# Patient Record
Sex: FEMALE | Race: WHITE | NOT HISPANIC OR LATINO
[De-identification: names, ages, dates, MRNs, and addresses within clinical notes are randomized per-mention and may not be internally consistent; named-entity substitution may affect disease eponyms.]

---

## 2017-01-09 ENCOUNTER — APPOINTMENT (OUTPATIENT)
Dept: PULMONOLOGY | Facility: CLINIC | Age: 82
End: 2017-01-09

## 2017-01-19 ENCOUNTER — APPOINTMENT (OUTPATIENT)
Dept: PULMONOLOGY | Facility: CLINIC | Age: 82
End: 2017-01-19

## 2017-01-19 VITALS
HEIGHT: 63 IN | SYSTOLIC BLOOD PRESSURE: 141 MMHG | OXYGEN SATURATION: 98 % | DIASTOLIC BLOOD PRESSURE: 77 MMHG | BODY MASS INDEX: 26.4 KG/M2 | TEMPERATURE: 98.2 F | WEIGHT: 149 LBS | RESPIRATION RATE: 12 BRPM | HEART RATE: 61 BPM

## 2017-01-23 LAB
ALBUMIN SERPL ELPH-MCNC: 4.4 G/DL
ALP BLD-CCNC: 98 U/L
ALT SERPL-CCNC: 15 U/L
ANION GAP SERPL CALC-SCNC: 17 MMOL/L
AST SERPL-CCNC: 26 U/L
BASOPHILS # BLD AUTO: 0.06 K/UL
BASOPHILS NFR BLD AUTO: 1 %
BILIRUB SERPL-MCNC: 0.5 MG/DL
BUN SERPL-MCNC: 28 MG/DL
CALCIUM SERPL-MCNC: 9.5 MG/DL
CHLORIDE SERPL-SCNC: 98 MMOL/L
CHOLEST SERPL-MCNC: 190 MG/DL
CHOLEST/HDLC SERPL: 2.3 RATIO
CO2 SERPL-SCNC: 24 MMOL/L
CREAT SERPL-MCNC: 1.23 MG/DL
EOSINOPHIL # BLD AUTO: 0.21 K/UL
EOSINOPHIL NFR BLD AUTO: 3.5 %
GLUCOSE SERPL-MCNC: 94 MG/DL
HBA1C MFR BLD HPLC: 5.8 %
HCT VFR BLD CALC: 41.2 %
HDLC SERPL-MCNC: 83 MG/DL
HGB BLD-MCNC: 13.5 G/DL
IMM GRANULOCYTES NFR BLD AUTO: 0.2 %
LDLC SERPL CALC-MCNC: 79 MG/DL
LYMPHOCYTES # BLD AUTO: 1.76 K/UL
LYMPHOCYTES NFR BLD AUTO: 29.1 %
MAN DIFF?: NORMAL
MCHC RBC-ENTMCNC: 29.5 PG
MCHC RBC-ENTMCNC: 32.8 GM/DL
MCV RBC AUTO: 90.2 FL
MONOCYTES # BLD AUTO: 0.56 K/UL
MONOCYTES NFR BLD AUTO: 9.3 %
NEUTROPHILS # BLD AUTO: 3.44 K/UL
NEUTROPHILS NFR BLD AUTO: 56.9 %
PLATELET # BLD AUTO: 261 K/UL
POTASSIUM SERPL-SCNC: 3.9 MMOL/L
PROT SERPL-MCNC: 6.7 G/DL
RBC # BLD: 4.57 M/UL
RBC # FLD: 13.6 %
SODIUM SERPL-SCNC: 139 MMOL/L
T4 SERPL-MCNC: 7.1 UG/DL
TRIGL SERPL-MCNC: 138 MG/DL
TSH SERPL-ACNC: 2.49 UIU/ML
WBC # FLD AUTO: 6.04 K/UL

## 2017-01-26 LAB
APPEARANCE: ABNORMAL
BACTERIA: NEGATIVE
BILIRUBIN URINE: ABNORMAL
BLOOD URINE: NEGATIVE
COLOR: ABNORMAL
GLUCOSE QUALITATIVE U: NORMAL MG/DL
HYALINE CASTS: 2 /LPF
KETONES URINE: ABNORMAL
LEUKOCYTE ESTERASE URINE: ABNORMAL
MICROSCOPIC-UA: NORMAL
NITRITE URINE: NEGATIVE
PH URINE: 5.5
PROTEIN URINE: NEGATIVE MG/DL
RED BLOOD CELLS URINE: 4 /HPF
SPECIFIC GRAVITY URINE: 1.02
SQUAMOUS EPITHELIAL CELLS: 4 /HPF
URINE COMMENTS: NORMAL
UROBILINOGEN URINE: NORMAL MG/DL
WHITE BLOOD CELLS URINE: 15 /HPF

## 2017-04-27 ENCOUNTER — APPOINTMENT (OUTPATIENT)
Dept: PULMONOLOGY | Facility: CLINIC | Age: 82
End: 2017-04-27

## 2017-04-27 VITALS
HEART RATE: 67 BPM | DIASTOLIC BLOOD PRESSURE: 76 MMHG | SYSTOLIC BLOOD PRESSURE: 138 MMHG | BODY MASS INDEX: 26.4 KG/M2 | HEIGHT: 63 IN | RESPIRATION RATE: 12 BRPM | WEIGHT: 149 LBS | OXYGEN SATURATION: 96 %

## 2017-04-27 DIAGNOSIS — Z00.00 ENCOUNTER FOR GENERAL ADULT MEDICAL EXAMINATION W/OUT ABNORMAL FINDINGS: ICD-10-CM

## 2017-05-01 LAB
ALBUMIN SERPL ELPH-MCNC: 4.1 G/DL
ALP BLD-CCNC: 104 U/L
ALT SERPL-CCNC: 17 U/L
ANION GAP SERPL CALC-SCNC: 15 MMOL/L
AST SERPL-CCNC: 29 U/L
BACTERIA UR CULT: NORMAL
BASOPHILS # BLD AUTO: 0.03 K/UL
BASOPHILS NFR BLD AUTO: 0.5 %
BILIRUB SERPL-MCNC: 0.7 MG/DL
BUN SERPL-MCNC: 25 MG/DL
CALCIUM SERPL-MCNC: 9.3 MG/DL
CHLORIDE SERPL-SCNC: 99 MMOL/L
CHOLEST SERPL-MCNC: 173 MG/DL
CHOLEST/HDLC SERPL: 2.7 RATIO
CO2 SERPL-SCNC: 26 MMOL/L
CREAT SERPL-MCNC: 1.11 MG/DL
EOSINOPHIL # BLD AUTO: 0.18 K/UL
EOSINOPHIL NFR BLD AUTO: 3.1 %
GLUCOSE SERPL-MCNC: 98 MG/DL
HBA1C MFR BLD HPLC: 5.7 %
HCT VFR BLD CALC: 40.3 %
HDLC SERPL-MCNC: 65 MG/DL
HGB BLD-MCNC: 13.5 G/DL
IMM GRANULOCYTES NFR BLD AUTO: 0.2 %
LDLC SERPL CALC-MCNC: 78 MG/DL
LYMPHOCYTES # BLD AUTO: 1.57 K/UL
LYMPHOCYTES NFR BLD AUTO: 27.2 %
MAN DIFF?: NORMAL
MCHC RBC-ENTMCNC: 30.7 PG
MCHC RBC-ENTMCNC: 33.5 GM/DL
MCV RBC AUTO: 91.6 FL
MONOCYTES # BLD AUTO: 0.46 K/UL
MONOCYTES NFR BLD AUTO: 8 %
NEUTROPHILS # BLD AUTO: 3.53 K/UL
NEUTROPHILS NFR BLD AUTO: 61 %
PLATELET # BLD AUTO: 250 K/UL
POTASSIUM SERPL-SCNC: 4.2 MMOL/L
PROT SERPL-MCNC: 6.6 G/DL
RBC # BLD: 4.4 M/UL
RBC # FLD: 13.4 %
SODIUM SERPL-SCNC: 140 MMOL/L
T4 SERPL-MCNC: 7.3 UG/DL
TRIGL SERPL-MCNC: 150 MG/DL
TSH SERPL-ACNC: 2.41 UIU/ML
WBC # FLD AUTO: 5.78 K/UL

## 2017-05-02 LAB
APPEARANCE: ABNORMAL
BACTERIA: ABNORMAL
BILIRUBIN URINE: NEGATIVE
BLOOD URINE: NEGATIVE
COLOR: ABNORMAL
GLUCOSE QUALITATIVE U: NORMAL MG/DL
HYALINE CASTS: 0 /LPF
KETONES URINE: NEGATIVE
LEUKOCYTE ESTERASE URINE: ABNORMAL
MICROSCOPIC-UA: NORMAL
NITRITE URINE: NEGATIVE
PH URINE: 6
PROTEIN URINE: NEGATIVE MG/DL
RED BLOOD CELLS URINE: 6 /HPF
SPECIFIC GRAVITY URINE: 1.02
SQUAMOUS EPITHELIAL CELLS: 4 /HPF
URINE COMMENTS: NORMAL
UROBILINOGEN URINE: 1 MG/DL
WHITE BLOOD CELLS URINE: 12 /HPF

## 2017-05-30 ENCOUNTER — MEDICATION RENEWAL (OUTPATIENT)
Age: 82
End: 2017-05-30

## 2017-06-01 ENCOUNTER — RX RENEWAL (OUTPATIENT)
Age: 82
End: 2017-06-01

## 2017-06-28 ENCOUNTER — MEDICATION RENEWAL (OUTPATIENT)
Age: 82
End: 2017-06-28

## 2017-08-09 ENCOUNTER — MEDICATION RENEWAL (OUTPATIENT)
Age: 82
End: 2017-08-09

## 2017-08-09 ENCOUNTER — RX RENEWAL (OUTPATIENT)
Age: 82
End: 2017-08-09

## 2017-08-25 ENCOUNTER — RX RENEWAL (OUTPATIENT)
Age: 82
End: 2017-08-25

## 2017-08-31 ENCOUNTER — APPOINTMENT (OUTPATIENT)
Dept: PULMONOLOGY | Facility: CLINIC | Age: 82
End: 2017-08-31
Payer: MEDICARE

## 2017-08-31 VITALS
BODY MASS INDEX: 25.69 KG/M2 | HEART RATE: 74 BPM | HEIGHT: 63 IN | RESPIRATION RATE: 12 BRPM | WEIGHT: 145 LBS | DIASTOLIC BLOOD PRESSURE: 73 MMHG | SYSTOLIC BLOOD PRESSURE: 120 MMHG | OXYGEN SATURATION: 98 %

## 2017-08-31 PROCEDURE — 99214 OFFICE O/P EST MOD 30 MIN: CPT

## 2017-09-01 LAB
ALBUMIN SERPL ELPH-MCNC: 4.3 G/DL
ALP BLD-CCNC: 103 U/L
ALT SERPL-CCNC: 18 U/L
ANION GAP SERPL CALC-SCNC: 17 MMOL/L
APPEARANCE: CLEAR
AST SERPL-CCNC: 27 U/L
BACTERIA: NEGATIVE
BILIRUB SERPL-MCNC: 0.6 MG/DL
BILIRUBIN URINE: ABNORMAL
BLOOD URINE: NEGATIVE
BUN SERPL-MCNC: 25 MG/DL
CALCIUM SERPL-MCNC: 9.4 MG/DL
CHLORIDE SERPL-SCNC: 98 MMOL/L
CHOLEST SERPL-MCNC: 186 MG/DL
CHOLEST/HDLC SERPL: 2.4 RATIO
CO2 SERPL-SCNC: 26 MMOL/L
COLOR: ABNORMAL
CREAT SERPL-MCNC: 1.18 MG/DL
GLUCOSE QUALITATIVE U: NORMAL MG/DL
GLUCOSE SERPL-MCNC: 101 MG/DL
HBA1C MFR BLD HPLC: 5.7 %
HDLC SERPL-MCNC: 78 MG/DL
HYALINE CASTS: 3 /LPF
KETONES URINE: NEGATIVE
LDLC SERPL CALC-MCNC: 85 MG/DL
LEUKOCYTE ESTERASE URINE: ABNORMAL
MICROSCOPIC-UA: NORMAL
NITRITE URINE: NEGATIVE
PH URINE: 5
POTASSIUM SERPL-SCNC: 3.8 MMOL/L
PROT SERPL-MCNC: 6.9 G/DL
PROTEIN URINE: NEGATIVE MG/DL
RED BLOOD CELLS URINE: 0 /HPF
SODIUM SERPL-SCNC: 141 MMOL/L
SPECIFIC GRAVITY URINE: 1.02
SQUAMOUS EPITHELIAL CELLS: 3 /HPF
TRIGL SERPL-MCNC: 116 MG/DL
UROBILINOGEN URINE: NORMAL MG/DL
WHITE BLOOD CELLS URINE: 8 /HPF

## 2017-09-07 LAB
BASOPHILS # BLD AUTO: 0.06 K/UL
BASOPHILS NFR BLD AUTO: 1 %
EOSINOPHIL # BLD AUTO: 0.17 K/UL
EOSINOPHIL NFR BLD AUTO: 2.8 %
HCT VFR BLD CALC: 41.2 %
HGB BLD-MCNC: 13.2 G/DL
IMM GRANULOCYTES NFR BLD AUTO: 0.2 %
LYMPHOCYTES # BLD AUTO: 1.52 K/UL
LYMPHOCYTES NFR BLD AUTO: 25 %
MAN DIFF?: NORMAL
MCHC RBC-ENTMCNC: 28.3 PG
MCHC RBC-ENTMCNC: 32 GM/DL
MCV RBC AUTO: 88.4 FL
MONOCYTES # BLD AUTO: 0.5 K/UL
MONOCYTES NFR BLD AUTO: 8.2 %
NEUTROPHILS # BLD AUTO: 3.82 K/UL
NEUTROPHILS NFR BLD AUTO: 62.8 %
PLATELET # BLD AUTO: 283 K/UL
RBC # BLD: 4.66 M/UL
RBC # FLD: 13.7 %
WBC # FLD AUTO: 6.08 K/UL

## 2017-10-02 ENCOUNTER — RX RENEWAL (OUTPATIENT)
Age: 82
End: 2017-10-02

## 2017-11-09 ENCOUNTER — RX RENEWAL (OUTPATIENT)
Age: 82
End: 2017-11-09

## 2017-12-07 ENCOUNTER — APPOINTMENT (OUTPATIENT)
Dept: PULMONOLOGY | Facility: CLINIC | Age: 82
End: 2017-12-07
Payer: MEDICARE

## 2017-12-07 VITALS
BODY MASS INDEX: 25.34 KG/M2 | WEIGHT: 143 LBS | RESPIRATION RATE: 13 BRPM | HEART RATE: 65 BPM | TEMPERATURE: 98.3 F | OXYGEN SATURATION: 96 % | HEIGHT: 63 IN

## 2017-12-07 DIAGNOSIS — F41.9 ANXIETY DISORDER, UNSPECIFIED: ICD-10-CM

## 2017-12-07 DIAGNOSIS — R09.82 POSTNASAL DRIP: ICD-10-CM

## 2017-12-07 PROCEDURE — 99214 OFFICE O/P EST MOD 30 MIN: CPT

## 2017-12-08 LAB
ALBUMIN SERPL ELPH-MCNC: 4.1 G/DL
ALP BLD-CCNC: 100 U/L
ALT SERPL-CCNC: 16 U/L
ANION GAP SERPL CALC-SCNC: 19 MMOL/L
AST SERPL-CCNC: 29 U/L
BILIRUB SERPL-MCNC: 0.7 MG/DL
BUN SERPL-MCNC: 30 MG/DL
CALCIUM SERPL-MCNC: 9.7 MG/DL
CHLORIDE SERPL-SCNC: 96 MMOL/L
CHOLEST SERPL-MCNC: 187 MG/DL
CHOLEST/HDLC SERPL: 2.4 RATIO
CO2 SERPL-SCNC: 23 MMOL/L
CREAT SERPL-MCNC: 1.31 MG/DL
GLUCOSE SERPL-MCNC: 90 MG/DL
HBA1C MFR BLD HPLC: 5.6 %
HDLC SERPL-MCNC: 79 MG/DL
LDLC SERPL CALC-MCNC: 87 MG/DL
POTASSIUM SERPL-SCNC: 3.9 MMOL/L
PROT SERPL-MCNC: 7.2 G/DL
SODIUM SERPL-SCNC: 138 MMOL/L
T4 SERPL-MCNC: 7.8 UG/DL
TRIGL SERPL-MCNC: 104 MG/DL
TSH SERPL-ACNC: 1.81 UIU/ML

## 2017-12-11 LAB
BASOPHILS # BLD AUTO: 0.06 K/UL
BASOPHILS NFR BLD AUTO: 0.7 %
EOSINOPHIL # BLD AUTO: 0.2 K/UL
EOSINOPHIL NFR BLD AUTO: 2.5 %
HCT VFR BLD CALC: 41.3 %
HGB BLD-MCNC: 13.9 G/DL
IMM GRANULOCYTES NFR BLD AUTO: 0.2 %
LYMPHOCYTES # BLD AUTO: 1.53 K/UL
LYMPHOCYTES NFR BLD AUTO: 19.1 %
MAN DIFF?: NORMAL
MCHC RBC-ENTMCNC: 30.4 PG
MCHC RBC-ENTMCNC: 33.7 GM/DL
MCV RBC AUTO: 90.4 FL
MONOCYTES # BLD AUTO: 0.67 K/UL
MONOCYTES NFR BLD AUTO: 8.4 %
NEUTROPHILS # BLD AUTO: 5.54 K/UL
NEUTROPHILS NFR BLD AUTO: 69.1 %
PLATELET # BLD AUTO: 258 K/UL
RBC # BLD: 4.57 M/UL
RBC # FLD: 13.8 %
WBC # FLD AUTO: 8.02 K/UL

## 2018-01-02 ENCOUNTER — RX RENEWAL (OUTPATIENT)
Age: 83
End: 2018-01-02

## 2018-01-24 ENCOUNTER — LABORATORY RESULT (OUTPATIENT)
Age: 83
End: 2018-01-24

## 2018-01-24 ENCOUNTER — APPOINTMENT (OUTPATIENT)
Dept: CARDIOLOGY | Facility: CLINIC | Age: 83
End: 2018-01-24
Payer: MEDICARE

## 2018-01-24 ENCOUNTER — NON-APPOINTMENT (OUTPATIENT)
Age: 83
End: 2018-01-24

## 2018-01-24 ENCOUNTER — APPOINTMENT (OUTPATIENT)
Dept: INTERNAL MEDICINE | Facility: CLINIC | Age: 83
End: 2018-01-24
Payer: MEDICARE

## 2018-01-24 VITALS
WEIGHT: 144 LBS | BODY MASS INDEX: 25.52 KG/M2 | RESPIRATION RATE: 12 BRPM | SYSTOLIC BLOOD PRESSURE: 127 MMHG | DIASTOLIC BLOOD PRESSURE: 83 MMHG | OXYGEN SATURATION: 97 % | HEIGHT: 63 IN | HEART RATE: 68 BPM | TEMPERATURE: 97.7 F

## 2018-01-24 PROCEDURE — 99214 OFFICE O/P EST MOD 30 MIN: CPT

## 2018-01-24 PROCEDURE — 99215 OFFICE O/P EST HI 40 MIN: CPT

## 2018-01-28 LAB — BACTERIA UR CULT: NORMAL

## 2018-01-29 ENCOUNTER — CHART COPY (OUTPATIENT)
Age: 83
End: 2018-01-29

## 2018-01-29 LAB
APPEARANCE: CLEAR
BILIRUBIN URINE: NEGATIVE
BLOOD URINE: NEGATIVE
COLOR: YELLOW
GLUCOSE QUALITATIVE U: NEGATIVE MG/DL
KETONES URINE: NEGATIVE
LEUKOCYTE ESTERASE URINE: ABNORMAL
NITRITE URINE: NEGATIVE
PH URINE: 5.5
PROTEIN URINE: NEGATIVE MG/DL
SPECIFIC GRAVITY URINE: 1.02
UROBILINOGEN URINE: NEGATIVE MG/DL

## 2018-02-15 ENCOUNTER — MEDICATION RENEWAL (OUTPATIENT)
Age: 83
End: 2018-02-15

## 2018-02-21 ENCOUNTER — EMERGENCY (EMERGENCY)
Facility: HOSPITAL | Age: 83
LOS: 1 days | Discharge: HOME | End: 2018-02-21
Attending: EMERGENCY MEDICINE | Admitting: EMERGENCY MEDICINE
Payer: MEDICARE

## 2018-02-21 VITALS
DIASTOLIC BLOOD PRESSURE: 68 MMHG | RESPIRATION RATE: 18 BRPM | HEART RATE: 74 BPM | WEIGHT: 139.99 LBS | SYSTOLIC BLOOD PRESSURE: 110 MMHG | OXYGEN SATURATION: 97 %

## 2018-02-21 LAB
ALBUMIN SERPL ELPH-MCNC: 4.2 G/DL — SIGNIFICANT CHANGE UP (ref 3.3–5)
ALP SERPL-CCNC: 91 U/L — SIGNIFICANT CHANGE UP (ref 40–120)
ALT FLD-CCNC: 14 U/L RC — SIGNIFICANT CHANGE UP (ref 10–45)
ANION GAP SERPL CALC-SCNC: 15 MMOL/L — SIGNIFICANT CHANGE UP (ref 5–17)
APPEARANCE UR: CLEAR — SIGNIFICANT CHANGE UP
APTT BLD: 30 SEC — SIGNIFICANT CHANGE UP (ref 27.5–37.4)
AST SERPL-CCNC: 25 U/L — SIGNIFICANT CHANGE UP (ref 10–40)
BASOPHILS # BLD AUTO: 0.1 K/UL — SIGNIFICANT CHANGE UP (ref 0–0.2)
BASOPHILS NFR BLD AUTO: 1 % — SIGNIFICANT CHANGE UP (ref 0–2)
BILIRUB SERPL-MCNC: 1.2 MG/DL — SIGNIFICANT CHANGE UP (ref 0.2–1.2)
BILIRUB UR-MCNC: NEGATIVE — SIGNIFICANT CHANGE UP
BUN SERPL-MCNC: 21 MG/DL — SIGNIFICANT CHANGE UP (ref 7–23)
CALCIUM SERPL-MCNC: 9.7 MG/DL — SIGNIFICANT CHANGE UP (ref 8.4–10.5)
CHLORIDE SERPL-SCNC: 99 MMOL/L — SIGNIFICANT CHANGE UP (ref 96–108)
CK MB CFR SERPL CALC: 1.7 NG/ML — SIGNIFICANT CHANGE UP (ref 0–3.8)
CK SERPL-CCNC: 69 U/L — SIGNIFICANT CHANGE UP (ref 25–170)
CO2 SERPL-SCNC: 27 MMOL/L — SIGNIFICANT CHANGE UP (ref 22–31)
COLOR SPEC: YELLOW — SIGNIFICANT CHANGE UP
CREAT SERPL-MCNC: 1.07 MG/DL — SIGNIFICANT CHANGE UP (ref 0.5–1.3)
DIFF PNL FLD: NEGATIVE — SIGNIFICANT CHANGE UP
EOSINOPHIL # BLD AUTO: 0.1 K/UL — SIGNIFICANT CHANGE UP (ref 0–0.5)
EOSINOPHIL NFR BLD AUTO: 2.3 % — SIGNIFICANT CHANGE UP (ref 0–6)
GLUCOSE SERPL-MCNC: 94 MG/DL — SIGNIFICANT CHANGE UP (ref 70–99)
GLUCOSE UR QL: NEGATIVE — SIGNIFICANT CHANGE UP
HCT VFR BLD CALC: 41.6 % — SIGNIFICANT CHANGE UP (ref 34.5–45)
HGB BLD-MCNC: 13.8 G/DL — SIGNIFICANT CHANGE UP (ref 11.5–15.5)
INR BLD: 1.03 RATIO — SIGNIFICANT CHANGE UP (ref 0.88–1.16)
KETONES UR-MCNC: NEGATIVE — SIGNIFICANT CHANGE UP
LEUKOCYTE ESTERASE UR-ACNC: ABNORMAL
LIDOCAIN IGE QN: 31 U/L — SIGNIFICANT CHANGE UP (ref 7–60)
LYMPHOCYTES # BLD AUTO: 1.2 K/UL — SIGNIFICANT CHANGE UP (ref 1–3.3)
LYMPHOCYTES # BLD AUTO: 19.4 % — SIGNIFICANT CHANGE UP (ref 13–44)
MCHC RBC-ENTMCNC: 30.6 PG — SIGNIFICANT CHANGE UP (ref 27–34)
MCHC RBC-ENTMCNC: 33.1 GM/DL — SIGNIFICANT CHANGE UP (ref 32–36)
MCV RBC AUTO: 92.6 FL — SIGNIFICANT CHANGE UP (ref 80–100)
MONOCYTES # BLD AUTO: 0.6 K/UL — SIGNIFICANT CHANGE UP (ref 0–0.9)
MONOCYTES NFR BLD AUTO: 9.1 % — SIGNIFICANT CHANGE UP (ref 2–14)
NEUTROPHILS # BLD AUTO: 4.2 K/UL — SIGNIFICANT CHANGE UP (ref 1.8–7.4)
NEUTROPHILS NFR BLD AUTO: 68.2 % — SIGNIFICANT CHANGE UP (ref 43–77)
NITRITE UR-MCNC: NEGATIVE — SIGNIFICANT CHANGE UP
PH UR: 8 — SIGNIFICANT CHANGE UP (ref 5–8)
PLATELET # BLD AUTO: 230 K/UL — SIGNIFICANT CHANGE UP (ref 150–400)
POTASSIUM SERPL-MCNC: 3.8 MMOL/L — SIGNIFICANT CHANGE UP (ref 3.5–5.3)
POTASSIUM SERPL-SCNC: 3.8 MMOL/L — SIGNIFICANT CHANGE UP (ref 3.5–5.3)
PROT SERPL-MCNC: 7 G/DL — SIGNIFICANT CHANGE UP (ref 6–8.3)
PROT UR-MCNC: NEGATIVE — SIGNIFICANT CHANGE UP
PROTHROM AB SERPL-ACNC: 11.1 SEC — SIGNIFICANT CHANGE UP (ref 9.8–12.7)
RBC # BLD: 4.49 M/UL — SIGNIFICANT CHANGE UP (ref 3.8–5.2)
RBC # FLD: 12.3 % — SIGNIFICANT CHANGE UP (ref 10.3–14.5)
SODIUM SERPL-SCNC: 141 MMOL/L — SIGNIFICANT CHANGE UP (ref 135–145)
SP GR SPEC: 1.01 — LOW (ref 1.01–1.02)
TROPONIN T SERPL-MCNC: <0.01 NG/ML — SIGNIFICANT CHANGE UP (ref 0–0.06)
TROPONIN T SERPL-MCNC: <0.01 NG/ML — SIGNIFICANT CHANGE UP (ref 0–0.06)
UROBILINOGEN FLD QL: NEGATIVE — SIGNIFICANT CHANGE UP
WBC # BLD: 6.2 K/UL — SIGNIFICANT CHANGE UP (ref 3.8–10.5)
WBC # FLD AUTO: 6.2 K/UL — SIGNIFICANT CHANGE UP (ref 3.8–10.5)

## 2018-02-21 PROCEDURE — 93971 EXTREMITY STUDY: CPT | Mod: 26

## 2018-02-21 PROCEDURE — 99220: CPT | Mod: 25,GC

## 2018-02-21 PROCEDURE — 99285 EMERGENCY DEPT VISIT HI MDM: CPT

## 2018-02-21 PROCEDURE — 93010 ELECTROCARDIOGRAM REPORT: CPT

## 2018-02-21 PROCEDURE — 71046 X-RAY EXAM CHEST 2 VIEWS: CPT | Mod: 26

## 2018-02-21 RX ORDER — ASPIRIN/CALCIUM CARB/MAGNESIUM 324 MG
162 TABLET ORAL ONCE
Qty: 0 | Refills: 0 | Status: COMPLETED | OUTPATIENT
Start: 2018-02-21 | End: 2018-02-21

## 2018-02-21 RX ORDER — ASPIRIN/CALCIUM CARB/MAGNESIUM 324 MG
81 TABLET ORAL DAILY
Qty: 0 | Refills: 0 | Status: DISCONTINUED | OUTPATIENT
Start: 2018-02-21 | End: 2018-02-25

## 2018-02-21 RX ORDER — ATORVASTATIN CALCIUM 80 MG/1
40 TABLET, FILM COATED ORAL AT BEDTIME
Qty: 0 | Refills: 0 | Status: DISCONTINUED | OUTPATIENT
Start: 2018-02-21 | End: 2018-02-25

## 2018-02-21 RX ORDER — CLOPIDOGREL BISULFATE 75 MG/1
75 TABLET, FILM COATED ORAL DAILY
Qty: 0 | Refills: 0 | Status: DISCONTINUED | OUTPATIENT
Start: 2018-02-21 | End: 2018-02-25

## 2018-02-21 RX ADMIN — Medication 162 MILLIGRAM(S): at 16:42

## 2018-02-21 NOTE — ED PROVIDER NOTE - MEDICAL DECISION MAKING DETAILS
The patient is a 88 Y.O. female, very poor historian, history CAD s/p stents. HLD, HTN who presents with non-specific symptoms and 2 week so chest discomfort.   Nonspecific symptoms, EKG no The patient is a 88 Y.O. female, very poor historian, history CAD s/p stents. HLD, HTN who presents with non-specific symptoms and 2 week so chest discomfort.   Nonspecific symptoms, EKG non diagnostic, LUE swelling  Will check labs, ua, cxr, for weakness, CE, LUE us.

## 2018-02-21 NOTE — ED PROVIDER NOTE - CARDIAC, MLM
Normal rate, regular rhythm.  Heart sounds S1, S2.  No murmurs, rubs or gallops. No chest wall tenderness.

## 2018-02-21 NOTE — ED CDU PROVIDER INITIAL DAY NOTE - MEDICAL DECISION MAKING DETAILS
ATTENDING MD Ted: Please see original ED provider note MDM and progress notes for full medical decision making leading to CDU stay.

## 2018-02-21 NOTE — ED CDU PROVIDER INITIAL DAY NOTE - OBJECTIVE STATEMENT
88yof with family at bedside, lives at home alone, uses cane, daughter lives next door, pmhx CAD s/p stents 16years ago. HLD, HTN who presents with non-specific symptoms and 2 week so chest discomfort; pt states she has not been herself last two weeks. Had a URI symptom in Dec. States she has been having chest pressure that lasts for 30 min for most of days last two weeks. She is not sure if it is related with exertion, n/v or diaphoresis, does not state she is SOB. No fevers, chills, dysuria, no additional complaints. Lives at home by her self. She has been having increased burping at home.    Cards: Samanta Esqueda  PMCAYDEN toscano

## 2018-02-21 NOTE — ED PROVIDER NOTE - ENMT NEGATIVE STATEMENT, MLM
Ears: right ear fullness chronic, .Nose: R sinus congestion. Mouth/Throat: no dysphagia, no hoarseness and no throat pain.Neck: no lumps, no pain, no stiffness and no swollen glands.

## 2018-02-21 NOTE — ED PROVIDER NOTE - MUSCULOSKELETAL NEGATIVE STATEMENT, MLM
no back pain, no gout, no musculoskeletal pain, no neck pain, and no weakness. Calf pain, states chronic for years.

## 2018-02-21 NOTE — ED CDU PROVIDER INITIAL DAY NOTE - PROGRESS NOTE DETAILS
CDU NOTE PA Raudel: pt resting comfortably, feels well without complaint. no cp/sob/dyspnea. NAD most recent VSS. no events on tele.

## 2018-02-21 NOTE — ED PROVIDER NOTE - ATTENDING CONTRIBUTION TO CARE
ATTENDING MD:  Eric WALDROP, personally have seen and examined this patient.  I have discussed all aspects of care with the resident physician. Resident note reviewed and agree on plan of care and except where noted.  See HPI, PE, and MDM for details.     VITALS: reviewed  GEN: NAD, A & O x 4  HEAD/EYES: NCAT, PERRL, EOMI, anicteric sclerae, no conjunctival pallor  ENT: mucus membranes moist, oropharynx WNL, trachea midline, unilateral left JVD  RESP: lungs CTA with equal breath sounds bilaterally, chest wall nontender and atraumatic  CV: heart with reg rhythm S1, S2, no murmur; distal pulses intact and symmetric bilaterally  ABDOMEN: normoactive bowel sounds, soft, nondistended, nontender, no palpable masses  : no CVAT  MSK: extremities atraumatic and nontender, mild assymetric LUE swelling. the back is without midline or lateral tenderness, there is no spinal deformity or stepoff and the back is ranged painlessly. the neck has no midline tenderness, deformity, or stepoff, and is ranged painlessly.  SKIN: warm, dry, no rash, no bruising, no cyanosis. color appropriate for ethnicity  NEURO: alert, mentating appropriately, no facial asymmetry. gross sensation, motor, coordination are intact  PSYCH: Affect appropriate     MDM: pt presents with atypical chest discomfort. has assymetric LUE swelling and JVD, but otherwise no signs or sypmtoms concerning for PE, will r/o DVT. will evaluate for ACS, infection. . no concern for dissection

## 2018-02-21 NOTE — ED ADULT NURSE REASSESSMENT NOTE - NS ED NURSE REASSESS COMMENT FT1
Received pt from JUNE Alvarado, received pt alert and responsive, oriented x4, denies any respiratory distress, SOB, or difficulty breathing. Pt transferred to CDU for chest pressure, pt denies Chest pain/ pressure, SOB, diaphoresis, dizziness, lightheadedness, N/V, F/C denies heart palpitations. On tele SR hr: 64. Pending stress test in AM pt aware not to consume caffeine verbalized understanding. IV in place, patent and free of signs of infiltration, V/S stable, pt afebrile, pt denies pain at this time. Pt educated on unit and unit rules, instructed patient to notify RN of any needed assistance, Pt verbalizes understanding, Call bell placed within reach. Safety maintained. Will continue to monitor.

## 2018-02-21 NOTE — ED PROVIDER NOTE - OBJECTIVE STATEMENT
The patient is a 88 Y.O. female, very poor historian, history CAD s/p stents. HLD, HTN who presents with non-specific symptoms and 2 week so chest discomfort.     The patient has The patient is a 88 Y.O. female, very poor historian, history CAD s/p stents. HLD, HTN who presents with non-specific symptoms and 2 week so chest discomfort.     The patient states that she has not been herself last two weeks. Had a URI symptom in Dec. States she has been having chest pressure that lasts for 30 min for most of days last two weeks. She is not sure if it is related with exertion, n/v or diaphoresis, does not state she is SOB. No fevers, chills, dysuria, no additional complaints. Lives at home by her self. She has been having increased burping at home.    Cards: Samanta Esqueda  PMCAYDEN toscano

## 2018-02-21 NOTE — ED ADULT NURSE NOTE - OBJECTIVE STATEMENT
88 year old female a/ox3 brought in by ambulance from private home for left arm pain x 2 weeks and chest pressure and burping since yesterday that has resolved. patient states she has not been feeling herself x 2 weeks. denies any sob/dyspnea no change in bowel/bladder except the excessive  burping. no n/v respirations even unlabored abd soft nondistended +bs nontender skin dry warm intact gooden equally

## 2018-02-21 NOTE — ED ADULT NURSE REASSESSMENT NOTE - COMFORT CARE
warm blanket provided/darkened lights/repositioned/assisted to bathroom/po fluids offered/plan of care explained

## 2018-02-21 NOTE — ED PROVIDER NOTE - MUSCULOSKELETAL, MLM
Spine appears normal, range of motion is not limited, no muscle or joint tenderness. Chronic calf tenderness.

## 2018-02-22 VITALS
HEART RATE: 61 BPM | TEMPERATURE: 98 F | RESPIRATION RATE: 16 BRPM | SYSTOLIC BLOOD PRESSURE: 131 MMHG | DIASTOLIC BLOOD PRESSURE: 83 MMHG | OXYGEN SATURATION: 95 %

## 2018-02-22 LAB
CHOLEST SERPL-MCNC: 177 MG/DL — SIGNIFICANT CHANGE UP (ref 10–199)
HBA1C BLD-MCNC: 5.5 % — SIGNIFICANT CHANGE UP (ref 4–5.6)
HDLC SERPL-MCNC: 65 MG/DL — SIGNIFICANT CHANGE UP (ref 40–125)
LIPID PNL WITH DIRECT LDL SERPL: 97 MG/DL — SIGNIFICANT CHANGE UP
TOTAL CHOLESTEROL/HDL RATIO MEASUREMENT: 2.7 RATIO — LOW (ref 3.3–7.1)
TRIGL SERPL-MCNC: 77 MG/DL — SIGNIFICANT CHANGE UP (ref 10–149)

## 2018-02-22 PROCEDURE — 81001 URINALYSIS AUTO W/SCOPE: CPT

## 2018-02-22 PROCEDURE — 85027 COMPLETE CBC AUTOMATED: CPT

## 2018-02-22 PROCEDURE — 84484 ASSAY OF TROPONIN QUANT: CPT

## 2018-02-22 PROCEDURE — 93971 EXTREMITY STUDY: CPT

## 2018-02-22 PROCEDURE — 83690 ASSAY OF LIPASE: CPT

## 2018-02-22 PROCEDURE — 82553 CREATINE MB FRACTION: CPT

## 2018-02-22 PROCEDURE — 85730 THROMBOPLASTIN TIME PARTIAL: CPT

## 2018-02-22 PROCEDURE — 99284 EMERGENCY DEPT VISIT MOD MDM: CPT | Mod: 25

## 2018-02-22 PROCEDURE — 83036 HEMOGLOBIN GLYCOSYLATED A1C: CPT

## 2018-02-22 PROCEDURE — 80053 COMPREHEN METABOLIC PANEL: CPT

## 2018-02-22 PROCEDURE — 99217: CPT

## 2018-02-22 PROCEDURE — 80061 LIPID PANEL: CPT

## 2018-02-22 PROCEDURE — 82550 ASSAY OF CK (CPK): CPT

## 2018-02-22 PROCEDURE — 85610 PROTHROMBIN TIME: CPT

## 2018-02-22 PROCEDURE — 71046 X-RAY EXAM CHEST 2 VIEWS: CPT

## 2018-02-22 PROCEDURE — G0378: CPT

## 2018-02-22 PROCEDURE — 93005 ELECTROCARDIOGRAM TRACING: CPT

## 2018-02-22 RX ORDER — SODIUM CHLORIDE 9 MG/ML
1000 INJECTION INTRAMUSCULAR; INTRAVENOUS; SUBCUTANEOUS ONCE
Qty: 0 | Refills: 0 | Status: DISCONTINUED | OUTPATIENT
Start: 2018-02-22 | End: 2018-02-22

## 2018-02-22 NOTE — ED CDU PROVIDER DISPOSITION NOTE - CLINICAL COURSE
88yof with family at bedside, lives at home alone, uses cane, daughter lives next door, pmhx CAD s/p stents 16years ago. HLD, HTN who presents with non-specific symptoms and 2 week so chest discomfort; pt states she has not been herself last two weeks. Had a URI symptom in Dec. States she has been having chest pressure that lasts for 30 min for most of days last two weeks. She is not sure if it is related with exertion, n/v or diaphoresis, does not state she is SOB. No fevers, chills, dysuria, no additional complaints. Lives at home by her self. She has been having increased burping at home. pt sent to CDU for observation including serial cardiac enzymes, tele monitoring and Pharm Nuclear stress test. pt did well overnight, no acute events on tele, serial cardiac enzymes negative. 88yof with family at bedside, lives at home alone, uses cane, daughter lives next door, pmhx CAD s/p stents 16years ago. HLD, HTN who presents with non-specific symptoms and 2 week so chest discomfort; pt states she has not been herself last two weeks. Had a URI symptom in Dec. States she has been having chest pressure that lasts for 30 min for most of days last two weeks. She is not sure if it is related with exertion, n/v or diaphoresis, does not state she is SOB. No fevers, chills, dysuria, no additional complaints. Lives at home by her self. She has been having increased burping at home. pt sent to CDU for observation including serial cardiac enzymes, tele monitoring and Pharm Nuclear stress test. pt did well overnight, no acute events on tele, serial cardiac enzymes negative. Pt remained asymptomatic but was not able to undergo the stress testing. 88yof with family at bedside, lives at home alone, uses cane, daughter lives next door, pmhx CAD s/p stents 16years ago. HLD, HTN who presents with non-specific symptoms and 2 week so chest discomfort; pt states she has not been herself last two weeks. Had a URI symptom in Dec. States she has been having chest pressure that lasts for 30 min for most of days last two weeks. She is not sure if it is related with exertion, n/v or diaphoresis, does not state she is SOB. No fevers, chills, dysuria, no additional complaints. Lives at home by her self. She has been having increased burping at home. pt sent to CDU for observation including serial cardiac enzymes, tele monitoring and Pharm Nuclear stress test. pt did well overnight, no acute events on tele, serial cardiac enzymes negative. Pt remained asymptomatic but was not able to undergo the stress testing due to having tea in AM. Pt was asymptomatic throughout the morning and had no events. Cardiology was consulted to discuss alternatives and pt was evaluated by Dr. Arnold Esqueda who cleared pt 88yof with family at bedside, lives at home alone, uses cane, daughter lives next door, pmhx CAD s/p stents 16years ago. HLD, HTN who presents with non-specific symptoms and 2 week so chest discomfort; pt states she has not been herself last two weeks. Had a URI symptom in Dec. States she has been having chest pressure that lasts for 30 min for most of days last two weeks. She is not sure if it is related with exertion, n/v or diaphoresis, does not state she is SOB. No fevers, chills, dysuria, no additional complaints. Lives at home by her self. She has been having increased burping at home. pt sent to CDU for observation including serial cardiac enzymes, tele monitoring and Pharm Nuclear stress test. pt did well overnight, no acute events on tele, serial cardiac enzymes negative. Pt remained asymptomatic but was not able to undergo the stress testing due to having tea in AM. Pt was asymptomatic throughout the morning and had no events. Cardiology was consulted to discuss alternatives and pt was evaluated by Dr. Arnold Esqueda who cleared pt for discharge with PMD for her abnormal cholesterol test (ratio) and cardiology f/u with Dr. Lyon in the next 1-2 days. She was advised on strict return precautions and proper f/u information. Case discussed with ED attending and pt discharged ins table condition.

## 2018-02-22 NOTE — ED ADULT NURSE REASSESSMENT NOTE - NS ED NURSE REASSESS COMMENT FT1
07.00 AM Received Report from JUNE ARVIZU  07.30 Pt Reassessed  PT  A&OX4 Pt resting on & off  Pt Madelaine Pain N/V/D fever chills  CP SOB  Afebrile here Has stable vital Signs Pt  Obeys commands Moves all 4 extremities Walks with steady gait Pt denies any pain now  .  IV site  no signs of  redness infiltrations noted Pt denies any pain at IV site . Pt is continued with comfort measures & safety measures  Continue to monitor    0850 Pt Awaiting for Nuc stress test

## 2018-02-22 NOTE — ED CDU PROVIDER SUBSEQUENT DAY NOTE - MEDICAL DECISION MAKING DETAILS
88yof with family at bedside, lives at home alone, uses cane, daughter lives next door, pmhx CAD s/p stents 16years ago. HLD, HTN who presents with non-specific symptoms and 2 week so chest discomfort; pt states she has not been herself last two weeks. Had a URI symptom in Dec. States she has been having chest pressure that lasts for 30 min for most of days last two weeks. She is not sure if it is related with exertion, n/v or diaphoresis, does not state she is SOB. No fevers, chills, dysuria, no additional complaints. Lives at home by her self. She has been having increased burping at home. pt sent to CDU for observation including serial cardiac enzymes, tele monitoring and Pharm Nuclear stress test. pt did well overnight, no acute events on tele, serial cardiac enzymes negative.

## 2018-02-22 NOTE — ED CDU PROVIDER SUBSEQUENT DAY NOTE - PROGRESS NOTE DETAILS
CDU NOTE KIRAN Starks: pt asleep. NAD most recent VSS. no events on tele. Patient seen at bedside in NAD.  VSS.  Patient resting comfortably without complaints. No events on tele. Pt states she feels well currently. No cp, sob, n/v, weakness, light-headedness, or complaints at this time. Plan for pharm nuc this AM. - Jordy Rosen PA-C KEEGAN Trevizo MD: Pt is an 87 y/o female with pmhx CAD s/p stents 16 years ago, HLD, HTN who presents with non-specific symptoms and 2 week so chest discomfort. Pt sent to CDU for observation including serial cardiac enzymes, tele monitoring and Pharm Nuclear stress test. Pt did well overnight, no acute events on tele, serial cardiac enzymes negative. Pt. currently at stress test, will re-evaluate upon return. Stress lab called to inform that pt had tea this morning. CDU was unaware of this and instructed pt not have any coffee, tea, chocolate but somehow got tea on morning food tray and had some. paged Dr. Pedro as pt sees Dr. Lyon of same group to determine next course of action. Awaiting return page. - Jordy Rosen PA-C Dr. Arnold Esqueda currently with pt. Jordy Rosen PA-C Cardiologist Dr. Arnold Esqueda evaluated pt at length at bedside who cleared pt for discharge. with f/u with Dr. Lyon in next 1-2 days. Pt stable at time fo discharge. Case discussed with attending who evaluated pt. - Jordy Rosen PA-C Dr. Arnold Esqueda currently with pt. No events on tele. Pt was still not endorsing cp, sob, n/v or any symptoms/complaints prior to Dr. Esqueda's evaluation. She's had uncomplicated stay in CDU so far. Will await recommendations. - Jordy Rosen PA-C

## 2018-02-22 NOTE — ED CDU PROVIDER DISPOSITION NOTE - PLAN OF CARE
1. Stay hydrated.  2. Continue Current Home Medications  3. Follow up with your Cardiologist Dr. Lyon in 1-2 days (Bring printed results to your doctor visit).  4. Return if symptoms, worsen, fever, weakness, chest pain, difficulty breathing, dizziness and all other concerns. 1. Stay hydrated.  2. Continue Current Home Medications  3. Follow up with your Cardiologist Dr. Lyon in 1-2 days to determine if outpatient testing is needed. (Bring printed results to your doctor visit).  4. Return to the ED immediately if you develop any worsening symptoms, chest pain, shortness of breath, fever, weakness, or difficulty breathing, dizziness and all other concerns. 1. Stay hydrated.  2. Continue Current Home Medications  3. Follow up with your Cardiologist Dr. Lyon in 1-2 days to determine if outpatient testing is needed. (Bring printed results to your doctor visit).  3. Additionally please follow up with your PMD in the next 1-2 days regarding your abnormal cholesterol results.  4. Return to the ED immediately if you develop any worsening symptoms, chest pain, shortness of breath, fever, weakness, or difficulty breathing, dizziness and all other concerns.

## 2018-02-22 NOTE — ED ADULT NURSE REASSESSMENT NOTE - NS ED NURSE REASSESS COMMENT FT1
1045 Am Pt was Sent back from Nuclear stress test without being the test done as pt drank tea prior to the test . KIRAN Laura  Reevaluated the patient  and discussed with the patient alternative test option after discussing with cardiologist.  12.00 noon cardiologist Raffi Davies evaluated the patient  and pt  planning to get discharged and following up in his office 1045 Am Pt was Sent back from Nuclear stress test without being the test done as pt drank tea prior to the test . KIRAN Laura  Reevaluated the patient  and told with the patient alternative test option after discussing with cardiologist.  12.00 noon cardiologist Raffi Davies evaluated the patient  and pt  planning to get discharged ,and following up in his office  1245 Pm Pt is Discharged Ml out Pt had stable vital signs Pt A&OX4 denies any cp sob N/V/D fever chills Pt and family (SON)  was given discharge summary by KIRAN Laura and they both verbalized the understanding on follow up care .  Pt was stable at the time of discharge

## 2018-02-22 NOTE — CONSULT NOTE ADULT - SUBJECTIVE AND OBJECTIVE BOX
**********              CARDIOLOGY CONSULT NOTE              ************  ============================================================  CHIEF COMPLAINT/REASON FOR CONSULT:  Patient is a 88y old  Female who presents with a chief complaint of     HISTORY OF PRESENT ILLNESS:  88yFemale with a history of  presenting with - consulted for     ============================================================    ============================================================    To Delete  PAST MEDICAL & SURGICAL HISTORY:  Coronary stent occlusion, initial encounter  Hyperlipidemia  Hypertension  No significant past surgical history      Allergies    penicillin (Unknown)    Intolerances    	    MEDICATIONS:  aspirin enteric coated 81 milliGRAM(s) Oral daily  clopidogrel Tablet 75 milliGRAM(s) Oral daily            atorvastatin 40 milliGRAM(s) Oral at bedtime        PAST MEDICAL & SURGICAL HISTORY:  Coronary stent occlusion, initial encounter  Hyperlipidemia  Hypertension  No significant past surgical history      FAMILY HISTORY:    NC - Mother/Father    SOCIAL HISTORY:    [ x] Non-smoker  [x] No Illicit Drug Use  [ x] No Excess EtOH Use      REVIEW OF SYSTEMS: (Unless + Before Symptom, it is negative)  Constitutional: No Fever, Fatigue, Weight Changes  Eyes: No Recent Vision Changes, Eye Pain  ENT: No Congestion, Sore Throat  Endocrine: No Excess Sweating, Temperature Intolerance  Cardiovascular: No Chest Pain, Palpitations, Shortness of Breath, Pre-syncope, Syncope, LE Edema  Respiratory: No Cough, Congestion, Wheezing  Gastrointestinal: No Abdominal Pain, Nausea, Vomiting  Genitourinary: No dysuria, hematuria  Musculoskeletal: No Joint Pain, Swelling  Neurologic: No headaches, Imbalance, Weakness  Skin: No rashes, hematoma, purprura    ================================    PHYSICAL EXAM:  T(C): 36.6 (02-22-18 @ 11:52), Max: 36.8 (02-21-18 @ 21:34)  HR: 61 (02-22-18 @ 11:52) (60 - 74)  BP: 131/83 (02-22-18 @ 11:52) (102/60 - 131/83)  RR: 16 (02-22-18 @ 11:52) (16 - 18)  SpO2: 95% (02-22-18 @ 11:52) (95% - 97%)  Wt(kg): --  I&O's Summary    Appearance: Normal; NAD	  HEENT:   Normal oral mucosa, EOMI	  Lymphatic: No lymphadenopathy  Cardiovascular: Normal S1 S2, No JVD, No murmurs, No edema  Respiratory: Lungs clear to auscultation, no use of accessory muscles	  Psychiatry: A & O x 3, Mood & affect appropriate  Gastrointestinal:  Soft, Non-tender	  Skin: No rashes, No ecchymoses, No cyanosis	  Neurologic: Non-focal, No Focal Deficits  Extremities: Normal range of motion, No clubbing, cyanosis or edema  Vascular: Peripheral pulses palpable 2+ bilaterally, no prominent varicosities    ============================    LABS:	   Labs [id number] @ 12:15	    CBC Full  -  ( 21 Feb 2018 14:40 )  WBC Count : 6.2 K/uL  Hemoglobin : 13.8 g/dL  Hematocrit : 41.6 %  Platelet Count - Automated : 230 K/uL  Mean Cell Volume : 92.6 fl  Mean Cell Hemoglobin : 30.6 pg  Mean Cell Hemoglobin Concentration : 33.1 gm/dL  Auto Neutrophil # : 4.2 K/uL  Auto Lymphocyte # : 1.2 K/uL  Auto Monocyte # : 0.6 K/uL  Auto Eosinophil # : 0.1 K/uL  Auto Basophil # : 0.1 K/uL  Auto Neutrophil % : 68.2 %  Auto Lymphocyte % : 19.4 %  Auto Monocyte % : 9.1 %  Auto Eosinophil % : 2.3 %  Auto Basophil % : 1.0 %    02-21    141  |  99  |  21  ----------------------------<  94  3.8   |  27  |  1.07    Ca    9.7      21 Feb 2018 14:40    TPro  7.0  /  Alb  4.2  /  TBili  1.2  /  DBili  x   /  AST  25  /  ALT  14  /  AlkPhos  91  02-21        =========================================================================  CXR:    ECG:  	    PREVIOUS DIAGNOSTIC TESTING:    [X] Echocardiogram:    [X]  Catheterization:    [X] Stress Test:  	    =========================================================================  ASSESSMENT:    MEDICATIONS  (STANDING):  aspirin enteric coated 81 milliGRAM(s) Oral daily  atorvastatin 40 milliGRAM(s) Oral at bedtime  clopidogrel Tablet 75 milliGRAM(s) Oral daily    MEDICATIONS  (PRN):      Recommendations  - Consult note to be finished, but OK to go  -   -   -   -   - Thank you for this consult.  - Will Follow      Please call with questions.     Raffi Davies MD, [facility]  [phone] **********              CARDIOLOGY CONSULT NOTE              ************  ============================================================  CHIEF COMPLAINT/REASON FOR CONSULT:  Patient is a 88y old  Female who presents with a chief complaint of     HISTORY OF PRESENT ILLNESS:  88yFemale with a history of HTN, HL, CAD s/p PCI 16 years ago (Patient of Dr. Lyon)  presenting with chest pressure, weakness, depression - cardiology consulted for chest pressure.  Patient states that she had a viral illness a few weeks ago; since then she has felt limited in mobility. She describes being lonely and depressed.  She also describes occasional CP with belching. Lasts days. Does not take any medications for relief.  Across her chest, non-exertional ~ 3-6/10, no radiation, no sob, no palpitations, no diaphoresis, no N/V accompanying it.       Allergies    penicillin (Unknown)    Intolerances    	    MEDICATIONS:  aspirin enteric coated 81 milliGRAM(s) Oral daily  clopidogrel Tablet 75 milliGRAM(s) Oral daily            atorvastatin 40 milliGRAM(s) Oral at bedtime        PAST MEDICAL & SURGICAL HISTORY:  Coronary stent occlusion, initial encounter  Hyperlipidemia  Hypertension  No significant past surgical history      FAMILY HISTORY:    [place] - Mother/Father    SOCIAL HISTORY:    [ x] Non-smoker  [x] No Illicit Drug Use  [ x] No Excess EtOH Use      REVIEW OF SYSTEMS: (Unless + Before Symptom, it is negative)  Constitutional: No Fever, +Fatigue, Weight Changes +Depression  Eyes: No Recent Vision Changes, Eye Pain  ENT: No Congestion, Sore Throat  Endocrine: No Excess Sweating, Temperature Intolerance  Cardiovascular: +Chest Pain, Palpitations, Shortness of Breath, Pre-syncope, Syncope, LE Edema  Respiratory: No Cough, Congestion, Wheezing  Gastrointestinal: No Abdominal Pain, Nausea, Vomiting  Genitourinary: No dysuria, hematuria  Musculoskeletal: No Joint Pain, Swelling  Neurologic: No headaches, Imbalance, Weakness  Skin: No rashes, hematoma, purprura    ================================    PHYSICAL EXAM:  T(C): 36.6 (02-22-18 @ 11:52), Max: 36.8 (02-21-18 @ 21:34)  HR: 61 (02-22-18 @ 11:52) (60 - 74)  BP: 131/83 (02-22-18 @ 11:52) (102/60 - 131/83)  RR: 16 (02-22-18 @ 11:52) (16 - 18)  SpO2: 95% (02-22-18 @ 11:52) (95% - 97%)  Wt(kg): --  I&O's Summary    Appearance: Normal; NAD	+Tearful at times  HEENT:   Normal oral mucosa, EOMI	  Lymphatic: No lymphadenopathy  Cardiovascular: Normal S1 S2, No JVD, No murmurs, No edema  Respiratory: Lungs clear to auscultation, no use of accessory muscles	  Psychiatry: A & O x 3, Mood & affect appropriate  Gastrointestinal:  Soft, Non-tender	  Skin: No rashes, No ecchymoses, No cyanosis	  Neurologic: Non-focal, No Focal Deficits  Extremities: Normal range of motion, No clubbing, cyanosis or edema  Vascular: Peripheral pulses palpable 2+ bilaterally, no prominent varicosities    ============================    LABS:	   Labs [id number] @ 12:15	    CBC Full  -  ( 21 Feb 2018 14:40 )  WBC Count : 6.2 K/uL  Hemoglobin : 13.8 g/dL  Hematocrit : 41.6 %  Platelet Count - Automated : 230 K/uL  Mean Cell Volume : 92.6 fl  Mean Cell Hemoglobin : 30.6 pg  Mean Cell Hemoglobin Concentration : 33.1 gm/dL  Auto Neutrophil # : 4.2 K/uL  Auto Lymphocyte # : 1.2 K/uL  Auto Monocyte # : 0.6 K/uL  Auto Eosinophil # : 0.1 K/uL  Auto Basophil # : 0.1 K/uL  Auto Neutrophil % : 68.2 %  Auto Lymphocyte % : 19.4 %  Auto Monocyte % : 9.1 %  Auto Eosinophil % : 2.3 %  Auto Basophil % : 1.0 %    02-21    141  |  99  |  21  ----------------------------<  94  3.8   |  27  |  1.07    Ca    9.7      21 Feb 2018 14:40    TPro  7.0  /  Alb  4.2  /  TBili  1.2  /  DBili  x   /  AST  25  /  ALT  14  /  AlkPhos  91  02-21        ======================================================  ECG:  	  No ischemic changes      =========================================================================  ASSESSMENT:  Atypical CP in s/o deconditioning and depressive symtoms        Recommendations  - Re-assurance; counseled on depression   - Continue current meds  - IF symptoms recur, advised to return to ED   - F/U Dr. Camron VALLEJO to D/C     Please call with questions.     Raffi Davies MD, FACC  [phone]

## 2018-02-22 NOTE — ED CDU PROVIDER SUBSEQUENT DAY NOTE - HISTORY
CDU NOTE KIRAN Starks: pt resting comfortably, feels well without complaint.  Pt had come in to ED for 2 wks of chest discomfort with belching- unusual for her. However, while in CDU has not had recurrence of symptoms and has felt fine, asymptomatic. pt also denies sob/dyspnea, dizziness, N, weakness. NAD most recent VSS. no events on tele.

## 2018-04-11 ENCOUNTER — RX RENEWAL (OUTPATIENT)
Age: 83
End: 2018-04-11

## 2018-05-09 ENCOUNTER — LABORATORY RESULT (OUTPATIENT)
Age: 83
End: 2018-05-09

## 2018-05-09 ENCOUNTER — APPOINTMENT (OUTPATIENT)
Dept: CARDIOLOGY | Facility: CLINIC | Age: 83
End: 2018-05-09
Payer: MEDICARE

## 2018-05-09 ENCOUNTER — NON-APPOINTMENT (OUTPATIENT)
Age: 83
End: 2018-05-09

## 2018-05-09 VITALS — SYSTOLIC BLOOD PRESSURE: 128 MMHG | DIASTOLIC BLOOD PRESSURE: 80 MMHG

## 2018-05-09 VITALS
DIASTOLIC BLOOD PRESSURE: 80 MMHG | WEIGHT: 145 LBS | HEART RATE: 62 BPM | RESPIRATION RATE: 12 BRPM | BODY MASS INDEX: 25.69 KG/M2 | TEMPERATURE: 97.7 F | HEIGHT: 63 IN | SYSTOLIC BLOOD PRESSURE: 144 MMHG | OXYGEN SATURATION: 98 %

## 2018-05-09 PROCEDURE — 99214 OFFICE O/P EST MOD 30 MIN: CPT

## 2018-05-10 LAB
25(OH)D3 SERPL-MCNC: 48.6 NG/ML
ALBUMIN SERPL ELPH-MCNC: 4.1 G/DL
ALP BLD-CCNC: 95 U/L
ALT SERPL-CCNC: 11 U/L
ANION GAP SERPL CALC-SCNC: 16 MMOL/L
APPEARANCE: CLEAR
AST SERPL-CCNC: 23 U/L
BASOPHILS # BLD AUTO: 0.04 K/UL
BASOPHILS NFR BLD AUTO: 0.8 %
BILIRUB SERPL-MCNC: 0.4 MG/DL
BILIRUBIN URINE: NEGATIVE
BLOOD URINE: NEGATIVE
BUN SERPL-MCNC: 27 MG/DL
CALCIUM SERPL-MCNC: 9 MG/DL
CHLORIDE SERPL-SCNC: 100 MMOL/L
CHOLEST SERPL-MCNC: 189 MG/DL
CHOLEST/HDLC SERPL: 2.4 RATIO
CO2 SERPL-SCNC: 25 MMOL/L
COLOR: ABNORMAL
CREAT SERPL-MCNC: 0.95 MG/DL
EOSINOPHIL # BLD AUTO: 0.21 K/UL
EOSINOPHIL NFR BLD AUTO: 4.1 %
GLUCOSE QUALITATIVE U: NEGATIVE MG/DL
GLUCOSE SERPL-MCNC: 91 MG/DL
HBA1C MFR BLD HPLC: 5.6 %
HCT VFR BLD CALC: 40.1 %
HDLC SERPL-MCNC: 78 MG/DL
HGB BLD-MCNC: 12.8 G/DL
IMM GRANULOCYTES NFR BLD AUTO: 0 %
KETONES URINE: NEGATIVE
LDLC SERPL CALC-MCNC: 90 MG/DL
LEUKOCYTE ESTERASE URINE: ABNORMAL
LYMPHOCYTES # BLD AUTO: 1.38 K/UL
LYMPHOCYTES NFR BLD AUTO: 27.1 %
MAN DIFF?: NORMAL
MCHC RBC-ENTMCNC: 29.7 PG
MCHC RBC-ENTMCNC: 31.9 GM/DL
MCV RBC AUTO: 93 FL
MONOCYTES # BLD AUTO: 0.42 K/UL
MONOCYTES NFR BLD AUTO: 8.3 %
NEUTROPHILS # BLD AUTO: 3.04 K/UL
NEUTROPHILS NFR BLD AUTO: 59.7 %
NITRITE URINE: NEGATIVE
PH URINE: 5.5
PLATELET # BLD AUTO: 236 K/UL
POTASSIUM SERPL-SCNC: 3.8 MMOL/L
PROT SERPL-MCNC: 6.9 G/DL
PROTEIN URINE: NEGATIVE MG/DL
RBC # BLD: 4.31 M/UL
RBC # FLD: 13.9 %
SODIUM SERPL-SCNC: 141 MMOL/L
SPECIFIC GRAVITY URINE: 1.02
TRIGL SERPL-MCNC: 103 MG/DL
UROBILINOGEN URINE: NEGATIVE MG/DL
VIT B12 SERPL-MCNC: 944 PG/ML
WBC # FLD AUTO: 5.09 K/UL

## 2018-05-16 LAB — BACTERIA UR CULT: NORMAL

## 2018-05-25 ENCOUNTER — MEDICATION RENEWAL (OUTPATIENT)
Age: 83
End: 2018-05-25

## 2018-06-25 ENCOUNTER — APPOINTMENT (OUTPATIENT)
Dept: PULMONOLOGY | Facility: CLINIC | Age: 83
End: 2018-06-25

## 2018-06-29 ENCOUNTER — LABORATORY RESULT (OUTPATIENT)
Age: 83
End: 2018-06-29

## 2018-06-29 ENCOUNTER — APPOINTMENT (OUTPATIENT)
Dept: INTERNAL MEDICINE | Facility: CLINIC | Age: 83
End: 2018-06-29
Payer: MEDICARE

## 2018-06-29 ENCOUNTER — NON-APPOINTMENT (OUTPATIENT)
Age: 83
End: 2018-06-29

## 2018-06-29 VITALS
TEMPERATURE: 98.1 F | HEART RATE: 60 BPM | DIASTOLIC BLOOD PRESSURE: 79 MMHG | SYSTOLIC BLOOD PRESSURE: 109 MMHG | WEIGHT: 146 LBS | RESPIRATION RATE: 12 BRPM | BODY MASS INDEX: 25.87 KG/M2 | OXYGEN SATURATION: 98 % | HEIGHT: 63 IN

## 2018-06-29 DIAGNOSIS — H81.01 MENIERE'S DISEASE, RIGHT EAR: ICD-10-CM

## 2018-06-29 PROCEDURE — 99215 OFFICE O/P EST HI 40 MIN: CPT

## 2018-06-29 PROCEDURE — 93000 ELECTROCARDIOGRAM COMPLETE: CPT

## 2018-06-29 RX ORDER — DOXYCYCLINE HYCLATE 100 MG/1
100 CAPSULE ORAL
Qty: 20 | Refills: 0 | Status: COMPLETED | COMMUNITY
Start: 2018-04-11

## 2018-06-30 LAB
25(OH)D3 SERPL-MCNC: 51.8 NG/ML
ALBUMIN SERPL ELPH-MCNC: 4.2 G/DL
ALP BLD-CCNC: 102 U/L
ALT SERPL-CCNC: 16 U/L
ANION GAP SERPL CALC-SCNC: 16 MMOL/L
AST SERPL-CCNC: 28 U/L
BASOPHILS # BLD AUTO: 0.05 K/UL
BASOPHILS NFR BLD AUTO: 1 %
BILIRUB SERPL-MCNC: 0.5 MG/DL
BUN SERPL-MCNC: 25 MG/DL
CALCIUM SERPL-MCNC: 9.6 MG/DL
CHLORIDE SERPL-SCNC: 98 MMOL/L
CHOLEST SERPL-MCNC: 180 MG/DL
CHOLEST/HDLC SERPL: 2.1 RATIO
CO2 SERPL-SCNC: 25 MMOL/L
CREAT SERPL-MCNC: 1.16 MG/DL
CREAT SPEC-SCNC: 53 MG/DL
EOSINOPHIL # BLD AUTO: 0.17 K/UL
EOSINOPHIL NFR BLD AUTO: 3.5 %
ESTIMATED AVERAGE GLUCOSE: 111 MG/DL
FOLATE SERPL-MCNC: >20 NG/ML
GLUCOSE SERPL-MCNC: 96 MG/DL
HBA1C MFR BLD HPLC: 5.5 %
HCT VFR BLD CALC: 39.7 %
HDLC SERPL-MCNC: 85 MG/DL
HGB BLD-MCNC: 12.9 G/DL
IMM GRANULOCYTES NFR BLD AUTO: 0.2 %
IRON SERPL-MCNC: 45 UG/DL
LDLC SERPL CALC-MCNC: 81 MG/DL
LYMPHOCYTES # BLD AUTO: 1.4 K/UL
LYMPHOCYTES NFR BLD AUTO: 28.9 %
MAN DIFF?: NORMAL
MCHC RBC-ENTMCNC: 29.7 PG
MCHC RBC-ENTMCNC: 32.5 GM/DL
MCV RBC AUTO: 91.3 FL
MICROALBUMIN 24H UR DL<=1MG/L-MCNC: 0.5 MG/DL
MICROALBUMIN/CREAT 24H UR-RTO: 9 MG/G
MONOCYTES # BLD AUTO: 0.47 K/UL
MONOCYTES NFR BLD AUTO: 9.7 %
NEUTROPHILS # BLD AUTO: 2.74 K/UL
NEUTROPHILS NFR BLD AUTO: 56.7 %
PLATELET # BLD AUTO: 257 K/UL
POTASSIUM SERPL-SCNC: 4 MMOL/L
PROT SERPL-MCNC: 7 G/DL
RBC # BLD: 4.35 M/UL
RBC # FLD: 14.1 %
SODIUM SERPL-SCNC: 139 MMOL/L
TRIGL SERPL-MCNC: 71 MG/DL
TSH SERPL-ACNC: 2.19 UIU/ML
VIT B12 SERPL-MCNC: 966 PG/ML
WBC # FLD AUTO: 4.84 K/UL

## 2018-06-30 NOTE — DATA REVIEWED
[FreeTextEntry1] : EKG done in office = sinus gabriel = 50.  No Q-waves, no ST changes, no inverted T-waves, no ectopy. \par Same as previous EKGs but HR noted to be lowering over time.

## 2018-06-30 NOTE — HISTORY OF PRESENT ILLNESS
[FreeTextEntry8] : 87-year-old female with HTN, JENNI, CAD (2 stents, 1 balloon angioplasty), OA (B/L knees, left shoulder pain, B/L fingers), post-herpetic neuralgia, here for acute:\par \par 1 month or so, per pt, had a fall at home, due to dizziness.  Hx of meniere's.  No LOC, palpitations, CP, SOB, edema.  Now feels she is not clear-headed b/c of dizziness.  Makes her worry to go out.  Does not want to lose her independence.  Admits she is depressed due to her ongoing pains.  \par

## 2018-06-30 NOTE — REVIEW OF SYSTEMS
[Negative] : Heme/Lymph [Chest Pain] : no chest pain [Palpitations] : no palpitations [Leg Claudication] : no leg claudication [Lower Ext Edema] : no lower extremity edema [Orthopnea] : no orthopnea [Paroysmal Nocturnal Dyspnea] : no paroysmal nocturnal dyspnea [FreeTextEntry5] : palpitations when anxious

## 2018-06-30 NOTE — PHYSICAL EXAM
[No Acute Distress] : no acute distress [Well Nourished] : well nourished [Well Developed] : well developed [Well-Appearing] : well-appearing [Normal Voice/Communication] : normal voice/communication [Normal Sclera/Conjunctiva] : normal sclera/conjunctiva [PERRL] : pupils equal round and reactive to light [EOMI] : extraocular movements intact [Normal Outer Ear/Nose] : the outer ears and nose were normal in appearance [Normal Oropharynx] : the oropharynx was normal [Normal TMs] : both tympanic membranes were normal [Normal Nasal Mucosa] : the nasal mucosa was normal [No JVD] : no jugular venous distention [Supple] : supple [No Lymphadenopathy] : no lymphadenopathy [Thyroid Normal, No Nodules] : the thyroid was normal and there were no nodules present [No Respiratory Distress] : no respiratory distress  [Clear to Auscultation] : lungs were clear to auscultation bilaterally [No Accessory Muscle Use] : no accessory muscle use [Normal Rate] : normal rate  [Regular Rhythm] : with a regular rhythm [Normal S1, S2] : normal S1 and S2 [No Murmur] : no murmur heard [No Carotid Bruits] : no carotid bruits [No Edema] : there was no peripheral edema [Soft] : abdomen soft [Non Tender] : non-tender [Non-distended] : non-distended [No Masses] : no abdominal mass palpated [No HSM] : no HSM [Normal Bowel Sounds] : normal bowel sounds [Normal Supraclavicular Nodes] : no supraclavicular lymphadenopathy [Normal Posterior Cervical Nodes] : no posterior cervical lymphadenopathy [Normal Anterior Cervical Nodes] : no anterior cervical lymphadenopathy [No CVA Tenderness] : no CVA  tenderness [No Spinal Tenderness] : no spinal tenderness [Grossly Normal Strength/Tone] : grossly normal strength/tone [Normal Gait] : normal gait [Coordination Grossly Intact] : coordination grossly intact [No Focal Deficits] : no focal deficits [Deep Tendon Reflexes (DTR)] : deep tendon reflexes were 2+ and symmetric [Speech Grossly Normal] : speech grossly normal [Memory Grossly Normal] : memory grossly normal [Normal Affect] : the affect was normal [Alert and Oriented x3] : oriented to person, place, and time [Normal Mood] : the mood was normal [Normal Insight/Judgement] : insight and judgment were intact [de-identified] : elderly female [de-identified] : Heberden's nodes B/L fingers.  Knees mildly swollen

## 2018-07-01 LAB
APPEARANCE: CLEAR
BILIRUBIN URINE: NEGATIVE
BLOOD URINE: NEGATIVE
COLOR: YELLOW
GLUCOSE QUALITATIVE U: NEGATIVE MG/DL
KETONES URINE: NEGATIVE
LEUKOCYTE ESTERASE URINE: ABNORMAL
NITRITE URINE: NEGATIVE
PH URINE: 6
PROTEIN URINE: NEGATIVE MG/DL
SPECIFIC GRAVITY URINE: 1.01
UROBILINOGEN URINE: NEGATIVE MG/DL

## 2018-07-02 ENCOUNTER — MEDICATION RENEWAL (OUTPATIENT)
Age: 83
End: 2018-07-02

## 2018-07-17 ENCOUNTER — RX RENEWAL (OUTPATIENT)
Age: 83
End: 2018-07-17

## 2018-07-19 ENCOUNTER — APPOINTMENT (OUTPATIENT)
Dept: INTERNAL MEDICINE | Facility: CLINIC | Age: 83
End: 2018-07-19
Payer: MEDICARE

## 2018-07-19 VITALS
SYSTOLIC BLOOD PRESSURE: 145 MMHG | DIASTOLIC BLOOD PRESSURE: 77 MMHG | RESPIRATION RATE: 12 BRPM | BODY MASS INDEX: 25.69 KG/M2 | HEIGHT: 63 IN | WEIGHT: 145 LBS | TEMPERATURE: 97.9 F | OXYGEN SATURATION: 97 % | HEART RATE: 62 BPM

## 2018-07-19 PROCEDURE — 99214 OFFICE O/P EST MOD 30 MIN: CPT

## 2018-07-19 RX ORDER — DICLOFENAC SODIUM 10 MG/G
1 GEL TOPICAL DAILY
Qty: 1 | Refills: 3 | Status: COMPLETED | COMMUNITY
Start: 2018-01-24 | End: 2018-07-19

## 2018-07-19 RX ORDER — DICLOFENAC SODIUM 3 G/100G
3 GEL TOPICAL TWICE DAILY
Qty: 60 | Refills: 0 | Status: COMPLETED | COMMUNITY
Start: 2017-12-07 | End: 2018-07-19

## 2018-07-21 LAB — BACTERIA UR CULT: NORMAL

## 2018-07-24 LAB
APPEARANCE: CLEAR
BACTERIA: NEGATIVE
BILIRUBIN URINE: NEGATIVE
BLOOD URINE: NEGATIVE
COLOR: YELLOW
GLUCOSE QUALITATIVE U: NEGATIVE MG/DL
KETONES URINE: NEGATIVE
LEUKOCYTE ESTERASE URINE: NEGATIVE
MICROSCOPIC-UA: NORMAL
NITRITE URINE: NEGATIVE
PH URINE: 5
PROTEIN URINE: NEGATIVE MG/DL
RED BLOOD CELLS URINE: 1 /HPF
SPECIFIC GRAVITY URINE: 1.01
SQUAMOUS EPITHELIAL CELLS: 0 /HPF
UROBILINOGEN URINE: NEGATIVE MG/DL
WHITE BLOOD CELLS URINE: 1 /HPF

## 2018-07-25 ENCOUNTER — RX RENEWAL (OUTPATIENT)
Age: 83
End: 2018-07-25

## 2018-07-28 NOTE — ASSESSMENT
[FreeTextEntry1] : 88 yr old female w/hx of HTN, CAD, OA, meniere's HLD, presenting today with the following: \par \par \par Dizziness =  multifactorial (HTN, Meniere's, atherosclerosis, etc).  Meclizine not as helpful despite steady dosing.  Not much improvement w/decreased metoprolol dose.  Advising pt to see ENT\par \par UTI = recently completed abx.  R/c urine studies\par \par HTN, gabriel = pt on toprol 25mg now.  BP elevated and heart rate slightly elevated.  R/c BP in 2-4 weeks to assess toprol dosing.\par \par HLD = Stable, no change in tx.  r/c in 3 mos.\par \par Renal insufficiency = advised pt to increase PO hydration.\par \par Post-herpetic neuralgia = starting cymbalta 20mg once QHS.  Advised on use and sfx.  \par \par Arthritis & spinal stenosis = ordering diclofenac topical for application.  RTO to reassess progress.\par \par Counseled patient for greater than 50% of this visit, including diagnoses information, medication usage and side effects, therapeutic goals and options, and followup. Patient's questions were answered. Patient expressed understanding of, and agreement with, assessment and plan.

## 2018-07-28 NOTE — HISTORY OF PRESENT ILLNESS
[FreeTextEntry1] : F/U [de-identified] : 87-year-old female with HTN, JENNI, CAD (2 stents, 1 balloon angioplasty), OA (B/L knees, left shoulder pain, B/L fingers), post-herpetic neuralgia, here for F/U.  Last visit was acute visit s/p fall due to dizziness.  Still w/dizziness despite meclizine, but it is mildly improved; not as severe as it initially presented.  No additional falls.  Is now c/o recurring pain from her post-herpetic neuralgia.  Also, having increasing shoulder pain and low back pain.  Denies any saddle anesthesia, incontinence of bowel or bladder, leg weakness or numbness.  Pt also here to review labs drawn at last visit.

## 2018-07-28 NOTE — PHYSICAL EXAM
[No Acute Distress] : no acute distress [Well Nourished] : well nourished [Well Developed] : well developed [Well-Appearing] : well-appearing [Normal Voice/Communication] : normal voice/communication [Normal Sclera/Conjunctiva] : normal sclera/conjunctiva [EOMI] : extraocular movements intact [No Respiratory Distress] : no respiratory distress  [Clear to Auscultation] : lungs were clear to auscultation bilaterally [No Accessory Muscle Use] : no accessory muscle use [Normal Rate] : normal rate  [Regular Rhythm] : with a regular rhythm [Normal S1, S2] : normal S1 and S2 [No Murmur] : no murmur heard [No Carotid Bruits] : no carotid bruits [Soft] : abdomen soft [Non-distended] : non-distended [Normal Bowel Sounds] : normal bowel sounds [Grossly Normal Strength/Tone] : grossly normal strength/tone [Normal Gait] : normal gait [Coordination Grossly Intact] : coordination grossly intact [No Focal Deficits] : no focal deficits [Deep Tendon Reflexes (DTR)] : deep tendon reflexes were 2+ and symmetric [Speech Grossly Normal] : speech grossly normal [Memory Grossly Normal] : memory grossly normal [Normal Affect] : the affect was normal [Alert and Oriented x3] : oriented to person, place, and time [Normal Mood] : the mood was normal [Normal Insight/Judgement] : insight and judgment were intact [Normal Outer Ear/Nose] : the outer ears and nose were normal in appearance [No Edema] : there was no peripheral edema [No Joint Swelling] : no joint swelling [de-identified] : elderly female [de-identified] : Lumbar paraspinal tension and tenderness to palp.  [de-identified] : shoulder w/LROM due to pain upon elevation

## 2018-08-20 ENCOUNTER — MEDICATION RENEWAL (OUTPATIENT)
Age: 83
End: 2018-08-20

## 2018-08-22 ENCOUNTER — RX RENEWAL (OUTPATIENT)
Age: 83
End: 2018-08-22

## 2018-08-24 ENCOUNTER — RX RENEWAL (OUTPATIENT)
Age: 83
End: 2018-08-24

## 2018-09-22 ENCOUNTER — RX RENEWAL (OUTPATIENT)
Age: 83
End: 2018-09-22

## 2018-09-23 ENCOUNTER — RX RENEWAL (OUTPATIENT)
Age: 83
End: 2018-09-23

## 2018-10-16 ENCOUNTER — RX RENEWAL (OUTPATIENT)
Age: 83
End: 2018-10-16

## 2018-11-08 ENCOUNTER — LABORATORY RESULT (OUTPATIENT)
Age: 83
End: 2018-11-08

## 2018-11-08 ENCOUNTER — APPOINTMENT (OUTPATIENT)
Dept: INTERNAL MEDICINE | Facility: CLINIC | Age: 83
End: 2018-11-08
Payer: MEDICARE

## 2018-11-08 VITALS
WEIGHT: 146 LBS | DIASTOLIC BLOOD PRESSURE: 78 MMHG | HEART RATE: 63 BPM | BODY MASS INDEX: 25.87 KG/M2 | SYSTOLIC BLOOD PRESSURE: 118 MMHG | RESPIRATION RATE: 14 BRPM | HEIGHT: 63 IN | OXYGEN SATURATION: 96 % | TEMPERATURE: 98 F

## 2018-11-08 DIAGNOSIS — Z71.89 OTHER SPECIFIED COUNSELING: ICD-10-CM

## 2018-11-08 PROCEDURE — 99205 OFFICE O/P NEW HI 60 MIN: CPT

## 2018-11-08 NOTE — PHYSICAL EXAM
[No Acute Distress] : no acute distress [Well Nourished] : well nourished [Well Developed] : well developed [Well-Appearing] : well-appearing [Normal Sclera/Conjunctiva] : normal sclera/conjunctiva [PERRL] : pupils equal round and reactive to light [EOMI] : extraocular movements intact [Normal Outer Ear/Nose] : the outer ears and nose were normal in appearance [Normal Oropharynx] : the oropharynx was normal [No JVD] : no jugular venous distention [Supple] : supple [No Lymphadenopathy] : no lymphadenopathy [Thyroid Normal, No Nodules] : the thyroid was normal and there were no nodules present [No Respiratory Distress] : no respiratory distress  [Clear to Auscultation] : lungs were clear to auscultation bilaterally [No Accessory Muscle Use] : no accessory muscle use [Normal Rate] : normal rate  [Regular Rhythm] : with a regular rhythm [Normal S1, S2] : normal S1 and S2 [No Murmur] : no murmur heard [No Carotid Bruits] : no carotid bruits [No Abdominal Bruit] : a ~M bruit was not heard ~T in the abdomen [No Varicosities] : no varicosities [Pedal Pulses Present] : the pedal pulses are present [No Edema] : there was no peripheral edema [No Extremity Clubbing/Cyanosis] : no extremity clubbing/cyanosis [No Palpable Aorta] : no palpable aorta [Normal Appearance] : normal in appearance [No Nipple Discharge] : no nipple discharge [No Axillary Lymphadenopathy] : no axillary lymphadenopathy [Soft] : abdomen soft [Non Tender] : non-tender [Non-distended] : non-distended [No Masses] : no abdominal mass palpated [No HSM] : no HSM [Normal Bowel Sounds] : normal bowel sounds [Normal Posterior Cervical Nodes] : no posterior cervical lymphadenopathy [Normal Anterior Cervical Nodes] : no anterior cervical lymphadenopathy [No CVA Tenderness] : no CVA  tenderness [No Spinal Tenderness] : no spinal tenderness [No Joint Swelling] : no joint swelling [No Rash] : no rash [Normal Gait] : normal gait [Coordination Grossly Intact] : coordination grossly intact [No Focal Deficits] : no focal deficits [Deep Tendon Reflexes (DTR)] : deep tendon reflexes were 2+ and symmetric [Speech Grossly Normal] : speech grossly normal [Memory Grossly Normal] : memory grossly normal [Normal Affect] : the affect was normal [Alert and Oriented x3] : oriented to person, place, and time [Normal Mood] : the mood was normal [Normal Insight/Judgement] : insight and judgment were intact [de-identified] : SEVERE LIMITATION ROM OF RIGHT SHOULDER, B/L HIPS

## 2018-11-08 NOTE — REVIEW OF SYSTEMS
[Joint Pain] : joint pain [Joint Stiffness] : joint stiffness [Muscle Weakness] : muscle weakness [Back Pain] : back pain [Anxiety] : anxiety [Negative] : Heme/Lymph [Joint Swelling] : no joint swelling [Muscle Pain] : no muscle pain [Suicidal] : not suicidal [Insomnia] : no insomnia [Depression] : no depression

## 2018-11-09 LAB
25(OH)D3 SERPL-MCNC: 61.2 NG/ML
ALBUMIN SERPL ELPH-MCNC: 4.3 G/DL
ALP BLD-CCNC: 96 U/L
ALT SERPL-CCNC: 11 U/L
ANION GAP SERPL CALC-SCNC: 16 MMOL/L
APPEARANCE: CLEAR
AST SERPL-CCNC: 26 U/L
BASOPHILS # BLD AUTO: 0.04 K/UL
BASOPHILS NFR BLD AUTO: 0.7 %
BILIRUB SERPL-MCNC: 0.5 MG/DL
BILIRUBIN URINE: NEGATIVE
BLOOD URINE: NEGATIVE
BUN SERPL-MCNC: 25 MG/DL
CALCIUM SERPL-MCNC: 9.6 MG/DL
CHLORIDE SERPL-SCNC: 97 MMOL/L
CHOLEST SERPL-MCNC: 169 MG/DL
CHOLEST/HDLC SERPL: 2.3 RATIO
CK SERPL-CCNC: 99 U/L
CO2 SERPL-SCNC: 26 MMOL/L
COLOR: YELLOW
CREAT SERPL-MCNC: 1.23 MG/DL
CREAT SPEC-SCNC: 66 MG/DL
CRP SERPL-MCNC: 0.12 MG/DL
EOSINOPHIL # BLD AUTO: 0.11 K/UL
EOSINOPHIL NFR BLD AUTO: 1.9 %
ERYTHROCYTE [SEDIMENTATION RATE] IN BLOOD BY WESTERGREN METHOD: 23 MM/HR
GLUCOSE QUALITATIVE U: NEGATIVE MG/DL
GLUCOSE SERPL-MCNC: 88 MG/DL
GLUCOSE SERPL-MCNC: 92 MG/DL
HBA1C MFR BLD HPLC: 5.5 %
HBV SURFACE AG SER QL: NONREACTIVE
HCT VFR BLD CALC: 41.2 %
HCV AB SER QL: NONREACTIVE
HCV S/CO RATIO: 0.11 S/CO
HDLC SERPL-MCNC: 75 MG/DL
HGB BLD-MCNC: 13.1 G/DL
IMM GRANULOCYTES NFR BLD AUTO: 0.2 %
KETONES URINE: NEGATIVE
LDLC SERPL CALC-MCNC: 73 MG/DL
LEUKOCYTE ESTERASE URINE: ABNORMAL
LYMPHOCYTES # BLD AUTO: 1.63 K/UL
LYMPHOCYTES NFR BLD AUTO: 28.2 %
MAGNESIUM SERPL-MCNC: 2.2 MG/DL
MAN DIFF?: NORMAL
MCHC RBC-ENTMCNC: 29.1 PG
MCHC RBC-ENTMCNC: 31.8 GM/DL
MCV RBC AUTO: 91.6 FL
MICROALBUMIN 24H UR DL<=1MG/L-MCNC: <1.2 MG/DL
MICROALBUMIN/CREAT 24H UR-RTO: NORMAL
MONOCYTES # BLD AUTO: 0.58 K/UL
MONOCYTES NFR BLD AUTO: 10 %
NEUTROPHILS # BLD AUTO: 3.42 K/UL
NEUTROPHILS NFR BLD AUTO: 59 %
NITRITE URINE: NEGATIVE
PH URINE: 5.5
PLATELET # BLD AUTO: 263 K/UL
POTASSIUM SERPL-SCNC: 4.2 MMOL/L
PROT SERPL-MCNC: 7 G/DL
PROTEIN URINE: NEGATIVE MG/DL
RBC # BLD: 4.5 M/UL
RBC # FLD: 13.6 %
SODIUM SERPL-SCNC: 139 MMOL/L
SPECIFIC GRAVITY URINE: 1.02
T PALLIDUM AB SER QL IA: NEGATIVE
TRIGL SERPL-MCNC: 104 MG/DL
TSH SERPL-ACNC: 2.67 UIU/ML
URATE SERPL-MCNC: 7.3 MG/DL
UROBILINOGEN URINE: NEGATIVE MG/DL
VIT B12 SERPL-MCNC: 1098 PG/ML
WBC # FLD AUTO: 5.79 K/UL

## 2018-11-27 ENCOUNTER — RX RENEWAL (OUTPATIENT)
Age: 83
End: 2018-11-27

## 2018-11-27 NOTE — ED ADULT NURSE NOTE - FALL HARM RISK CONCLUSION
Patient : Robinson Jones Jr. Age: 57 year old Sex: male   MRN: [id number] Encounter Date: 11/27/2018      History     Chief Complaint   Patient presents with   • Blurred Vision     HPI   This is a 57-year-old gentleman who presents to the emergency department with several days of blurred vision. His blood sugars have been reading high for the last several days since Thanksgiving. He said he can tell what his sugars up because he has trouble reading the numbers on the clock. He takes 60 units of Lantus twice a day and 30 units of Humalog 3 times a day with meals. He does not adjust his mealtime insulin based on his blood sugars. He says he also has some short-term memory troubles and thinks he might have early dementia. He cannot remember if he took his insulin.    Allergies   Allergen Reactions   • Bactrim SWELLING   • Penicillins RASH          Medication List      Prior to Admission Medications      Sig   * albuterol 108 (90 Base) MCG/ACT inhaler  Commonly known as:  PROAIR HFA   2 puffs, Inhalation, EVERY 4 HOURS PRN     * albuterol (2.5 MG/3ML) 0.083% nebulizer solution  Commonly known as:  VENTOLIN   2.5 mg, Nebulization, EVERY 6 HOURS PRN     ALLOPURINOL PO   Oral, 2 TIMES DAILY     atorvastatin 40 MG tablet  Commonly known as:  LIPITOR   40 mg, Oral, NIGHTLY     B-D U/F PEN NEEDLE 5/16\" 31G X 8 MM Misc  Generic drug:  Insulin Pen Needle   Use 4 times daily     blood glucose test strip  Commonly known as:  ONE TOUCH ULTRA TEST   Test blood sugar 4 times daily as directed. Diagnosis: DM2 E11.65. Meter: One Touch Ultra     carvedilol 25 MG tablet  Commonly known as:  COREG   25 mg, Oral, 2 TIMES DAILY     CENTURY MATURE ADULT FORMULA PO   1 tablet, Oral, DAILY     COLCHICINE PO   Oral, PRN     DISPENSE   (1) one aerosol mask for patients nebulizer  R06.2 Dx. Wheezing     dulaglutide 1.5 MG/0.5ML pen-injector  Commonly known as:  TRULICITY   1.5 mg, Subcutaneous, 1 DAY A WEEK     fluticasone 50 MCG/ACT nasal  spray  Commonly known as:  FLONASE   1 spray, Nasal, DAILY     furosemide 40 MG tablet  Commonly known as:  LASIX   40 mg, Oral, DAILY     insulin glargine 100 UNIT/ML pen-injector  Commonly known as:  LANTUS SOLOSTAR   60 Units, Subcutaneous, 2 TIMES DAILY     * insulin lispro 100 UNIT/ML pen-injector  Commonly known as:  HUMALOG KWIKPEN   30 Units, Subcutaneous, 3 TIMES DAILY BEFORE MEALS     * HUMALOG KWIKPEN 100 UNIT/ML pen-injector  Generic drug:  insulin lispro   INJECT SUBCUTANEOUSLY 15  UNITS 3 TIMES DAILY BEFORE  MEALS     lisinopril 10 MG tablet  Commonly known as:  ZESTRIL   TAKE 1 TABLET BY MOUTH  DAILY     loratadine 10 MG tablet  Commonly known as:  CLARITIN   10 mg, Oral, DAILY BEFORE BREAKFAST     montelukast 10 MG tablet  Commonly known as:  SINGULAIR   10 mg, Oral, EVERY EVENING     naproxen 500 MG tablet  Commonly known as:  NAPROSYN   500 mg, Oral, 2 TIMES DAILY WITH MEALS     OPSUMIT 10 MG tablet  Generic drug:  macitentan   10 mg, Oral, DAILY     Oxygen 20-80 % Kit   2 L/min, Nasal     pantoprazole 40 MG tablet  Commonly known as:  PROTONIX   Take 1 tablet by mouth  nightly     predniSONE 20 MG tablet  Commonly known as:  DELTASONE   Take 2 tablets by mouth every morning for 5 days. Then take 1 tablet daily for 2 days and 1/2 tablet daily for 2 days.     triamcinolone 0.1 % ointment  Commonly known as:  ARISTOCORT   Apply to area bid         * There are duplicate medications prescribed to the patient                Past Medical History:   Diagnosis Date   • Allergy    • Anxiety    • Cardiomyopathy, hypertensive (CMS/[facility])    • CHF (congestive heart failure) (CMS/[facility])    • Chronic kidney disease, stage III (moderate) (CMS/[facility]) 10/2/2014   • COPD (chronic obstructive pulmonary disease) (CMS/[facility])    • Depression    • Diabetes mellitus (CMS/[facility])    • Hypertension    • Obese    • Sleep apnea        Past Surgical History:   Procedure Laterality Date   • COLONOSCOPY     • FOOT SURGERY      right foot    • LEG SURGERY      laceration left leg   • SERVICE TO GASTROENTEROLOGY         Family History   Problem Relation Age of Onset   • Diabetes Mother    • Heart Mother    • Psychiatry Mother    • Heart Sister    • Heart Brother    • Heart Brother    • Diabetes Sister    • Diabetes Brother    • Diabetes Brother    • Psychiatry Sister    • * Father    • Psychiatry Brother    • Psychiatry Brother        Social History     Tobacco Use   • Smoking status: Former Smoker     Types: Cigarettes     Last attempt to quit: 2014     Years since quittin.3   • Smokeless tobacco: Never Used   Substance Use Topics   • Alcohol use: Yes     Alcohol/week: 1.8 oz     Types: 3 Standard drinks or equivalent per week     Comment: social events   • Drug use: No       Review of Systems   Constitutional: Negative for fever.   HENT: Negative for congestion.    Eyes: Positive for visual disturbance.   Respiratory: Negative for cough and shortness of breath.    Cardiovascular: Negative for chest pain.   Gastrointestinal: Negative for abdominal pain, diarrhea, nausea and vomiting.   Genitourinary: Negative for dysuria.   Musculoskeletal: Negative for arthralgias.   Skin: Negative for rash.   Neurological: Positive for dizziness and weakness. Negative for headaches.   All other systems reviewed and are negative.      Physical Exam     ED Triage Vitals [18 0025]   ED Triage Vitals Group      Temp       Pulse 93      Resp 18      BP 97/59      SpO2 95 %      EtCO2 mmHg       Height       Weight 299 lb 13.2 oz (136 kg)      Weight Scale Used ED Actual       Physical Exam   Constitutional: He appears well-developed and well-nourished. No distress.   HENT:   Head: Normocephalic and atraumatic.   Eyes: Conjunctivae and EOM are normal. Pupils are equal, round, and reactive to light.   Neck: Normal range of motion. Neck supple.   Cardiovascular: Normal rate, regular rhythm and normal heart sounds. Exam reveals no gallop and no friction rub.    No murmur heard.  Pulmonary/Chest: Effort normal and breath sounds normal. No respiratory distress.   Abdominal: Soft. Bowel sounds are normal. He exhibits no distension and no mass. There is no tenderness. There is no rebound and no guarding.   Morbidly obese   Neurological: He is alert.   Skin: Skin is warm and dry. No rash noted.   Nursing note and vitals reviewed.      ED Course     Procedures    Lab Results     Results for orders placed or performed during the hospital encounter of 11/27/18   CBC & Auto Differential   Result Value Ref Range    WBC 7.3 4.2 - 11.0 K/mcL    RBC 4.91 4.50 - 5.90 mil/mcL    HGB 14.9 13.0 - 17.0 g/dL    HCT 44.5 39.0 - 51.0 %    MCV 90.6 78.0 - 100.0 fl    MCH 30.3 26.0 - 34.0 pg    MCHC 33.5 32.0 - 36.5 g/dL    RDW-CV 13.4 11.0 - 15.0 %     140 - 450 K/mcL    DIFF TYPE AUTOMATED DIFFERENTIAL     Neutrophil 42 %    LYMPH 44 %    MONO 8 %    EOSIN 6 %    BASO 0 %    Absolute Neutrophil 3.1 1.8 - 7.7 K/mcL    Absolute Lymph 3.2 1.0 - 4.0 K/mcL    Absolute Mono 0.6 0.3 - 0.9 K/mcL    Absolute Eos 0.5 0.1 - 0.5 K/mcL    Absolute Baso 0.0 0.0 - 0.3 K/mcL   Comprehensive Metabolic Panel   Result Value Ref Range    Sodium 126 (L) 135 - 145 mmol/L    Potassium 5.4 (H) 3.4 - 5.1 mmol/L    Chloride 94 (L) 98 - 107 mmol/L    Carbon Dioxide 21 21 - 32 mmol/L    Anion Gap 16 10 - 20 mmol/L    Glucose 513 (HH) 65 - 99 mg/dL    BUN 64 (H) 6 - 20 mg/dL    Creatinine 3.47 (H) 0.67 - 1.17 mg/dL    GFR Estimate,  21     GFR Estimate, Non African American 18     BUN/Creatinine Ratio 18 7 - 25    CALCIUM 8.9 8.4 - 10.2 mg/dL    TOTAL BILIRUBIN 0.5 0.2 - 1.0 mg/dL    AST/SGOT 42 (H) <38 Units/L    ALT/SGPT 47 <79 Units/L    ALK PHOSPHATASE 117 45 - 117 Units/L    TOTAL PROTEIN 8.3 (H) 6.4 - 8.2 g/dL    Albumin 3.6 3.6 - 5.1 g/dL    GLOBULIN 4.7 (H) 2.0 - 4.0 g/dL    A/G Ratio, Serum 0.8 (L) 1.0 - 2.4   Troponin I Ultra Sensitive   Result Value Ref Range    TROPONIN I <0.02 <0.05  ng/mL   Chem 8 Panel - Point of Care   Result Value Ref Range    Sodium  (L) 135 - 145 mmol/L    Potassium POC 4.9 3.4 - 5.1 mmol/L    Chloride POC 99 98 - 107 mmol/L    CALCIUM IONIZED-POC 1.12 (L) 1.15 - 1.29 mmol/L    CO2 Total 23 19 - 24 mmol/L    GLUCOSE  (HH) 65 - 99 mg/dL    BUN POC 53 (H) 6 - 20 mg/dL    HEMATOCRIT POC 43.0 39.0 - 51.0 %    Hemoglobin POC 14.6 13.0 - 17.0 g/dL    ANION GAP POC 15 mmol/L    Creatinine POC 3.30 (H) 0.67 - 1.17 mg/dL    Estimated GFR  (POC) 23     Estimated GFR Non- (POC) 20    Metered blood glucose   Result Value Ref Range    Glucose Bedside  (HH) 65 - 99 mg/dL       EKG Results     Below is my review of the EKG.  Final interpretation pending formal review by Cardiology.  Normal sinus rhythm with a rate of 89. No acute ischemic changes noted.    Radiology Results     Imaging Results    None         ED Medication Orders (From admission, onward)    Start Ordered     Status Ordering Provider    11/27/18 0145 11/27/18 0145  insulin lispro (HumaLOG) injection 12 Units  ONCE      Acknowledged LEISA MCNEIL    11/27/18 0032 11/27/18 0031  sodium chloride (PF) 0.9 % injection 2 mL  (Capped IV)  ONCE      Acknowledged LEISA MCNEIL    11/27/18 0032 11/27/18 0031  sodium chloride (NORMAL SALINE) 0.9 % bolus 1,000 mL  ONCE      Last MAR action:  New Bag LEISA MCNEIL    11/27/18 0032 11/27/18 0031  insulin lispro (HumaLOG) injection 12 Units  ONCE      Last MAR action:  Given LEISA MCNEIL    11/27/18 0030 11/27/18 0031  ondansetron (ZOFRAN) injection 4 mg  ONCE PRN      Acknowledged LEISA MCNEIL    11/27/18 0030 11/27/18 0031  sodium chloride (PF) 0.9 % injection 2 mL  (Capped IV)  PRN      Acknowledged LEISA MCNEIL               MDM   An IV was started and he was given a liter of fluid and 12 units of Humalog. He was feeling better. His vision is improving and he feels less dizzy. His blood sugar has  come down to 460 and his creatinine is slightly improved to 3.3 but overall, still has acute kidney injury. I made arrangements for him to be admitted to the hospitalist at 1:50 AM.    Clinical Impression     ED Diagnosis   1. Hyperglycemia     2. Acute kidney injury (CMS/HCC)         Disposition        Admit 11/27/2018  1:51 AM  Patient accepted and admission order received from:: BASIM STOKES [[id number]]  Patient Class: Observation [4]  Patient on Telemetry: No  Has physician to physician communication occurred?: Yes  Transferring Patient to? Only adjust for transfers between [facility] ([facility], [facility], [facility]: [facility] [[id number]]       Criselda Mckinley MD  11/27/18 0155     Fall Risk

## 2018-12-04 ENCOUNTER — RX RENEWAL (OUTPATIENT)
Age: 83
End: 2018-12-04

## 2018-12-07 ENCOUNTER — RX RENEWAL (OUTPATIENT)
Age: 83
End: 2018-12-07

## 2019-01-15 ENCOUNTER — RX RENEWAL (OUTPATIENT)
Age: 84
End: 2019-01-15

## 2019-01-25 ENCOUNTER — RX RENEWAL (OUTPATIENT)
Age: 84
End: 2019-01-25

## 2019-02-15 ENCOUNTER — RX RENEWAL (OUTPATIENT)
Age: 84
End: 2019-02-15

## 2019-03-02 ENCOUNTER — APPOINTMENT (OUTPATIENT)
Dept: CARDIOLOGY | Facility: CLINIC | Age: 84
End: 2019-03-02

## 2019-03-04 ENCOUNTER — APPOINTMENT (OUTPATIENT)
Dept: INTERNAL MEDICINE | Facility: CLINIC | Age: 84
End: 2019-03-04
Payer: MEDICARE

## 2019-03-04 VITALS
RESPIRATION RATE: 12 BRPM | TEMPERATURE: 97.7 F | HEART RATE: 56 BPM | BODY MASS INDEX: 25.69 KG/M2 | WEIGHT: 145 LBS | HEIGHT: 63 IN | OXYGEN SATURATION: 96 %

## 2019-03-04 VITALS — DIASTOLIC BLOOD PRESSURE: 85 MMHG | SYSTOLIC BLOOD PRESSURE: 138 MMHG

## 2019-03-04 PROCEDURE — 99215 OFFICE O/P EST HI 40 MIN: CPT

## 2019-03-04 RX ORDER — MECLIZINE HYDROCHLORIDE 12.5 MG/1
12.5 TABLET ORAL
Qty: 90 | Refills: 1 | Status: DISCONTINUED | COMMUNITY
Start: 2017-01-19 | End: 2019-03-04

## 2019-03-04 RX ORDER — DULOXETINE HYDROCHLORIDE 20 MG/1
20 CAPSULE, DELAYED RELEASE PELLETS ORAL AT BEDTIME
Qty: 15 | Refills: 0 | Status: DISCONTINUED | COMMUNITY
Start: 2018-07-19 | End: 2019-03-04

## 2019-03-04 RX ORDER — DICLOFENAC SODIUM 10 MG/G
1 GEL TOPICAL
Qty: 1 | Refills: 3 | Status: DISCONTINUED | COMMUNITY
Start: 2018-07-19 | End: 2019-03-04

## 2019-03-04 RX ORDER — DULOXETINE HYDROCHLORIDE 20 MG/1
20 CAPSULE, DELAYED RELEASE PELLETS ORAL TWICE DAILY
Qty: 15 | Refills: 3 | Status: DISCONTINUED | COMMUNITY
Start: 2018-11-27 | End: 2019-03-04

## 2019-03-04 NOTE — REVIEW OF SYSTEMS
[Earache] : earache [Joint Pain] : joint pain [Joint Stiffness] : joint stiffness [Memory Loss] : memory loss [Unsteady Walking] : ataxia [Anxiety] : anxiety [Negative] : Heme/Lymph [Nasal Discharge] : no nasal discharge [Joint Swelling] : no joint swelling [Muscle Pain] : no muscle pain [Back Pain] : no back pain [Headache] : no headache [Dizziness] : no dizziness [Fainting] : no fainting [Confusion] : no confusion [Suicidal] : not suicidal [Insomnia] : no insomnia [Depression] : no depression [FreeTextEntry4] : LACK SENSE OF SMELL AND TASTE

## 2019-03-04 NOTE — PHYSICAL EXAM

## 2019-03-05 LAB
ALBUMIN SERPL ELPH-MCNC: 4.2 G/DL
ALP BLD-CCNC: 98 U/L
ALT SERPL-CCNC: 16 U/L
ANION GAP SERPL CALC-SCNC: 15 MMOL/L
APPEARANCE: CLEAR
AST SERPL-CCNC: 24 U/L
BILIRUB SERPL-MCNC: 0.6 MG/DL
BILIRUBIN URINE: NEGATIVE
BLOOD URINE: NEGATIVE
BUN SERPL-MCNC: 26 MG/DL
CALCIUM SERPL-MCNC: 9.6 MG/DL
CHLORIDE SERPL-SCNC: 100 MMOL/L
CHOLEST SERPL-MCNC: 192 MG/DL
CHOLEST/HDLC SERPL: 2.5 RATIO
CO2 SERPL-SCNC: 27 MMOL/L
COLOR: NORMAL
CREAT SERPL-MCNC: 1.13 MG/DL
CREAT SPEC-SCNC: 32 MG/DL
ERYTHROCYTE [SEDIMENTATION RATE] IN BLOOD BY WESTERGREN METHOD: 11 MM/HR
FERRITIN SERPL-MCNC: 65 NG/ML
FOLATE SERPL-MCNC: >20 NG/ML
GLUCOSE QUALITATIVE U: NEGATIVE
GLUCOSE SERPL-MCNC: 93 MG/DL
GLUCOSE SERPL-MCNC: 96 MG/DL
HBA1C MFR BLD HPLC: 5.6 %
HDLC SERPL-MCNC: 76 MG/DL
KETONES URINE: NEGATIVE
LDLC SERPL CALC-MCNC: 93 MG/DL
LEUKOCYTE ESTERASE URINE: NEGATIVE
MICROALBUMIN 24H UR DL<=1MG/L-MCNC: <1.2 MG/DL
MICROALBUMIN/CREAT 24H UR-RTO: NORMAL MG/G
NITRITE URINE: NEGATIVE
PH URINE: 6.5
POTASSIUM SERPL-SCNC: 3.8 MMOL/L
PROT SERPL-MCNC: 6.6 G/DL
PROTEIN URINE: NEGATIVE
RHEUMATOID FACT SER QL: <10 IU/ML
SODIUM SERPL-SCNC: 142 MMOL/L
SPECIFIC GRAVITY URINE: 1.01
TRIGL SERPL-MCNC: 114 MG/DL
TSH SERPL-ACNC: 2.02 UIU/ML
UROBILINOGEN URINE: NORMAL
VIT B12 SERPL-MCNC: 863 PG/ML

## 2019-03-06 LAB
BASOPHILS # BLD AUTO: 0.06 K/UL
BASOPHILS NFR BLD AUTO: 0.7 %
EOSINOPHIL # BLD AUTO: 0.15 K/UL
EOSINOPHIL NFR BLD AUTO: 1.7 %
HCT VFR BLD CALC: 40.4 %
HGB BLD-MCNC: 12.7 G/DL
IMM GRANULOCYTES NFR BLD AUTO: 0.2 %
LYMPHOCYTES # BLD AUTO: 1.54 K/UL
LYMPHOCYTES NFR BLD AUTO: 17.1 %
MAN DIFF?: NORMAL
MCHC RBC-ENTMCNC: 29.3 PG
MCHC RBC-ENTMCNC: 31.4 GM/DL
MCV RBC AUTO: 93.3 FL
MONOCYTES # BLD AUTO: 0.65 K/UL
MONOCYTES NFR BLD AUTO: 7.2 %
NEUTROPHILS # BLD AUTO: 6.59 K/UL
NEUTROPHILS NFR BLD AUTO: 73.1 %
PLATELET # BLD AUTO: 266 K/UL
RBC # BLD: 4.33 M/UL
RBC # FLD: 13.4 %
WBC # FLD AUTO: 9.01 K/UL

## 2019-04-02 ENCOUNTER — RX RENEWAL (OUTPATIENT)
Age: 84
End: 2019-04-02

## 2019-04-29 ENCOUNTER — RX RENEWAL (OUTPATIENT)
Age: 84
End: 2019-04-29

## 2019-05-20 ENCOUNTER — APPOINTMENT (OUTPATIENT)
Dept: INTERNAL MEDICINE | Facility: CLINIC | Age: 84
End: 2019-05-20
Payer: MEDICARE

## 2019-05-20 VITALS
OXYGEN SATURATION: 99 % | TEMPERATURE: 97.7 F | WEIGHT: 146 LBS | SYSTOLIC BLOOD PRESSURE: 118 MMHG | DIASTOLIC BLOOD PRESSURE: 71 MMHG | RESPIRATION RATE: 14 BRPM | BODY MASS INDEX: 25.87 KG/M2 | HEART RATE: 58 BPM | HEIGHT: 63 IN

## 2019-05-20 PROCEDURE — 99214 OFFICE O/P EST MOD 30 MIN: CPT

## 2019-05-20 RX ORDER — FLUTICASONE PROPIONATE 50 UG/1
50 SPRAY, METERED NASAL DAILY
Qty: 1 | Refills: 5 | Status: DISCONTINUED | COMMUNITY
Start: 2017-01-19 | End: 2019-05-20

## 2019-05-20 NOTE — REVIEW OF SYSTEMS
[Joint Pain] : joint pain [Joint Stiffness] : joint stiffness [Anxiety] : anxiety [Negative] : Heme/Lymph [Joint Swelling] : no joint swelling [Muscle Pain] : no muscle pain [Back Pain] : no back pain [Suicidal] : not suicidal [Insomnia] : no insomnia [Depression] : no depression

## 2019-05-22 LAB
25(OH)D3 SERPL-MCNC: 40.5 NG/ML
ALBUMIN SERPL ELPH-MCNC: 4.2 G/DL
ALP BLD-CCNC: 101 U/L
ALT SERPL-CCNC: 16 U/L
ANION GAP SERPL CALC-SCNC: 17 MMOL/L
AST SERPL-CCNC: 21 U/L
BASOPHILS # BLD AUTO: 0.06 K/UL
BASOPHILS NFR BLD AUTO: 1 %
BILIRUB SERPL-MCNC: 0.5 MG/DL
BUN SERPL-MCNC: 28 MG/DL
CALCIUM SERPL-MCNC: 9.3 MG/DL
CHLORIDE SERPL-SCNC: 100 MMOL/L
CHOLEST SERPL-MCNC: 182 MG/DL
CHOLEST/HDLC SERPL: 2.5 RATIO
CO2 SERPL-SCNC: 24 MMOL/L
CREAT SERPL-MCNC: 1.16 MG/DL
CRP SERPL-MCNC: 0.16 MG/DL
ENA SS-A AB SER IA-ACNC: <0.2 AL
ENA SS-B AB SER IA-ACNC: <0.2 AL
EOSINOPHIL # BLD AUTO: 0.27 K/UL
EOSINOPHIL NFR BLD AUTO: 4.4 %
ERYTHROCYTE [SEDIMENTATION RATE] IN BLOOD BY WESTERGREN METHOD: 24 MM/HR
ESTIMATED AVERAGE GLUCOSE: 114 MG/DL
FERRITIN SERPL-MCNC: 53 NG/ML
GLUCOSE SERPL-MCNC: 90 MG/DL
GLUCOSE SERPL-MCNC: 93 MG/DL
HBA1C MFR BLD HPLC: 5.6 %
HCT VFR BLD CALC: 38.6 %
HDLC SERPL-MCNC: 73 MG/DL
HGB BLD-MCNC: 12.3 G/DL
IMM GRANULOCYTES NFR BLD AUTO: 0.3 %
LDLC SERPL CALC-MCNC: 87 MG/DL
LYMPHOCYTES # BLD AUTO: 1.36 K/UL
LYMPHOCYTES NFR BLD AUTO: 22.1 %
MAGNESIUM SERPL-MCNC: 2.1 MG/DL
MAN DIFF?: NORMAL
MCHC RBC-ENTMCNC: 29.7 PG
MCHC RBC-ENTMCNC: 31.9 GM/DL
MCV RBC AUTO: 93.2 FL
MONOCYTES # BLD AUTO: 0.63 K/UL
MONOCYTES NFR BLD AUTO: 10.3 %
NEUTROPHILS # BLD AUTO: 3.8 K/UL
NEUTROPHILS NFR BLD AUTO: 61.9 %
PLATELET # BLD AUTO: 248 K/UL
POTASSIUM SERPL-SCNC: 3.7 MMOL/L
PROT SERPL-MCNC: 6.4 G/DL
RBC # BLD: 4.14 M/UL
RBC # FLD: 13.3 %
SODIUM SERPL-SCNC: 141 MMOL/L
TRIGL SERPL-MCNC: 109 MG/DL
TSH SERPL-ACNC: 2.09 UIU/ML
VIT B12 SERPL-MCNC: 850 PG/ML
WBC # FLD AUTO: 6.14 K/UL

## 2019-05-23 LAB
APPEARANCE: CLEAR
BACTERIA: NEGATIVE
BILIRUBIN URINE: NEGATIVE
BLOOD URINE: NEGATIVE
COLOR: YELLOW
GLUCOSE QUALITATIVE U: NEGATIVE
HYALINE CASTS: 1 /LPF
KETONES URINE: NEGATIVE
LEUKOCYTE ESTERASE URINE: NEGATIVE
MICROSCOPIC-UA: NORMAL
NITRITE URINE: NEGATIVE
PH URINE: 6
PROTEIN URINE: NEGATIVE
RED BLOOD CELLS URINE: 4 /HPF
SPECIFIC GRAVITY URINE: 1.01
SQUAMOUS EPITHELIAL CELLS: 1 /HPF
UROBILINOGEN URINE: NORMAL
WHITE BLOOD CELLS URINE: 1 /HPF

## 2019-06-13 ENCOUNTER — NON-APPOINTMENT (OUTPATIENT)
Age: 84
End: 2019-06-13

## 2019-06-13 ENCOUNTER — APPOINTMENT (OUTPATIENT)
Dept: CARDIOLOGY | Facility: CLINIC | Age: 84
End: 2019-06-13
Payer: MEDICARE

## 2019-06-13 VITALS
TEMPERATURE: 98.3 F | DIASTOLIC BLOOD PRESSURE: 70 MMHG | OXYGEN SATURATION: 99 % | RESPIRATION RATE: 12 BRPM | HEIGHT: 63 IN | WEIGHT: 145 LBS | BODY MASS INDEX: 25.69 KG/M2 | HEART RATE: 63 BPM | SYSTOLIC BLOOD PRESSURE: 110 MMHG

## 2019-06-13 PROCEDURE — 99213 OFFICE O/P EST LOW 20 MIN: CPT

## 2019-06-13 PROCEDURE — 93000 ELECTROCARDIOGRAM COMPLETE: CPT

## 2019-07-29 ENCOUNTER — RX RENEWAL (OUTPATIENT)
Age: 84
End: 2019-07-29

## 2019-07-31 NOTE — ED CDU PROVIDER INITIAL DAY NOTE - CROS ED MUSC ALL NEG
Patient : Juana Malik Age: 70 Weight Scale Used ED Actual       Physical Exam   Constitutional: She is oriented to person, place, and time. She appears well-developed. She appears cachectic. She is cooperative.  Non-toxic appearance. She does not have a sickly appearance. No distress.   Poor overall hygiene  Appears disheveled and intoxicated  Positive for slurred speech    HENT:   Head: Normocephalic and atraumatic.   Nose: No rhinorrhea.   Mouth/Throat: No trismus in the jaw. Normal dentition. No dental caries. No posterior oropharyngeal edema or posterior oropharyngeal erythema.   Eyes: Pupils are equal, round, and reactive to light. Conjunctivae and lids are normal. Right eye exhibits no discharge. Left eye exhibits no discharge. Right conjunctiva is not injected. Left conjunctiva is not injected. No scleral icterus.   Cardiovascular: Normal rate, regular rhythm, S1 normal, S2 normal and normal heart sounds.   No murmur heard.  Pulmonary/Chest: Effort normal and breath sounds normal. No accessory muscle usage or stridor. No tachypnea and no bradypnea. No respiratory distress. She has no decreased breath sounds. She has no rhonchi.   Abdominal: Soft. Bowel sounds are normal. She exhibits no distension and no ascites. There is no tenderness. There is no rigidity, no CVA tenderness, no tenderness at McBurney's point and negative Anderson's sign.   Musculoskeletal: Normal range of motion. She exhibits no edema or tenderness.   Neurological: She is alert and oriented to person, place, and time. She displays no atrophy. No cranial nerve deficit or sensory deficit. She exhibits normal muscle tone.   Skin: Skin is warm, dry and intact. No rash noted. She is not diaphoretic. No cyanosis or erythema. No pallor.   Psychiatric: Her behavior is normal. Her speech is slurred.   Nursing note and vitals reviewed.      ED Course     Procedures             MDM  Vitals  Amie Nicholas acting as a scribe for Dr. Rika Jordan   Dictation # [id number]  Scribe: Amie Aguilera DO  08/06/19 1053     negative...

## 2019-09-09 ENCOUNTER — APPOINTMENT (OUTPATIENT)
Dept: CARDIOLOGY | Facility: CLINIC | Age: 84
End: 2019-09-09
Payer: MEDICARE

## 2019-09-09 ENCOUNTER — NON-APPOINTMENT (OUTPATIENT)
Age: 84
End: 2019-09-09

## 2019-09-09 VITALS
BODY MASS INDEX: 25.52 KG/M2 | DIASTOLIC BLOOD PRESSURE: 85 MMHG | SYSTOLIC BLOOD PRESSURE: 131 MMHG | WEIGHT: 144 LBS | HEIGHT: 63 IN | TEMPERATURE: 98.1 F | OXYGEN SATURATION: 97 % | RESPIRATION RATE: 12 BRPM | HEART RATE: 55 BPM

## 2019-09-09 PROCEDURE — 99214 OFFICE O/P EST MOD 30 MIN: CPT | Mod: 25

## 2019-09-09 PROCEDURE — 93000 ELECTROCARDIOGRAM COMPLETE: CPT

## 2019-09-09 NOTE — PHYSICAL EXAM
[General Appearance - Well Developed] : well developed [Normal Appearance] : normal appearance [Well Groomed] : well groomed [General Appearance - Well Nourished] : well nourished [No Deformities] : no deformities [General Appearance - In No Acute Distress] : no acute distress [Normal Conjunctiva] : the conjunctiva exhibited no abnormalities [Normal Oral Mucosa] : normal oral mucosa [No Oral Pallor] : no oral pallor [No Oral Cyanosis] : no oral cyanosis [Normal Jugular Venous A Waves Present] : normal jugular venous A waves present [Normal Jugular Venous V Waves Present] : normal jugular venous V waves present [Auscultation Breath Sounds / Voice Sounds] : lungs were clear to auscultation bilaterally [Exaggerated Use Of Accessory Muscles For Inspiration] : no accessory muscle use [Respiration, Rhythm And Depth] : normal respiratory rhythm and effort [Normal Rate] : normal [Rhythm Regular] : regular [Normal S1] : normal S1 [No Murmur] : no murmurs heard [Normal S2] : normal S2 [Abdomen Soft] : soft [No Pitting Edema] : no pitting edema present [Abdomen Tenderness] : non-tender [Abnormal Walk] : normal gait [Nail Clubbing] : no clubbing of the fingernails [Petechial Hemorrhages (___cm)] : no petechial hemorrhages [Cyanosis, Localized] : no localized cyanosis [] : no rash [Skin Color & Pigmentation] : normal skin color and pigmentation [Oriented To Time, Place, And Person] : oriented to person, place, and time [Affect] : the affect was normal [Mood] : the mood was normal [No Anxiety] : not feeling anxious [FreeTextEntry1] : Extraocular muscles intact. Anicteric sclerae. [Right Carotid Bruit] : no bruit heard over the right carotid [Left Carotid Bruit] : no bruit heard over the left carotid [Bruit] : no bruit heard [Bowel Sounds] : normal bowel sounds [No Skin Ulcers] : no skin ulcer

## 2019-09-09 NOTE — DISCUSSION/SUMMARY
[FreeTextEntry1] : IMPRESSION: Ms. Aguilera is an 89 year old woman with a history of CAD s/p stents in the past, hypertension, hyperlipidemia, arthritis, spinal stenosis, and former tobacco use who presents today for evaluation of coronary artery disease and hypertension.\par \par PLAN:\par 1. Given her history of CAD, she will schedule an echocardiogram to evaluate her LV and valve function. She will continue on Aspirin 81mg and Plavix 75mg daily given her stent placement.\par 2. Her blood pressure is well controlled, thus she will continue on HCTZ 12.5mg daily and Metoprolol ER 50mg daily. She will bring in her medications for review at the time of her next visit and we will consider decreasing the medications.\par 3. She will continue on Rosuvastatin 5mg for her cholesterol. Her most recent LDL last month was good.\par 4. She will follow up in 2 months or sooner if she is symptomatic.

## 2019-09-09 NOTE — HISTORY OF PRESENT ILLNESS
[FreeTextEntry1] : Patient is an 89 year old woman with a history of CAD s/p two stents placed in the past, hypertension, hyperlipidemia, arthritis, spinal stenosis, and former tobacco use who presents today for evaluation of coronary artery disease and hypertension. She reports a recent loss of taste and smell, as well as a decreased appetite. She also has noticed increased weakness and fatigue. She wants to stop taking so many medications. She otherwise denies any chest pain, shortness of breath, palpitations, headaches or dizziness.

## 2019-09-10 LAB
25(OH)D3 SERPL-MCNC: 42.6 NG/ML
ALBUMIN SERPL ELPH-MCNC: 4.3 G/DL
ALP BLD-CCNC: 88 U/L
ALT SERPL-CCNC: 19 U/L
ANION GAP SERPL CALC-SCNC: 13 MMOL/L
AST SERPL-CCNC: 26 U/L
BASOPHILS # BLD AUTO: 0.07 K/UL
BASOPHILS NFR BLD AUTO: 1.3 %
BILIRUB SERPL-MCNC: 0.7 MG/DL
BUN SERPL-MCNC: 19 MG/DL
CALCIUM SERPL-MCNC: 9.8 MG/DL
CHLORIDE SERPL-SCNC: 99 MMOL/L
CHOLEST SERPL-MCNC: 183 MG/DL
CHOLEST/HDLC SERPL: 2.3 RATIO
CO2 SERPL-SCNC: 28 MMOL/L
CREAT SERPL-MCNC: 1.07 MG/DL
EOSINOPHIL # BLD AUTO: 0.15 K/UL
EOSINOPHIL NFR BLD AUTO: 2.7 %
GLUCOSE SERPL-MCNC: 104 MG/DL
HCT VFR BLD CALC: 40.9 %
HDLC SERPL-MCNC: 80 MG/DL
HGB BLD-MCNC: 13.1 G/DL
IMM GRANULOCYTES NFR BLD AUTO: 0.4 %
LDLC SERPL CALC-MCNC: 87 MG/DL
LYMPHOCYTES # BLD AUTO: 1.34 K/UL
LYMPHOCYTES NFR BLD AUTO: 24.1 %
MAN DIFF?: NORMAL
MCHC RBC-ENTMCNC: 29.4 PG
MCHC RBC-ENTMCNC: 32 GM/DL
MCV RBC AUTO: 91.9 FL
MONOCYTES # BLD AUTO: 0.53 K/UL
MONOCYTES NFR BLD AUTO: 9.5 %
NEUTROPHILS # BLD AUTO: 3.44 K/UL
NEUTROPHILS NFR BLD AUTO: 62 %
PLATELET # BLD AUTO: 268 K/UL
POTASSIUM SERPL-SCNC: 3.6 MMOL/L
PROT SERPL-MCNC: 6.7 G/DL
RBC # BLD: 4.45 M/UL
RBC # FLD: 13.4 %
SODIUM SERPL-SCNC: 139 MMOL/L
TRIGL SERPL-MCNC: 78 MG/DL
TSH SERPL-ACNC: 2.12 UIU/ML
WBC # FLD AUTO: 5.55 K/UL

## 2019-09-11 LAB
APPEARANCE: CLEAR
BILIRUBIN URINE: NEGATIVE
BLOOD URINE: NEGATIVE
COLOR: NORMAL
GLUCOSE QUALITATIVE U: NEGATIVE
KETONES URINE: NEGATIVE
LEUKOCYTE ESTERASE URINE: NEGATIVE
NITRITE URINE: NEGATIVE
PH URINE: 7
PROTEIN URINE: NEGATIVE
SPECIFIC GRAVITY URINE: 1.01
UROBILINOGEN URINE: NORMAL

## 2019-09-15 NOTE — DISCUSSION/SUMMARY
[FreeTextEntry1] : IMPRESSION: Ms. Aguilera is an 89 year old woman with a history of CAD s/p stents in the past, hypertension, hyperlipidemia, arthritis, spinal stenosis, and former tobacco use who presents today for follow up of coronary artery disease and hypertension.\par \par PLAN:\par 1. Given her history of CAD, she will schedule an echocardiogram to evaluate her LV function. She will continue on Aspirin 81mg and Plavix 75mg daily given her stent placement. I have explained to her the importance of continuing on her medical regimen.\par 2. Her blood pressure is adequately controlled, thus she will continue on HCTZ 12.5mg daily and Metoprolol ER 50mg daily. \par 3. She will continue on Rosuvastatin 5mg on Monday, Wednesday and Friday for her cholesterol. I will be checking a lipid profile and CMP today.\par 4. She will follow up in 3 months or sooner if she is symptomatic.\par 5. I have asked her to follow up with you and consider seeing ENT given her reported loss of taste and smell.

## 2019-09-15 NOTE — REVIEW OF SYSTEMS
[Joint Pain] : joint pain [Feeling Fatigued] : feeling fatigued [Negative] : Heme/Lymph [see HPI] : see HPI

## 2019-09-15 NOTE — PHYSICAL EXAM
[Abdomen Soft] : soft [Abdomen Tenderness] : non-tender [Abnormal Walk] : normal gait [Nail Clubbing] : no clubbing of the fingernails [Cyanosis, Localized] : no localized cyanosis [Petechial Hemorrhages (___cm)] : no petechial hemorrhages [Skin Color & Pigmentation] : normal skin color and pigmentation [Oriented To Time, Place, And Person] : oriented to person, place, and time [Affect] : the affect was normal [No Anxiety] : not feeling anxious [Mood] : the mood was normal [General Appearance - Well Developed] : well developed [Normal Appearance] : normal appearance [Well Groomed] : well groomed [General Appearance - Well Nourished] : well nourished [No Deformities] : no deformities [General Appearance - In No Acute Distress] : no acute distress [Normal Conjunctiva] : the conjunctiva exhibited no abnormalities [No Oral Pallor] : no oral pallor [Normal Oral Mucosa] : normal oral mucosa [No Oral Cyanosis] : no oral cyanosis [Normal Jugular Venous A Waves Present] : normal jugular venous A waves present [] : no respiratory distress [Normal Jugular Venous V Waves Present] : normal jugular venous V waves present [Auscultation Breath Sounds / Voice Sounds] : lungs were clear to auscultation bilaterally [Exaggerated Use Of Accessory Muscles For Inspiration] : no accessory muscle use [Respiration, Rhythm And Depth] : normal respiratory rhythm and effort [Rhythm Regular] : regular [Normal S1] : normal S1 [Normal S2] : normal S2 [No Pitting Edema] : no pitting edema present [FreeTextEntry1] : Extraocular muscles intact. Anicteric sclerae. [S3] : no S3 [Bradycardia] : bradycardic [I] : a grade 1 [Left Carotid Bruit] : no bruit heard over the left carotid [Right Carotid Bruit] : no bruit heard over the right carotid [Bruit] : no bruit heard [Bowel Sounds] : normal bowel sounds [No Skin Ulcers] : no skin ulcer

## 2019-10-08 ENCOUNTER — APPOINTMENT (OUTPATIENT)
Dept: CARDIOLOGY | Facility: CLINIC | Age: 84
End: 2019-10-08
Payer: MEDICARE

## 2019-10-08 PROCEDURE — 93306 TTE W/DOPPLER COMPLETE: CPT

## 2019-10-15 ENCOUNTER — APPOINTMENT (OUTPATIENT)
Dept: INTERNAL MEDICINE | Facility: CLINIC | Age: 84
End: 2019-10-15
Payer: MEDICARE

## 2019-10-15 VITALS
RESPIRATION RATE: 12 BRPM | SYSTOLIC BLOOD PRESSURE: 138 MMHG | OXYGEN SATURATION: 98 % | DIASTOLIC BLOOD PRESSURE: 78 MMHG | WEIGHT: 147 LBS | HEIGHT: 63 IN | BODY MASS INDEX: 26.05 KG/M2 | HEART RATE: 57 BPM | TEMPERATURE: 97.9 F

## 2019-10-15 PROCEDURE — 99214 OFFICE O/P EST MOD 30 MIN: CPT

## 2019-10-15 NOTE — REVIEW OF SYSTEMS
[Itching] : Itching [Skin Rash] : skin rash [Negative] : Heme/Lymph [Joint Pain] : joint pain [Joint Stiffness] : joint stiffness [Back Pain] : no back pain [FreeTextEntry9] : LEFT WRIST [de-identified] : LEFT BREAST FOLD

## 2019-10-15 NOTE — PHYSICAL EXAM
[No Acute Distress] : no acute distress [Well Nourished] : well nourished [Well Developed] : well developed [Well-Appearing] : well-appearing [Normal Sclera/Conjunctiva] : normal sclera/conjunctiva [PERRL] : pupils equal round and reactive to light [EOMI] : extraocular movements intact [Normal Outer Ear/Nose] : the outer ears and nose were normal in appearance [Normal Oropharynx] : the oropharynx was normal [No JVD] : no jugular venous distention [No Lymphadenopathy] : no lymphadenopathy [Supple] : supple [Thyroid Normal, No Nodules] : the thyroid was normal and there were no nodules present [No Respiratory Distress] : no respiratory distress  [No Accessory Muscle Use] : no accessory muscle use [Clear to Auscultation] : lungs were clear to auscultation bilaterally [Normal Rate] : normal rate  [Regular Rhythm] : with a regular rhythm [Normal S1, S2] : normal S1 and S2 [No Murmur] : no murmur heard [No Carotid Bruits] : no carotid bruits [No Abdominal Bruit] : a ~M bruit was not heard ~T in the abdomen [No Varicosities] : no varicosities [Pedal Pulses Present] : the pedal pulses are present [No Edema] : there was no peripheral edema [No Palpable Aorta] : no palpable aorta [No Extremity Clubbing/Cyanosis] : no extremity clubbing/cyanosis [Soft] : abdomen soft [Non Tender] : non-tender [Non-distended] : non-distended [No Masses] : no abdominal mass palpated [No HSM] : no HSM [Normal Bowel Sounds] : normal bowel sounds [Normal Posterior Cervical Nodes] : no posterior cervical lymphadenopathy [Normal Anterior Cervical Nodes] : no anterior cervical lymphadenopathy [No CVA Tenderness] : no CVA  tenderness [No Spinal Tenderness] : no spinal tenderness [No Joint Swelling] : no joint swelling [Grossly Normal Strength/Tone] : grossly normal strength/tone [Coordination Grossly Intact] : coordination grossly intact [No Focal Deficits] : no focal deficits [Normal Gait] : normal gait [Deep Tendon Reflexes (DTR)] : deep tendon reflexes were 2+ and symmetric [Normal Affect] : the affect was normal [Normal Insight/Judgement] : insight and judgment were intact [de-identified] : LEFT BREAST FOLD WITH A DIFFUSE MACULAR ERYTHEMATOUS NON VESICULAR RASH, WITH CLEAN BASE SMALL AREAS OF ULCERATION.

## 2019-12-10 ENCOUNTER — APPOINTMENT (OUTPATIENT)
Dept: CARDIOLOGY | Facility: CLINIC | Age: 84
End: 2019-12-10

## 2020-03-06 ENCOUNTER — APPOINTMENT (OUTPATIENT)
Dept: INTERNAL MEDICINE | Facility: CLINIC | Age: 85
End: 2020-03-06
Payer: MEDICARE

## 2020-03-06 ENCOUNTER — LABORATORY RESULT (OUTPATIENT)
Age: 85
End: 2020-03-06

## 2020-03-06 VITALS
WEIGHT: 155 LBS | OXYGEN SATURATION: 97 % | BODY MASS INDEX: 27.46 KG/M2 | TEMPERATURE: 98.4 F | SYSTOLIC BLOOD PRESSURE: 123 MMHG | HEART RATE: 60 BPM | DIASTOLIC BLOOD PRESSURE: 76 MMHG | HEIGHT: 63 IN | RESPIRATION RATE: 12 BRPM

## 2020-03-06 PROCEDURE — 99214 OFFICE O/P EST MOD 30 MIN: CPT

## 2020-03-06 NOTE — PHYSICAL EXAM
[No Acute Distress] : no acute distress [Well Nourished] : well nourished [Well Developed] : well developed [Well-Appearing] : well-appearing [Normal Sclera/Conjunctiva] : normal sclera/conjunctiva [PERRL] : pupils equal round and reactive to light [EOMI] : extraocular movements intact [Normal Outer Ear/Nose] : the outer ears and nose were normal in appearance [Normal Oropharynx] : the oropharynx was normal [No JVD] : no jugular venous distention [No Lymphadenopathy] : no lymphadenopathy [Supple] : supple [Thyroid Normal, No Nodules] : the thyroid was normal and there were no nodules present [No Respiratory Distress] : no respiratory distress  [No Accessory Muscle Use] : no accessory muscle use [Clear to Auscultation] : lungs were clear to auscultation bilaterally [Normal Rate] : normal rate  [Regular Rhythm] : with a regular rhythm [Normal S1, S2] : normal S1 and S2 [No Murmur] : no murmur heard [No Carotid Bruits] : no carotid bruits [No Abdominal Bruit] : a ~M bruit was not heard ~T in the abdomen [No Varicosities] : no varicosities [Pedal Pulses Present] : the pedal pulses are present [No Edema] : there was no peripheral edema [No Palpable Aorta] : no palpable aorta [No Extremity Clubbing/Cyanosis] : no extremity clubbing/cyanosis [Soft] : abdomen soft [Non Tender] : non-tender [Non-distended] : non-distended [No Masses] : no abdominal mass palpated [No HSM] : no HSM [Normal Bowel Sounds] : normal bowel sounds [Normal Posterior Cervical Nodes] : no posterior cervical lymphadenopathy [Normal Anterior Cervical Nodes] : no anterior cervical lymphadenopathy [No CVA Tenderness] : no CVA  tenderness [No Spinal Tenderness] : no spinal tenderness [No Joint Swelling] : no joint swelling [Grossly Normal Strength/Tone] : grossly normal strength/tone [Coordination Grossly Intact] : coordination grossly intact [No Focal Deficits] : no focal deficits [Normal Gait] : normal gait [Deep Tendon Reflexes (DTR)] : deep tendon reflexes were 2+ and symmetric [Normal Affect] : the affect was normal [Normal Insight/Judgement] : insight and judgment were intact

## 2020-03-06 NOTE — REVIEW OF SYSTEMS
[Joint Pain] : joint pain [Joint Stiffness] : joint stiffness [Negative] : Heme/Lymph [Nocturia] : nocturia [Dysuria] : no dysuria [Incontinence] : no incontinence [Hematuria] : no hematuria [Frequency] : no frequency [Vaginal Discharge] : no vaginal discharge [Joint Swelling] : no joint swelling [Muscle Pain] : no muscle pain [Back Pain] : no back pain [FreeTextEntry9] : LEFT WRIST

## 2020-03-07 LAB
25(OH)D3 SERPL-MCNC: 64.1 NG/ML
ALBUMIN SERPL ELPH-MCNC: 4.3 G/DL
ALP BLD-CCNC: 91 U/L
ALT SERPL-CCNC: 13 U/L
ANION GAP SERPL CALC-SCNC: 13 MMOL/L
APPEARANCE: CLEAR
AST SERPL-CCNC: 22 U/L
BASOPHILS # BLD AUTO: 0.06 K/UL
BASOPHILS NFR BLD AUTO: 1.1 %
BILIRUB SERPL-MCNC: 0.5 MG/DL
BILIRUBIN URINE: NEGATIVE
BLOOD URINE: NEGATIVE
BUN SERPL-MCNC: 32 MG/DL
CALCIUM SERPL-MCNC: 9.6 MG/DL
CHLORIDE SERPL-SCNC: 100 MMOL/L
CHOLEST SERPL-MCNC: 180 MG/DL
CHOLEST/HDLC SERPL: 2.4 RATIO
CO2 SERPL-SCNC: 27 MMOL/L
COLOR: COLORLESS
CREAT SERPL-MCNC: 1.09 MG/DL
CRP SERPL HS-MCNC: 1.68 MG/L
EOSINOPHIL # BLD AUTO: 0.15 K/UL
EOSINOPHIL NFR BLD AUTO: 2.7 %
ERYTHROCYTE [SEDIMENTATION RATE] IN BLOOD BY WESTERGREN METHOD: 36 MM/HR
ESTIMATED AVERAGE GLUCOSE: 111 MG/DL
FERRITIN SERPL-MCNC: 54 NG/ML
FOLATE SERPL-MCNC: >20 NG/ML
GLUCOSE QUALITATIVE U: NEGATIVE
GLUCOSE SERPL-MCNC: 92 MG/DL
GLUCOSE SERPL-MCNC: 99 MG/DL
HBA1C MFR BLD HPLC: 5.5 %
HCT VFR BLD CALC: 40.3 %
HDLC SERPL-MCNC: 76 MG/DL
HGB BLD-MCNC: 12.7 G/DL
IMM GRANULOCYTES NFR BLD AUTO: 0.4 %
KETONES URINE: NEGATIVE
LDLC SERPL CALC-MCNC: 79 MG/DL
LEUKOCYTE ESTERASE URINE: ABNORMAL
LYMPHOCYTES # BLD AUTO: 1.15 K/UL
LYMPHOCYTES NFR BLD AUTO: 21.1 %
MAGNESIUM SERPL-MCNC: 2.3 MG/DL
MAN DIFF?: NORMAL
MCHC RBC-ENTMCNC: 29.3 PG
MCHC RBC-ENTMCNC: 31.5 GM/DL
MCV RBC AUTO: 93.1 FL
MONOCYTES # BLD AUTO: 0.54 K/UL
MONOCYTES NFR BLD AUTO: 9.9 %
NEUTROPHILS # BLD AUTO: 3.54 K/UL
NEUTROPHILS NFR BLD AUTO: 64.8 %
NITRITE URINE: NEGATIVE
PH URINE: 6.5
PLATELET # BLD AUTO: 236 K/UL
POTASSIUM SERPL-SCNC: 4.1 MMOL/L
PROT SERPL-MCNC: 6.2 G/DL
PROTEIN URINE: NEGATIVE
RBC # BLD: 4.33 M/UL
RBC # FLD: 13.2 %
SODIUM SERPL-SCNC: 141 MMOL/L
SPECIFIC GRAVITY URINE: 1.01
TRIGL SERPL-MCNC: 124 MG/DL
TSH SERPL-ACNC: 2.42 UIU/ML
UROBILINOGEN URINE: NORMAL
VIT B12 SERPL-MCNC: 962 PG/ML
WBC # FLD AUTO: 5.46 K/UL

## 2020-03-11 LAB
CREAT SPEC-SCNC: 22 MG/DL
MICROALBUMIN 24H UR DL<=1MG/L-MCNC: <1.2 MG/DL
MICROALBUMIN/CREAT 24H UR-RTO: NORMAL MG/G

## 2020-08-17 ENCOUNTER — LABORATORY RESULT (OUTPATIENT)
Age: 85
End: 2020-08-17

## 2020-08-17 ENCOUNTER — APPOINTMENT (OUTPATIENT)
Dept: INTERNAL MEDICINE | Facility: CLINIC | Age: 85
End: 2020-08-17
Payer: MEDICARE

## 2020-08-17 VITALS
SYSTOLIC BLOOD PRESSURE: 148 MMHG | WEIGHT: 149 LBS | RESPIRATION RATE: 12 BRPM | TEMPERATURE: 96.9 F | OXYGEN SATURATION: 97 % | HEART RATE: 60 BPM | BODY MASS INDEX: 26.4 KG/M2 | HEIGHT: 63 IN | DIASTOLIC BLOOD PRESSURE: 82 MMHG

## 2020-08-17 VITALS — SYSTOLIC BLOOD PRESSURE: 125 MMHG | DIASTOLIC BLOOD PRESSURE: 80 MMHG

## 2020-08-17 DIAGNOSIS — M19.90 UNSPECIFIED OSTEOARTHRITIS, UNSPECIFIED SITE: ICD-10-CM

## 2020-08-17 PROCEDURE — 99214 OFFICE O/P EST MOD 30 MIN: CPT

## 2020-08-17 RX ORDER — MAGNESIUM OXIDE TAB 400 MG (241.3 MG ELEMENTAL MG) 400 (241.3 MG) MG
400 (241.3 MG) TAB ORAL
Qty: 60 | Refills: 0 | Status: DISCONTINUED | COMMUNITY
Start: 2020-03-06

## 2020-08-17 NOTE — PHYSICAL EXAM
[Well Nourished] : well nourished [No Acute Distress] : no acute distress [Well Developed] : well developed [Well-Appearing] : well-appearing [EOMI] : extraocular movements intact [PERRL] : pupils equal round and reactive to light [Normal Sclera/Conjunctiva] : normal sclera/conjunctiva [Normal Oropharynx] : the oropharynx was normal [No JVD] : no jugular venous distention [Normal Outer Ear/Nose] : the outer ears and nose were normal in appearance [Supple] : supple [No Lymphadenopathy] : no lymphadenopathy [No Respiratory Distress] : no respiratory distress  [No Accessory Muscle Use] : no accessory muscle use [Thyroid Normal, No Nodules] : the thyroid was normal and there were no nodules present [Clear to Auscultation] : lungs were clear to auscultation bilaterally [Normal Rate] : normal rate  [Regular Rhythm] : with a regular rhythm [No Murmur] : no murmur heard [Normal S1, S2] : normal S1 and S2 [No Varicosities] : no varicosities [No Abdominal Bruit] : a ~M bruit was not heard ~T in the abdomen [No Carotid Bruits] : no carotid bruits [No Edema] : there was no peripheral edema [No Palpable Aorta] : no palpable aorta [Pedal Pulses Present] : the pedal pulses are present [Soft] : abdomen soft [Non Tender] : non-tender [No Extremity Clubbing/Cyanosis] : no extremity clubbing/cyanosis [Non-distended] : non-distended [No Masses] : no abdominal mass palpated [Normal Posterior Cervical Nodes] : no posterior cervical lymphadenopathy [No HSM] : no HSM [Normal Bowel Sounds] : normal bowel sounds [Normal Anterior Cervical Nodes] : no anterior cervical lymphadenopathy [No CVA Tenderness] : no CVA  tenderness [No Spinal Tenderness] : no spinal tenderness [Coordination Grossly Intact] : coordination grossly intact [No Joint Swelling] : no joint swelling [Grossly Normal Strength/Tone] : grossly normal strength/tone [Normal Gait] : normal gait [No Focal Deficits] : no focal deficits [Deep Tendon Reflexes (DTR)] : deep tendon reflexes were 2+ and symmetric [Normal Insight/Judgement] : insight and judgment were intact [Normal Affect] : the affect was normal

## 2020-08-17 NOTE — REVIEW OF SYSTEMS
[Constipation] : constipation [Nocturia] : nocturia [Joint Pain] : joint pain [Joint Stiffness] : joint stiffness [Depression] : depression [Anxiety] : anxiety [Negative] : Heme/Lymph [Abdominal Pain] : no abdominal pain [Nausea] : no nausea [Vomiting] : no vomiting [Heartburn] : no heartburn [Melena] : no melena [Dysuria] : no dysuria [Incontinence] : no incontinence [Hematuria] : no hematuria [Frequency] : no frequency [Vaginal Discharge] : no vaginal discharge [Joint Swelling] : no joint swelling [Muscle Pain] : no muscle pain [Suicidal] : not suicidal [Back Pain] : no back pain [Insomnia] : no insomnia [FreeTextEntry8] : EVERY 10 MINUTES [FreeTextEntry9] : LEFT WRIST

## 2020-08-18 LAB
25(OH)D3 SERPL-MCNC: 64.2 NG/ML
ALBUMIN SERPL ELPH-MCNC: 4.6 G/DL
ALP BLD-CCNC: 88 U/L
ALT SERPL-CCNC: 15 U/L
ANION GAP SERPL CALC-SCNC: 13 MMOL/L
APPEARANCE: CLEAR
AST SERPL-CCNC: 23 U/L
BASOPHILS # BLD AUTO: 0.05 K/UL
BASOPHILS NFR BLD AUTO: 0.8 %
BILIRUB SERPL-MCNC: 0.6 MG/DL
BILIRUBIN URINE: NEGATIVE
BLOOD URINE: NEGATIVE
BUN SERPL-MCNC: 22 MG/DL
CALCIUM SERPL-MCNC: 9.6 MG/DL
CHLORIDE SERPL-SCNC: 99 MMOL/L
CHOLEST SERPL-MCNC: 186 MG/DL
CHOLEST/HDLC SERPL: 2.6 RATIO
CO2 SERPL-SCNC: 27 MMOL/L
COLOR: NORMAL
CREAT SERPL-MCNC: 1.14 MG/DL
CREAT SPEC-SCNC: 41 MG/DL
EOSINOPHIL # BLD AUTO: 0.25 K/UL
EOSINOPHIL NFR BLD AUTO: 4.1 %
ESTIMATED AVERAGE GLUCOSE: 114 MG/DL
FERRITIN SERPL-MCNC: 88 NG/ML
FOLATE SERPL-MCNC: >20 NG/ML
GLUCOSE QUALITATIVE U: NEGATIVE
GLUCOSE SERPL-MCNC: 101 MG/DL
GLUCOSE SERPL-MCNC: 94 MG/DL
HBA1C MFR BLD HPLC: 5.6 %
HCT VFR BLD CALC: 41.8 %
HDLC SERPL-MCNC: 72 MG/DL
HGB BLD-MCNC: 13 G/DL
IMM GRANULOCYTES NFR BLD AUTO: 0.2 %
KETONES URINE: NEGATIVE
LDLC SERPL CALC-MCNC: 97 MG/DL
LEUKOCYTE ESTERASE URINE: ABNORMAL
LYMPHOCYTES # BLD AUTO: 1.29 K/UL
LYMPHOCYTES NFR BLD AUTO: 21.3 %
MAGNESIUM SERPL-MCNC: 2.4 MG/DL
MAN DIFF?: NORMAL
MCHC RBC-ENTMCNC: 29.6 PG
MCHC RBC-ENTMCNC: 31.1 GM/DL
MCV RBC AUTO: 95.2 FL
MICROALBUMIN 24H UR DL<=1MG/L-MCNC: <1.2 MG/DL
MICROALBUMIN/CREAT 24H UR-RTO: NORMAL MG/G
MONOCYTES # BLD AUTO: 0.56 K/UL
MONOCYTES NFR BLD AUTO: 9.2 %
NEUTROPHILS # BLD AUTO: 3.91 K/UL
NEUTROPHILS NFR BLD AUTO: 64.4 %
NITRITE URINE: NEGATIVE
PH URINE: 6.5
PLATELET # BLD AUTO: 256 K/UL
POTASSIUM SERPL-SCNC: 3.9 MMOL/L
PROT SERPL-MCNC: 6.3 G/DL
PROTEIN URINE: NEGATIVE
RBC # BLD: 4.39 M/UL
RBC # FLD: 13.2 %
SARS-COV-2 IGG SERPL IA-ACNC: 0.03 INDEX
SARS-COV-2 IGG SERPL QL IA: NEGATIVE
SODIUM SERPL-SCNC: 139 MMOL/L
SPECIFIC GRAVITY URINE: 1.01
TRIGL SERPL-MCNC: 86 MG/DL
TSH SERPL-ACNC: 2.3 UIU/ML
UROBILINOGEN URINE: NORMAL
VIT B12 SERPL-MCNC: 994 PG/ML
WBC # FLD AUTO: 6.07 K/UL

## 2021-02-18 ENCOUNTER — RX RENEWAL (OUTPATIENT)
Age: 86
End: 2021-02-18

## 2021-04-01 ENCOUNTER — APPOINTMENT (OUTPATIENT)
Dept: INTERNAL MEDICINE | Facility: CLINIC | Age: 86
End: 2021-04-01
Payer: MEDICARE

## 2021-04-01 VITALS
SYSTOLIC BLOOD PRESSURE: 124 MMHG | RESPIRATION RATE: 12 BRPM | TEMPERATURE: 96.8 F | OXYGEN SATURATION: 93 % | DIASTOLIC BLOOD PRESSURE: 81 MMHG | WEIGHT: 142 LBS | HEIGHT: 63 IN | BODY MASS INDEX: 25.16 KG/M2 | HEART RATE: 66 BPM

## 2021-04-01 PROCEDURE — 99214 OFFICE O/P EST MOD 30 MIN: CPT

## 2021-04-01 RX ORDER — FAMOTIDINE 20 MG/1
20 TABLET, FILM COATED ORAL
Qty: 90 | Refills: 0 | Status: DISCONTINUED | COMMUNITY
Start: 2019-03-04 | End: 2021-04-01

## 2021-04-01 RX ORDER — CALCIUM CARBONATE/VITAMIN D3 500-10/5ML
400 LIQUID (ML) ORAL
Qty: 60 | Refills: 3 | Status: DISCONTINUED | COMMUNITY
Start: 2020-03-06 | End: 2021-04-01

## 2021-04-01 NOTE — PHYSICAL EXAM
[Well Nourished] : well nourished [Well Developed] : well developed [Well-Appearing] : well-appearing [Normal Sclera/Conjunctiva] : normal sclera/conjunctiva [PERRL] : pupils equal round and reactive to light [EOMI] : extraocular movements intact [Normal Outer Ear/Nose] : the outer ears and nose were normal in appearance [Normal Oropharynx] : the oropharynx was normal [No JVD] : no jugular venous distention [No Lymphadenopathy] : no lymphadenopathy [Supple] : supple [Thyroid Normal, No Nodules] : the thyroid was normal and there were no nodules present [No Respiratory Distress] : no respiratory distress  [No Accessory Muscle Use] : no accessory muscle use [Clear to Auscultation] : lungs were clear to auscultation bilaterally [Normal Rate] : normal rate  [Regular Rhythm] : with a regular rhythm [Normal S1, S2] : normal S1 and S2 [No Murmur] : no murmur heard [No Carotid Bruits] : no carotid bruits [No Abdominal Bruit] : a ~M bruit was not heard ~T in the abdomen [No Varicosities] : no varicosities [Pedal Pulses Present] : the pedal pulses are present [No Edema] : there was no peripheral edema [No Palpable Aorta] : no palpable aorta [No Extremity Clubbing/Cyanosis] : no extremity clubbing/cyanosis [Soft] : abdomen soft [Non Tender] : non-tender [Non-distended] : non-distended [No Masses] : no abdominal mass palpated [No HSM] : no HSM [Normal Bowel Sounds] : normal bowel sounds [Normal Posterior Cervical Nodes] : no posterior cervical lymphadenopathy [Normal Anterior Cervical Nodes] : no anterior cervical lymphadenopathy [No CVA Tenderness] : no CVA  tenderness [No Spinal Tenderness] : no spinal tenderness [No Joint Swelling] : no joint swelling [Grossly Normal Strength/Tone] : grossly normal strength/tone [Coordination Grossly Intact] : coordination grossly intact [No Focal Deficits] : no focal deficits [Normal Gait] : normal gait [Deep Tendon Reflexes (DTR)] : deep tendon reflexes were 2+ and symmetric [Normal Affect] : the affect was normal [Normal Insight/Judgement] : insight and judgment were intact [de-identified] : RT CANAL WITH WAX

## 2021-04-01 NOTE — REVIEW OF SYSTEMS
[Constipation] : constipation [Nocturia] : nocturia [Joint Pain] : joint pain [Joint Stiffness] : joint stiffness [Depression] : depression [Anxiety] : anxiety [Negative] : Heme/Lymph [Abdominal Pain] : no abdominal pain [Nausea] : no nausea [Vomiting] : no vomiting [Heartburn] : no heartburn [Melena] : no melena [Dysuria] : no dysuria [Incontinence] : no incontinence [Hematuria] : no hematuria [Frequency] : no frequency [Vaginal Discharge] : no vaginal discharge [Joint Swelling] : no joint swelling [Muscle Pain] : no muscle pain [Back Pain] : no back pain [Suicidal] : not suicidal [Insomnia] : no insomnia [FreeTextEntry2] : SEE HPI [FreeTextEntry8] : EVERY 10 MINUTES [FreeTextEntry9] : LEFT WRIST

## 2021-04-02 LAB
25(OH)D3 SERPL-MCNC: 69.7 NG/ML
ALBUMIN SERPL ELPH-MCNC: 4.6 G/DL
ALP BLD-CCNC: 91 U/L
ALT SERPL-CCNC: 12 U/L
ANION GAP SERPL CALC-SCNC: 16 MMOL/L
APPEARANCE: CLEAR
AST SERPL-CCNC: 26 U/L
BILIRUB SERPL-MCNC: 0.9 MG/DL
BILIRUBIN URINE: NEGATIVE
BLOOD URINE: NEGATIVE
BUN SERPL-MCNC: 24 MG/DL
CALCIUM SERPL-MCNC: 10.1 MG/DL
CHLORIDE SERPL-SCNC: 99 MMOL/L
CHOLEST SERPL-MCNC: 193 MG/DL
CO2 SERPL-SCNC: 26 MMOL/L
COLOR: NORMAL
CREAT SERPL-MCNC: 1.13 MG/DL
CREAT SPEC-SCNC: 29 MG/DL
ESTIMATED AVERAGE GLUCOSE: 111 MG/DL
FERRITIN SERPL-MCNC: 104 NG/ML
FOLATE SERPL-MCNC: >20 NG/ML
GLUCOSE QUALITATIVE U: NEGATIVE
GLUCOSE SERPL-MCNC: 93 MG/DL
GLUCOSE SERPL-MCNC: 99 MG/DL
HBA1C MFR BLD HPLC: 5.5 %
HDLC SERPL-MCNC: 73 MG/DL
KETONES URINE: NEGATIVE
LDLC SERPL CALC-MCNC: 99 MG/DL
LEUKOCYTE ESTERASE URINE: NEGATIVE
MAGNESIUM SERPL-MCNC: 2.3 MG/DL
MICROALBUMIN 24H UR DL<=1MG/L-MCNC: <1.2 MG/DL
MICROALBUMIN/CREAT 24H UR-RTO: NORMAL MG/G
NITRITE URINE: NEGATIVE
NONHDLC SERPL-MCNC: 120 MG/DL
PH URINE: 6.5
POTASSIUM SERPL-SCNC: 3.9 MMOL/L
PROT SERPL-MCNC: 6.5 G/DL
PROTEIN URINE: NEGATIVE
SODIUM SERPL-SCNC: 140 MMOL/L
SPECIFIC GRAVITY URINE: 1.01
TRIGL SERPL-MCNC: 107 MG/DL
TSH SERPL-ACNC: 2.03 UIU/ML
UROBILINOGEN URINE: NORMAL
VIT B12 SERPL-MCNC: 923 PG/ML

## 2021-04-09 LAB
BASOPHILS # BLD AUTO: 0.06 K/UL
BASOPHILS NFR BLD AUTO: 1 %
EOSINOPHIL # BLD AUTO: 0.22 K/UL
EOSINOPHIL NFR BLD AUTO: 3.6 %
HCT VFR BLD CALC: 42 %
HGB BLD-MCNC: 13.4 G/DL
IMM GRANULOCYTES NFR BLD AUTO: 0.3 %
LYMPHOCYTES # BLD AUTO: 1.43 K/UL
LYMPHOCYTES NFR BLD AUTO: 23.1 %
MAN DIFF?: NORMAL
MCHC RBC-ENTMCNC: 30.6 PG
MCHC RBC-ENTMCNC: 31.9 GM/DL
MCV RBC AUTO: 95.9 FL
MONOCYTES # BLD AUTO: 0.57 K/UL
MONOCYTES NFR BLD AUTO: 9.2 %
NEUTROPHILS # BLD AUTO: 3.89 K/UL
NEUTROPHILS NFR BLD AUTO: 62.8 %
PLATELET # BLD AUTO: 269 K/UL
RBC # BLD: 4.38 M/UL
RBC # FLD: 13.1 %
WBC # FLD AUTO: 6.19 K/UL

## 2021-04-12 ENCOUNTER — INPATIENT (INPATIENT)
Facility: HOSPITAL | Age: 86
LOS: 6 days | Discharge: NURSING, REHAB OR LONG-TERM CARE | DRG: 481 | End: 2021-04-19
Attending: ORTHOPAEDIC SURGERY | Admitting: ORTHOPAEDIC SURGERY
Payer: MEDICARE

## 2021-04-12 ENCOUNTER — TRANSCRIPTION ENCOUNTER (OUTPATIENT)
Age: 86
End: 2021-04-12

## 2021-04-12 VITALS
HEART RATE: 61 BPM | DIASTOLIC BLOOD PRESSURE: 54 MMHG | SYSTOLIC BLOOD PRESSURE: 109 MMHG | OXYGEN SATURATION: 100 % | RESPIRATION RATE: 16 BRPM | TEMPERATURE: 97 F

## 2021-04-12 DIAGNOSIS — S72.92XA UNSPECIFIED FRACTURE OF LEFT FEMUR, INITIAL ENCOUNTER FOR CLOSED FRACTURE: ICD-10-CM

## 2021-04-12 LAB
ALBUMIN SERPL ELPH-MCNC: 3.7 G/DL — SIGNIFICANT CHANGE UP (ref 3.3–5)
ALP SERPL-CCNC: 74 U/L — SIGNIFICANT CHANGE UP (ref 40–120)
ALT FLD-CCNC: 18 U/L — SIGNIFICANT CHANGE UP (ref 10–45)
ANION GAP SERPL CALC-SCNC: 13 MMOL/L — SIGNIFICANT CHANGE UP (ref 5–17)
APTT BLD: 23.5 SEC — LOW (ref 27.5–35.5)
AST SERPL-CCNC: 53 U/L — HIGH (ref 10–40)
BASOPHILS # BLD AUTO: 0.08 K/UL — SIGNIFICANT CHANGE UP (ref 0–0.2)
BASOPHILS NFR BLD AUTO: 0.9 % — SIGNIFICANT CHANGE UP (ref 0–2)
BILIRUB SERPL-MCNC: 0.5 MG/DL — SIGNIFICANT CHANGE UP (ref 0.2–1.2)
BLD GP AB SCN SERPL QL: NEGATIVE — SIGNIFICANT CHANGE UP
BUN SERPL-MCNC: 21 MG/DL — SIGNIFICANT CHANGE UP (ref 7–23)
CALCIUM SERPL-MCNC: 8.8 MG/DL — SIGNIFICANT CHANGE UP (ref 8.4–10.5)
CHLORIDE SERPL-SCNC: 101 MMOL/L — SIGNIFICANT CHANGE UP (ref 96–108)
CO2 SERPL-SCNC: 21 MMOL/L — LOW (ref 22–31)
CREAT SERPL-MCNC: 1 MG/DL — SIGNIFICANT CHANGE UP (ref 0.5–1.3)
EOSINOPHIL # BLD AUTO: 0.17 K/UL — SIGNIFICANT CHANGE UP (ref 0–0.5)
EOSINOPHIL NFR BLD AUTO: 1.8 % — SIGNIFICANT CHANGE UP (ref 0–6)
GLUCOSE SERPL-MCNC: 146 MG/DL — HIGH (ref 70–99)
HCT VFR BLD CALC: 38 % — SIGNIFICANT CHANGE UP (ref 34.5–45)
HGB BLD-MCNC: 12.2 G/DL — SIGNIFICANT CHANGE UP (ref 11.5–15.5)
IMM GRANULOCYTES NFR BLD AUTO: 0.6 % — SIGNIFICANT CHANGE UP (ref 0–1.5)
INR BLD: 0.98 RATIO — SIGNIFICANT CHANGE UP (ref 0.88–1.16)
LYMPHOCYTES # BLD AUTO: 1.71 K/UL — SIGNIFICANT CHANGE UP (ref 1–3.3)
LYMPHOCYTES # BLD AUTO: 18.2 % — SIGNIFICANT CHANGE UP (ref 13–44)
MCHC RBC-ENTMCNC: 29.9 PG — SIGNIFICANT CHANGE UP (ref 27–34)
MCHC RBC-ENTMCNC: 32.1 GM/DL — SIGNIFICANT CHANGE UP (ref 32–36)
MCV RBC AUTO: 93.1 FL — SIGNIFICANT CHANGE UP (ref 80–100)
MONOCYTES # BLD AUTO: 0.74 K/UL — SIGNIFICANT CHANGE UP (ref 0–0.9)
MONOCYTES NFR BLD AUTO: 7.9 % — SIGNIFICANT CHANGE UP (ref 2–14)
NEUTROPHILS # BLD AUTO: 6.62 K/UL — SIGNIFICANT CHANGE UP (ref 1.8–7.4)
NEUTROPHILS NFR BLD AUTO: 70.6 % — SIGNIFICANT CHANGE UP (ref 43–77)
NRBC # BLD: 0 /100 WBCS — SIGNIFICANT CHANGE UP (ref 0–0)
PLATELET # BLD AUTO: 266 K/UL — SIGNIFICANT CHANGE UP (ref 150–400)
POTASSIUM SERPL-MCNC: 5.8 MMOL/L — HIGH (ref 3.5–5.3)
POTASSIUM SERPL-SCNC: 5.8 MMOL/L — HIGH (ref 3.5–5.3)
PROT SERPL-MCNC: 6.4 G/DL — SIGNIFICANT CHANGE UP (ref 6–8.3)
PROTHROM AB SERPL-ACNC: 11.8 SEC — SIGNIFICANT CHANGE UP (ref 10.6–13.6)
RBC # BLD: 4.08 M/UL — SIGNIFICANT CHANGE UP (ref 3.8–5.2)
RBC # FLD: 13.1 % — SIGNIFICANT CHANGE UP (ref 10.3–14.5)
RH IG SCN BLD-IMP: POSITIVE — SIGNIFICANT CHANGE UP
RH IG SCN BLD-IMP: POSITIVE — SIGNIFICANT CHANGE UP
SARS-COV-2 RNA SPEC QL NAA+PROBE: SIGNIFICANT CHANGE UP
SODIUM SERPL-SCNC: 135 MMOL/L — SIGNIFICANT CHANGE UP (ref 135–145)
WBC # BLD: 9.38 K/UL — SIGNIFICANT CHANGE UP (ref 3.8–10.5)
WBC # FLD AUTO: 9.38 K/UL — SIGNIFICANT CHANGE UP (ref 3.8–10.5)

## 2021-04-12 PROCEDURE — 73552 X-RAY EXAM OF FEMUR 2/>: CPT | Mod: 26,LT

## 2021-04-12 PROCEDURE — 72170 X-RAY EXAM OF PELVIS: CPT | Mod: 26

## 2021-04-12 PROCEDURE — 73700 CT LOWER EXTREMITY W/O DYE: CPT | Mod: 26,LT,MA

## 2021-04-12 PROCEDURE — 73560 X-RAY EXAM OF KNEE 1 OR 2: CPT | Mod: 26,LT,59

## 2021-04-12 PROCEDURE — 99285 EMERGENCY DEPT VISIT HI MDM: CPT | Mod: CS

## 2021-04-12 PROCEDURE — 76377 3D RENDER W/INTRP POSTPROCES: CPT | Mod: 26

## 2021-04-12 PROCEDURE — 73562 X-RAY EXAM OF KNEE 3: CPT | Mod: 26,LT

## 2021-04-12 PROCEDURE — 70450 CT HEAD/BRAIN W/O DYE: CPT | Mod: 26,MA

## 2021-04-12 PROCEDURE — 93010 ELECTROCARDIOGRAM REPORT: CPT

## 2021-04-12 PROCEDURE — 71045 X-RAY EXAM CHEST 1 VIEW: CPT | Mod: 26

## 2021-04-12 RX ORDER — ACETAMINOPHEN 500 MG
975 TABLET ORAL ONCE
Refills: 0 | Status: COMPLETED | OUTPATIENT
Start: 2021-04-12 | End: 2021-04-12

## 2021-04-12 RX ORDER — METOPROLOL TARTRATE 50 MG
50 TABLET ORAL DAILY
Refills: 0 | Status: DISCONTINUED | OUTPATIENT
Start: 2021-04-12 | End: 2021-04-13

## 2021-04-12 RX ORDER — FAMOTIDINE 10 MG/ML
20 INJECTION INTRAVENOUS DAILY
Refills: 0 | Status: DISCONTINUED | OUTPATIENT
Start: 2021-04-12 | End: 2021-04-13

## 2021-04-12 RX ORDER — CLOPIDOGREL BISULFATE 75 MG/1
75 TABLET, FILM COATED ORAL DAILY
Refills: 0 | Status: DISCONTINUED | OUTPATIENT
Start: 2021-04-12 | End: 2021-04-12

## 2021-04-12 RX ORDER — SODIUM CHLORIDE 9 MG/ML
1000 INJECTION INTRAMUSCULAR; INTRAVENOUS; SUBCUTANEOUS
Refills: 0 | Status: DISCONTINUED | OUTPATIENT
Start: 2021-04-12 | End: 2021-04-13

## 2021-04-12 RX ORDER — VANCOMYCIN HCL 1 G
1000 VIAL (EA) INTRAVENOUS ONCE
Refills: 0 | Status: DISCONTINUED | OUTPATIENT
Start: 2021-04-12 | End: 2021-04-12

## 2021-04-12 RX ORDER — SODIUM ZIRCONIUM CYCLOSILICATE 10 G/10G
5 POWDER, FOR SUSPENSION ORAL ONCE
Refills: 0 | Status: DISCONTINUED | OUTPATIENT
Start: 2021-04-12 | End: 2021-04-12

## 2021-04-12 RX ORDER — HYDROCHLOROTHIAZIDE 25 MG
12.5 TABLET ORAL DAILY
Refills: 0 | Status: DISCONTINUED | OUTPATIENT
Start: 2021-04-12 | End: 2021-04-13

## 2021-04-12 RX ORDER — ONDANSETRON 8 MG/1
4 TABLET, FILM COATED ORAL ONCE
Refills: 0 | Status: COMPLETED | OUTPATIENT
Start: 2021-04-12 | End: 2021-04-12

## 2021-04-12 RX ORDER — OXYCODONE HYDROCHLORIDE 5 MG/1
5 TABLET ORAL EVERY 4 HOURS
Refills: 0 | Status: DISCONTINUED | OUTPATIENT
Start: 2021-04-12 | End: 2021-04-13

## 2021-04-12 RX ORDER — SENNA PLUS 8.6 MG/1
2 TABLET ORAL AT BEDTIME
Refills: 0 | Status: DISCONTINUED | OUTPATIENT
Start: 2021-04-12 | End: 2021-04-13

## 2021-04-12 RX ORDER — HYDROMORPHONE HYDROCHLORIDE 2 MG/ML
0.5 INJECTION INTRAMUSCULAR; INTRAVENOUS; SUBCUTANEOUS EVERY 6 HOURS
Refills: 0 | Status: DISCONTINUED | OUTPATIENT
Start: 2021-04-12 | End: 2021-04-13

## 2021-04-12 RX ORDER — OXYCODONE HYDROCHLORIDE 5 MG/1
10 TABLET ORAL EVERY 4 HOURS
Refills: 0 | Status: DISCONTINUED | OUTPATIENT
Start: 2021-04-12 | End: 2021-04-13

## 2021-04-12 RX ORDER — SODIUM CHLORIDE 9 MG/ML
1000 INJECTION, SOLUTION INTRAVENOUS
Refills: 0 | Status: DISCONTINUED | OUTPATIENT
Start: 2021-04-12 | End: 2021-04-12

## 2021-04-12 RX ORDER — ATORVASTATIN CALCIUM 80 MG/1
20 TABLET, FILM COATED ORAL AT BEDTIME
Refills: 0 | Status: DISCONTINUED | OUTPATIENT
Start: 2021-04-12 | End: 2021-04-13

## 2021-04-12 RX ORDER — HEPARIN SODIUM 5000 [USP'U]/ML
5000 INJECTION INTRAVENOUS; SUBCUTANEOUS EVERY 8 HOURS
Refills: 0 | Status: COMPLETED | OUTPATIENT
Start: 2021-04-12 | End: 2021-04-12

## 2021-04-12 RX ORDER — MAGNESIUM HYDROXIDE 400 MG/1
30 TABLET, CHEWABLE ORAL DAILY
Refills: 0 | Status: DISCONTINUED | OUTPATIENT
Start: 2021-04-12 | End: 2021-04-13

## 2021-04-12 RX ORDER — MORPHINE SULFATE 50 MG/1
2 CAPSULE, EXTENDED RELEASE ORAL ONCE
Refills: 0 | Status: DISCONTINUED | OUTPATIENT
Start: 2021-04-12 | End: 2021-04-12

## 2021-04-12 RX ADMIN — HEPARIN SODIUM 5000 UNIT(S): 5000 INJECTION INTRAVENOUS; SUBCUTANEOUS at 23:34

## 2021-04-12 RX ADMIN — MORPHINE SULFATE 2 MILLIGRAM(S): 50 CAPSULE, EXTENDED RELEASE ORAL at 18:10

## 2021-04-12 RX ADMIN — ATORVASTATIN CALCIUM 20 MILLIGRAM(S): 80 TABLET, FILM COATED ORAL at 23:34

## 2021-04-12 RX ADMIN — ONDANSETRON 4 MILLIGRAM(S): 8 TABLET, FILM COATED ORAL at 18:16

## 2021-04-12 RX ADMIN — SODIUM CHLORIDE 75 MILLILITER(S): 9 INJECTION, SOLUTION INTRAVENOUS at 21:25

## 2021-04-12 RX ADMIN — MORPHINE SULFATE 2 MILLIGRAM(S): 50 CAPSULE, EXTENDED RELEASE ORAL at 16:28

## 2021-04-12 RX ADMIN — Medication 975 MILLIGRAM(S): at 16:28

## 2021-04-12 RX ADMIN — Medication 975 MILLIGRAM(S): at 18:10

## 2021-04-12 NOTE — H&P ADULT - NSHPLABSRESULTS_GEN_ALL_CORE
XR L Knee and CT images demonstrating comminuted L distal femur fracture proximal to the femoral component of a previous TKA.

## 2021-04-12 NOTE — ED PROVIDER NOTE - OBJECTIVE STATEMENT
89 y/o F pmhx htn, hld, CAD on plavix remote history of b/l TKR presenting with severe L knee pain s/p fall. Patient reports that she lives by herself at home and was making her bed when she tripped over something, likely the bed sheets and fell. She states that she didn't lose consciousness but it happened so fast she doesn't remember everything that happened and isn't sure if she hit her head or not. She reports that she was able to move herself on the floor over to the phone to call her daughter to come help her. Was not able to get up on her own and daughter came to get her, noted that she was not able to get up and called EMS. Reported that her knee became very swollen and bruised quickly.

## 2021-04-12 NOTE — H&P ADULT - ASSESSMENT
A/P: 89F w/ Left periprosthetic distal femur fracture    Plan:    -Plan for surgical intervention for L distal femur fracture with ORIF vs IMN w/ Dr Mosquera, will book and begin preop.  -Preop labs/imaging: CBC/BMP/PT/PTT/INR/T&S/Covid/CXR/EKG.  -NPO after midnight/IVFs while NPO.  -NWB LLE in [facility]  -Please document medical clearance  -DVT ppx: hold chem DVT ppx after midnight.   -Pain control prn  -Medical management, continue home meds.  -Case discussed with attending, will advise if plan changes. A/P: 89F w/ Left periprosthetic distal femur fracture    Plan:    -Plan for surgical intervention for L distal femur fracture with ORIF vs IMN w/ Dr Mosquera, will book and begin preop.  -Placed in [facility] and post reduction xrays obtained.   -Preop labs/imaging: CBC/BMP/PT/PTT/INR/T&S/Covid/CXR/EKG.  -NPO after midnight/IVFs while NPO.  -NWB LLE in [facility]  -Please document medical clearance  -DVT ppx: hold chem DVT ppx after midnight.   -Pain control prn  -Medical management, continue home meds.  -Case discussed with attending, will advise if plan changes.

## 2021-04-12 NOTE — H&P ADULT - HISTORY OF PRESENT ILLNESS
Patient is a 89yFemale home ambulator with assistive devices (walker) who presents to [facility] ED w/ a c/o of left leg pain after a MF while at home. Patient states she was changing the bed sheets at home when she believes that she tripped on the leg of the bed and fell to the floor, denies preceding CP/SOB/palpitations/N/v/Headache/confusion/dizziness/weakness/fatigue. Unsure of Head trauma/LOC. States inability to walk immediately following the injury. Denies any numbness or tingling. Denies having any other significant pain elsewhere. Has TKA on the left but is unable to provide history and unaware who performed procedure. No other orthopedic concerns at this time.    PMH:    Hypertension    Hyperlipidemia    Coronary stent occlusion, initial encounter      penicillin (Unknown)

## 2021-04-12 NOTE — ED PROVIDER NOTE - NS ED ROS FT
Constitutional: No fever or chills  Eyes: No visual changes, eye pain or redness  HEENT: No throat pain, ear pain, nasal pain. No nose bleeding.  CV: No chest pain or lower extremity edema  Resp: No SOB no cough  GI: No abd pain. No nausea or vomiting. No diarrhea. No constipation.   : No dysuria, hematuria.   MSK: R knee pain  Skin: No rash  Neuro: No headache. No numbness or tingling. No weakness.

## 2021-04-12 NOTE — ED PROVIDER NOTE - ATTENDING CONTRIBUTION TO CARE
Huan Orourke MD, FACEP: In this physician's medical judgement based on clinical history and physical exam, left knee deformed with ecchymosis and severe pain intact distal perfusion and sensory, likely knee or femur fracture.   See MDM above.   Patient endorsed to the orthopedic team at the time of admission. Based on patient's history and physical exam, as well as the results of today's workup, I feel that patient warrants admission to the hospital for further workup/evaluation and continued management. I discussed the findings of today's workup with the patient and addressed the patient's questions and concerns. The patient was agreeable with admission. Our team spoke with the inpatient receiving team who accepted the patient for admission and subsequently took over the patient's care at the time of admission. The receiving team will follow up on pending labs, analgesia, any clinical imaging results, ancillary findings, reassess, and disposition as clinically indicated. Details of patient and plan conveyed to receiving physician team and conveyed back for understanding. There were no questions at this time about the patient's status, disposition, and plan. Patient's care to be taken over by receiving physician team at this time, all decisions regarding the progression of care will be made at their discretion.

## 2021-04-12 NOTE — ED PROVIDER NOTE - PHYSICAL EXAMINATION
GEN: Well Appearing, Nontoxic, NAD  HEENT: NC/AT, Symm Facies. PERRL, EOMI, MMM, posterior pharynx clear  CV: No JVD/Bruits or stridor;  +S1S2, RRR w/o m/g/r  RESP: CTAB w/o w/r/r  ABD: Soft, nt/nd, +BS. No guarding/rebound. No RUQ tender, no CVAT  EXT/MSK: No lower extremity edema or calf tenderness. WWP, palpable pulses. L knee significantly edematous with ecchymosis and tenderness.   SKIN: No erythema, lesions or rash  Neuro: Grossly intact, AOX3 with normal speech, CN II-XII intact; Sensation intact. DEcreased strength in LLE secondary to L knee injury and pain

## 2021-04-12 NOTE — H&P ADULT - NSICDXPASTMEDICALHX_GEN_ALL_CORE_FT
PAST MEDICAL HISTORY:  Coronary stent occlusion, initial encounter     Hyperlipidemia     Hypertension

## 2021-04-12 NOTE — ED PROVIDER NOTE - CARE PLAN
Principal Discharge DX:	Femur fracture, left   Principal Discharge DX:	Femur fracture, left  Goal:	Comminuted Left distal femur fracture proximal to a prior total knee replacement, initial encounter.

## 2021-04-12 NOTE — ED ADULT NURSE NOTE - OBJECTIVE STATEMENT
89y/o female aaox4 hx HTN, HLD, b/l knee replacement presents to the ED via EMS from home c/o L knee pain s/p fall , as per pt reports this morning she was " fixing the bed" and sudden she trip and fall , striking against the floor the L side of her body mostly the L knee , unable to walk, finally she manage to crawl to the phone and called her daughter which called EMS , PT PTA received 60 fentanyl and 4 Zofran , upon assessment the L knee is swelling , splitting by EMS , +pedal pulses, +sensation, unable to assess ROM, warm to touch, c/o L knee pain 7/10. At this time pt  Denies fever, chills, n/v, weakness, abd pain, diarrhea/constipation, numbness/tingling, urinary symptoms , in no respiratory distress, no CP, Safety and comfort measures initiated- bed placed in lowest position and side rails raised. Pt oriented to call bell system. 89y/o female aaox4 hx HTN, HLD, x2 cardia stents on Plavix,   b/l knee replacement presents to the ED via EMS from home c/o L knee pain s/p fall , as per pt reports this morning she was " fixing the bed" and sudden she trip and fall , striking against the floor the L side of her body mostly the L knee , unable to walk, finally she manage to crawl to the phone and called her daughter which called EMS , PT PTA received 60 fentanyl and 4 Zofran , upon assessment the L knee is swelling , splitting by EMS , +pedal pulses, +sensation, unable to assess ROM, warm to touch, c/o L knee pain 7/10. At this time pt  Denies fever, chills, n/v, weakness, abd pain, diarrhea/constipation, numbness/tingling, urinary symptoms , in no respiratory distress, no CP, Safety and comfort measures initiated- bed placed in lowest position and side rails raised. Pt oriented to call bell system.

## 2021-04-12 NOTE — ED PROVIDER NOTE - CLINICAL SUMMARY MEDICAL DECISION MAKING FREE TEXT BOX
Huan Orourke MD, FACEP: In this physician's medical judgement based on clinical history and physical exam, patient bibems, parental analgesia given prior to arrival, patient pain controlled and will hold further parental analgesia secondary to pain controlled at this time. Will get iv, cbc, cmp, pt/inr, t&s, ct head, cxr, ekg, xray left knee.  Will follow up on labs, analgesia, imaging, reassess and disposition to the inpatient team as clinically indicated.

## 2021-04-12 NOTE — ED PROVIDER NOTE - PROGRESS NOTE DETAILS
ortho consulted given patient's extensive fracture of distal femur. requesting CT of knee and femur films which are ordered at this time. pain control provided. patient and family updated. -Maria Isabel Ferro PA-C

## 2021-04-12 NOTE — H&P ADULT - NSHPPHYSICALEXAM_GEN_ALL_CORE
PHYSICAL EXAM:  T(C): 36.9 (04-12-21 @ 14:39), Max: 36.9 (04-12-21 @ 14:39)  HR: 70 (04-12-21 @ 18:20) (61 - 70)  BP: 99/58 (04-12-21 @ 18:20) (91/59 - 110/56)  RR: 20 (04-12-21 @ 18:20) (16 - 20)  SpO2: 96% (04-12-21 @ 18:20) (96% - 100%)    Gen: NAD, Resting comfortably    LLE:  Skin intact, no erythema. + ecchymosis and swelling about the knee.  No bony tenderness to palpation  +EHL/FHL/TA/GSC  +SILT L3-S1  + DP  Compartments soft and compressible  No calf tenderness    Secondary Survey:   No TTP over bony prominences, SILT, palpable pulses, full/painless A/PROM, compartments soft. No TTP over spinous processes or paraspinal muscles at C/T/L spine. No palpable step off. No other injuries or complaints.

## 2021-04-12 NOTE — ED PROVIDER NOTE - PLAN OF CARE
Comminuted Left distal femur fracture proximal to a prior total knee replacement, initial encounter.

## 2021-04-13 DIAGNOSIS — I10 ESSENTIAL (PRIMARY) HYPERTENSION: ICD-10-CM

## 2021-04-13 DIAGNOSIS — R52 PAIN, UNSPECIFIED: ICD-10-CM

## 2021-04-13 DIAGNOSIS — S72.92XA UNSPECIFIED FRACTURE OF LEFT FEMUR, INITIAL ENCOUNTER FOR CLOSED FRACTURE: ICD-10-CM

## 2021-04-13 LAB
ANION GAP SERPL CALC-SCNC: 13 MMOL/L — SIGNIFICANT CHANGE UP (ref 5–17)
ANION GAP SERPL CALC-SCNC: 14 MMOL/L — SIGNIFICANT CHANGE UP (ref 5–17)
APTT BLD: 27.3 SEC — LOW (ref 27.5–35.5)
BUN SERPL-MCNC: 28 MG/DL — HIGH (ref 7–23)
BUN SERPL-MCNC: 30 MG/DL — HIGH (ref 7–23)
CALCIUM SERPL-MCNC: 8.9 MG/DL — SIGNIFICANT CHANGE UP (ref 8.4–10.5)
CALCIUM SERPL-MCNC: 9.2 MG/DL — SIGNIFICANT CHANGE UP (ref 8.4–10.5)
CHLORIDE SERPL-SCNC: 100 MMOL/L — SIGNIFICANT CHANGE UP (ref 96–108)
CHLORIDE SERPL-SCNC: 99 MMOL/L — SIGNIFICANT CHANGE UP (ref 96–108)
CO2 SERPL-SCNC: 23 MMOL/L — SIGNIFICANT CHANGE UP (ref 22–31)
CO2 SERPL-SCNC: 23 MMOL/L — SIGNIFICANT CHANGE UP (ref 22–31)
COVID-19 SPIKE DOMAIN AB INTERP: NEGATIVE — SIGNIFICANT CHANGE UP
COVID-19 SPIKE DOMAIN ANTIBODY RESULT: 0.4 U/ML — SIGNIFICANT CHANGE UP
CREAT SERPL-MCNC: 1.35 MG/DL — HIGH (ref 0.5–1.3)
CREAT SERPL-MCNC: 1.42 MG/DL — HIGH (ref 0.5–1.3)
GLUCOSE SERPL-MCNC: 158 MG/DL — HIGH (ref 70–99)
GLUCOSE SERPL-MCNC: 214 MG/DL — HIGH (ref 70–99)
HCT VFR BLD CALC: 26.1 % — LOW (ref 34.5–45)
HCT VFR BLD CALC: 30.1 % — LOW (ref 34.5–45)
HGB BLD-MCNC: 8.4 G/DL — LOW (ref 11.5–15.5)
HGB BLD-MCNC: 9.7 G/DL — LOW (ref 11.5–15.5)
INR BLD: 1.04 RATIO — SIGNIFICANT CHANGE UP (ref 0.88–1.16)
MCHC RBC-ENTMCNC: 29.9 PG — SIGNIFICANT CHANGE UP (ref 27–34)
MCHC RBC-ENTMCNC: 30.4 PG — SIGNIFICANT CHANGE UP (ref 27–34)
MCHC RBC-ENTMCNC: 32.2 GM/DL — SIGNIFICANT CHANGE UP (ref 32–36)
MCHC RBC-ENTMCNC: 32.2 GM/DL — SIGNIFICANT CHANGE UP (ref 32–36)
MCV RBC AUTO: 92.9 FL — SIGNIFICANT CHANGE UP (ref 80–100)
MCV RBC AUTO: 94.6 FL — SIGNIFICANT CHANGE UP (ref 80–100)
NRBC # BLD: 0 /100 WBCS — SIGNIFICANT CHANGE UP (ref 0–0)
NRBC # BLD: 0 /100 WBCS — SIGNIFICANT CHANGE UP (ref 0–0)
PLATELET # BLD AUTO: 203 K/UL — SIGNIFICANT CHANGE UP (ref 150–400)
PLATELET # BLD AUTO: 219 K/UL — SIGNIFICANT CHANGE UP (ref 150–400)
POTASSIUM SERPL-MCNC: 4.4 MMOL/L — SIGNIFICANT CHANGE UP (ref 3.5–5.3)
POTASSIUM SERPL-MCNC: 4.5 MMOL/L — SIGNIFICANT CHANGE UP (ref 3.5–5.3)
POTASSIUM SERPL-SCNC: 4.4 MMOL/L — SIGNIFICANT CHANGE UP (ref 3.5–5.3)
POTASSIUM SERPL-SCNC: 4.5 MMOL/L — SIGNIFICANT CHANGE UP (ref 3.5–5.3)
PROTHROM AB SERPL-ACNC: 12.4 SEC — SIGNIFICANT CHANGE UP (ref 10.6–13.6)
RBC # BLD: 2.76 M/UL — LOW (ref 3.8–5.2)
RBC # BLD: 3.24 M/UL — LOW (ref 3.8–5.2)
RBC # FLD: 13.1 % — SIGNIFICANT CHANGE UP (ref 10.3–14.5)
RBC # FLD: 13.2 % — SIGNIFICANT CHANGE UP (ref 10.3–14.5)
SARS-COV-2 IGG+IGM SERPL QL IA: 0.4 U/ML — SIGNIFICANT CHANGE UP
SARS-COV-2 IGG+IGM SERPL QL IA: NEGATIVE — SIGNIFICANT CHANGE UP
SODIUM SERPL-SCNC: 135 MMOL/L — SIGNIFICANT CHANGE UP (ref 135–145)
SODIUM SERPL-SCNC: 137 MMOL/L — SIGNIFICANT CHANGE UP (ref 135–145)
WBC # BLD: 11.12 K/UL — HIGH (ref 3.8–10.5)
WBC # BLD: 11.24 K/UL — HIGH (ref 3.8–10.5)
WBC # FLD AUTO: 11.12 K/UL — HIGH (ref 3.8–10.5)
WBC # FLD AUTO: 11.24 K/UL — HIGH (ref 3.8–10.5)

## 2021-04-13 PROCEDURE — 73560 X-RAY EXAM OF KNEE 1 OR 2: CPT | Mod: 26,LT

## 2021-04-13 PROCEDURE — 73552 X-RAY EXAM OF FEMUR 2/>: CPT | Mod: 26,LT

## 2021-04-13 RX ORDER — ACETAMINOPHEN 500 MG
1000 TABLET ORAL ONCE
Refills: 0 | Status: COMPLETED | OUTPATIENT
Start: 2021-04-13 | End: 2021-04-13

## 2021-04-13 RX ORDER — HYDROCHLOROTHIAZIDE 25 MG
12.5 TABLET ORAL DAILY
Refills: 0 | Status: DISCONTINUED | OUTPATIENT
Start: 2021-04-13 | End: 2021-04-19

## 2021-04-13 RX ORDER — POTASSIUM CHLORIDE 20 MEQ
20 PACKET (EA) ORAL ONCE
Refills: 0 | Status: COMPLETED | OUTPATIENT
Start: 2021-04-13 | End: 2021-04-14

## 2021-04-13 RX ORDER — ACETAMINOPHEN 500 MG
750 TABLET ORAL ONCE
Refills: 0 | Status: DISCONTINUED | OUTPATIENT
Start: 2021-04-13 | End: 2021-04-13

## 2021-04-13 RX ORDER — FAMOTIDINE 10 MG/ML
20 INJECTION INTRAVENOUS DAILY
Refills: 0 | Status: DISCONTINUED | OUTPATIENT
Start: 2021-04-13 | End: 2021-04-19

## 2021-04-13 RX ORDER — ATORVASTATIN CALCIUM 80 MG/1
20 TABLET, FILM COATED ORAL AT BEDTIME
Refills: 0 | Status: DISCONTINUED | OUTPATIENT
Start: 2021-04-13 | End: 2021-04-19

## 2021-04-13 RX ORDER — SODIUM CHLORIDE 9 MG/ML
1000 INJECTION INTRAMUSCULAR; INTRAVENOUS; SUBCUTANEOUS
Refills: 0 | Status: DISCONTINUED | OUTPATIENT
Start: 2021-04-13 | End: 2021-04-19

## 2021-04-13 RX ORDER — POLYETHYLENE GLYCOL 3350 17 G/17G
17 POWDER, FOR SOLUTION ORAL DAILY
Refills: 0 | Status: DISCONTINUED | OUTPATIENT
Start: 2021-04-13 | End: 2021-04-19

## 2021-04-13 RX ORDER — MAGNESIUM HYDROXIDE 400 MG/1
30 TABLET, CHEWABLE ORAL DAILY
Refills: 0 | Status: DISCONTINUED | OUTPATIENT
Start: 2021-04-13 | End: 2021-04-19

## 2021-04-13 RX ORDER — OXYCODONE HYDROCHLORIDE 5 MG/1
2.5 TABLET ORAL EVERY 4 HOURS
Refills: 0 | Status: DISCONTINUED | OUTPATIENT
Start: 2021-04-13 | End: 2021-04-19

## 2021-04-13 RX ORDER — ACETAMINOPHEN 500 MG
975 TABLET ORAL EVERY 8 HOURS
Refills: 0 | Status: COMPLETED | OUTPATIENT
Start: 2021-04-14 | End: 2021-04-16

## 2021-04-13 RX ORDER — OXYCODONE HYDROCHLORIDE 5 MG/1
5 TABLET ORAL EVERY 4 HOURS
Refills: 0 | Status: DISCONTINUED | OUTPATIENT
Start: 2021-04-13 | End: 2021-04-19

## 2021-04-13 RX ORDER — ONDANSETRON 8 MG/1
4 TABLET, FILM COATED ORAL ONCE
Refills: 0 | Status: DISCONTINUED | OUTPATIENT
Start: 2021-04-13 | End: 2021-04-13

## 2021-04-13 RX ORDER — ONDANSETRON 8 MG/1
4 TABLET, FILM COATED ORAL EVERY 6 HOURS
Refills: 0 | Status: DISCONTINUED | OUTPATIENT
Start: 2021-04-13 | End: 2021-04-19

## 2021-04-13 RX ORDER — SENNA PLUS 8.6 MG/1
2 TABLET ORAL AT BEDTIME
Refills: 0 | Status: DISCONTINUED | OUTPATIENT
Start: 2021-04-13 | End: 2021-04-19

## 2021-04-13 RX ORDER — METOPROLOL TARTRATE 50 MG
50 TABLET ORAL DAILY
Refills: 0 | Status: DISCONTINUED | OUTPATIENT
Start: 2021-04-14 | End: 2021-04-19

## 2021-04-13 RX ORDER — FUROSEMIDE 40 MG
20 TABLET ORAL DAILY
Refills: 0 | Status: DISCONTINUED | OUTPATIENT
Start: 2021-04-13 | End: 2021-04-13

## 2021-04-13 RX ORDER — ACETAMINOPHEN 500 MG
975 TABLET ORAL EVERY 8 HOURS
Refills: 0 | Status: DISCONTINUED | OUTPATIENT
Start: 2021-04-13 | End: 2021-04-13

## 2021-04-13 RX ORDER — METOPROLOL TARTRATE 50 MG
50 TABLET ORAL DAILY
Refills: 0 | Status: DISCONTINUED | OUTPATIENT
Start: 2021-04-13 | End: 2021-04-13

## 2021-04-13 RX ORDER — HYDROMORPHONE HYDROCHLORIDE 2 MG/ML
0.25 INJECTION INTRAMUSCULAR; INTRAVENOUS; SUBCUTANEOUS
Refills: 0 | Status: DISCONTINUED | OUTPATIENT
Start: 2021-04-13 | End: 2021-04-13

## 2021-04-13 RX ORDER — FUROSEMIDE 40 MG
20 TABLET ORAL ONCE
Refills: 0 | Status: COMPLETED | OUTPATIENT
Start: 2021-04-13 | End: 2021-04-14

## 2021-04-13 RX ORDER — CLOPIDOGREL BISULFATE 75 MG/1
75 TABLET, FILM COATED ORAL DAILY
Refills: 0 | Status: DISCONTINUED | OUTPATIENT
Start: 2021-04-14 | End: 2021-04-19

## 2021-04-13 RX ORDER — VANCOMYCIN HCL 1 G
1000 VIAL (EA) INTRAVENOUS ONCE
Refills: 0 | Status: COMPLETED | OUTPATIENT
Start: 2021-04-14 | End: 2021-04-14

## 2021-04-13 RX ORDER — ASPIRIN/CALCIUM CARB/MAGNESIUM 324 MG
81 TABLET ORAL DAILY
Refills: 0 | Status: DISCONTINUED | OUTPATIENT
Start: 2021-04-13 | End: 2021-04-19

## 2021-04-13 RX ORDER — SODIUM CHLORIDE 9 MG/ML
250 INJECTION INTRAMUSCULAR; INTRAVENOUS; SUBCUTANEOUS ONCE
Refills: 0 | Status: COMPLETED | OUTPATIENT
Start: 2021-04-14 | End: 2021-04-14

## 2021-04-13 RX ORDER — LANOLIN ALCOHOL/MO/W.PET/CERES
3 CREAM (GRAM) TOPICAL AT BEDTIME
Refills: 0 | Status: DISCONTINUED | OUTPATIENT
Start: 2021-04-13 | End: 2021-04-19

## 2021-04-13 RX ADMIN — SENNA PLUS 2 TABLET(S): 8.6 TABLET ORAL at 21:26

## 2021-04-13 RX ADMIN — Medication 400 MILLIGRAM(S): at 06:47

## 2021-04-13 RX ADMIN — Medication 400 MILLIGRAM(S): at 00:50

## 2021-04-13 RX ADMIN — FAMOTIDINE 20 MILLIGRAM(S): 10 INJECTION INTRAVENOUS at 11:53

## 2021-04-13 RX ADMIN — Medication 1000 MILLIGRAM(S): at 01:30

## 2021-04-13 RX ADMIN — HYDROMORPHONE HYDROCHLORIDE 0.25 MILLIGRAM(S): 2 INJECTION INTRAMUSCULAR; INTRAVENOUS; SUBCUTANEOUS at 19:55

## 2021-04-13 RX ADMIN — Medication 1 TABLET(S): at 11:54

## 2021-04-13 RX ADMIN — Medication 400 MILLIGRAM(S): at 23:57

## 2021-04-13 RX ADMIN — SODIUM CHLORIDE 125 MILLILITER(S): 9 INJECTION INTRAMUSCULAR; INTRAVENOUS; SUBCUTANEOUS at 19:55

## 2021-04-13 RX ADMIN — Medication 1000 MILLIGRAM(S): at 07:20

## 2021-04-13 RX ADMIN — SODIUM CHLORIDE 125 MILLILITER(S): 9 INJECTION INTRAMUSCULAR; INTRAVENOUS; SUBCUTANEOUS at 00:51

## 2021-04-13 RX ADMIN — ATORVASTATIN CALCIUM 20 MILLIGRAM(S): 80 TABLET, FILM COATED ORAL at 21:26

## 2021-04-13 RX ADMIN — SODIUM CHLORIDE 90 MILLILITER(S): 9 INJECTION INTRAMUSCULAR; INTRAVENOUS; SUBCUTANEOUS at 23:56

## 2021-04-13 RX ADMIN — HYDROMORPHONE HYDROCHLORIDE 0.25 MILLIGRAM(S): 2 INJECTION INTRAMUSCULAR; INTRAVENOUS; SUBCUTANEOUS at 20:10

## 2021-04-13 NOTE — PROVIDER CONTACT NOTE (OTHER) - ASSESSMENT
pt AOx4, VSS, and resting in bed. pt arrived from ED @ [facility], connected to primafit & primafit container shows 50cc of dark, yellow urine. bladder scan shows maximum 48cc. pt denies urge to urinate, abd non-tender/non-distended.
during pre-op checklist, pt asked last dose of Toprol 50 XL taken and pt replied "It was before I came to the hospital." RN checked EMAR, pt was due for Toprol XL @ 1800, ED RN did not administer d/t SBP 92.

## 2021-04-13 NOTE — PROGRESS NOTE ADULT - ASSESSMENT
Assessment:  Patient is a 89 y/o female s/p a mechanical fall resulting in a left distal femur periprosthetic fracture. Scheduled for ORIF today with Dr. Sykes. Stable at this time.    Plan:  -LLE non-weightbearing  -NPO for surgery today  -Continue IV fluids - 0.9% NaCl at 125ml/hr  -DVT prophylaxis - Heparin sq (completed)  -Pain management - Oxycodone 5mg q4 for mild pain, Oxycodone 10mg q4 for moderate, Dilaudid 0.5mg IV q6 for severe pain  -Follow up medical clearance for surgery  -Continue monitoring vital signs      ANUJ Aguilar  Ortho Pager [phone]   Assessment:  Patient is a 89 y/o female s/p a mechanical fall resulting in a left distal femur periprosthetic fracture. Scheduled for ORIF today with Dr. Sykes. Stable at this time.

## 2021-04-13 NOTE — PROGRESS NOTE ADULT - SUBJECTIVE AND OBJECTIVE BOX
Subjective:  Patient seen resting in bed. States her left knee is in a lot of pain, does not remember the last time she received pain meds. Denies chest pain, shortness of breath, dizziness, or nausea.    Objective:    PE:  General: AAOx3, appears uncomfortable but in no acute distress  Cardio: Normal S1S2, normal rate and rhythm  Pulm: Lung apices clear to auscultation bilaterally  Abdomen: Soft and non-tender, normal bowel sounds x4  Extremities:  LLE:  -Bulky [place] knee immobilizer in place   -Calves soft and non-tender bilaterally  -Sensation to light touch intact bilaterally  -DF/PF, EHL/FHL 5/5 bilaterally  -DP pulses 2+    Subjective:  Patient seen resting in bed. States her left knee is in a lot of pain, does not remember the last time she received pain meds. Denies chest pain, shortness of breath, dizziness, or nausea.    Objective:    ICU Vital Signs Last 24 Hrs  T(C): 37.2 (13 Apr 2021 04:38), Max: 37.2 (13 Apr 2021 04:38)  T(F): 98.9 (13 Apr 2021 04:38), Max: 98.9 (13 Apr 2021 04:38)  HR: 71 (13 Apr 2021 04:38) (61 - 76)  BP: 101/64 (13 Apr 2021 04:38) (88/66 - 110/56)  BP(mean): 70 (12 Apr 2021 23:30) (70 - 78)  ABP: --  ABP(mean): --  RR: 18 (13 Apr 2021 04:38) (16 - 20)  SpO2: 99% (13 Apr 2021 04:38) (96% - 100%)      PE:  General: AAOx3, appears uncomfortable but in no acute distress  Cardio: Normal S1S2, normal rate and rhythm  Pulm: Lung apices clear to auscultation bilaterally  Abdomen: Soft and non-tender, normal bowel sounds x4  Extremities:  LLE:  -Bulky [place] knee immobilizer in place  -Compartments are soft   -Calves soft and non-tender bilaterally  -Sensation to light touch intact bilaterally  -DF/PF, EHL/FHL 5/5 bilaterally  -DP pulses 2+     PT/INR - ( 13 Apr 2021 04:57 )   PT: 12.4 sec;   INR: 1.04 ratio         PTT - ( 13 Apr 2021 04:57 )  PTT:27.3 sec    ABO Interpretation: B (04-12-21 @ 18:42)                          9.7    11.12 )-----------( 219      ( 13 Apr 2021 04:57 )             30.1   04-13    135  |  99  |  30<H>  ----------------------------<  158<H>  4.4   |  23  |  1.42<H>    Ca    9.2      13 Apr 2021 04:57    TPro  6.4  /  Alb  3.7  /  TBili  0.5  /  DBili  x   /  AST  53<H>  /  ALT  18  /  AlkPhos  74  04-12

## 2021-04-13 NOTE — PROVIDER CONTACT NOTE (OTHER) - ACTION/TREATMENT ORDERED:
Checo BECK notified & aware. Pt to remain on NS @ 100cc & allowed more time for void. Will cont to monitor.
Checo BECK notified & aware. Day shift RN to assess HR @ 1100, if pt's HR >110 pt to receive dose of Toprol XL. Day shift RN to be notified & will cont to monitor.

## 2021-04-13 NOTE — BRIEF OPERATIVE NOTE - NSICDXBRIEFPREOP_GEN_ALL_CORE_FT
PRE-OP DIAGNOSIS:  Other fracture of distal end of left femur 13-Apr-2021 17:56:26  Sammi Gutierrez

## 2021-04-13 NOTE — PRE-ANESTHESIA EVALUATION ADULT - NSANTHOSAYNRD_GEN_A_CORE
No. ADILENE screening performed.  STOP BANG Legend: 0-2 = LOW Risk; 3-4 = INTERMEDIATE Risk; 5-8 = HIGH Risk

## 2021-04-13 NOTE — PRE-ANESTHESIA EVALUATION ADULT - NSANTHPMHFT_GEN_ALL_CORE
chart + im note reviewed. remote hx cad s/p stents > 10 yo per pt. stable cv clinical status per pt since- et ~1 flight no cp/sob. no signs active cad/chf. mild nimesh.

## 2021-04-13 NOTE — PRE-ANESTHESIA EVALUATION ADULT - NSANTHADDINFOFT_GEN_ALL_CORE
extensive rba dw pt at bedside, agrees with plan extensive rba dw pt at bedside, agrees with plan. neuraxial deferred in setting of recent plavix use

## 2021-04-13 NOTE — CHART NOTE - NSCHARTNOTEFT_GEN_A_CORE
POC    Resting without complaint in RR  No Chest Pain, SOB, N/V.    T(C): 36.8 (04-13-21 @ 20:00), Max: 37.2 (04-13-21 @ 04:38)  HR: 80 (04-13-21 @ 20:00) (69 - 96)  BP: 115/59 (04-13-21 @ 20:00) (88/66 - 126/59)  RR: 18 (04-13-21 @ 20:00) (16 - 20)  SpO2: 100% (04-13-21 @ 20:00) (97% - 100%)  Wt(kg): --    Exam:  Alert and [place], lucid, conversive, following commands No Acute Distress  Card: +S1/S2, RRR  Pulm: CTAB  Abdomen soft / benign  Gusman  [ n]   EXT   LLE       Dressing (s) C/D  [x ]           Compression ACE in place       Calves / compartments soft       (+) moving all digits (+) DF/PF        No Sensory Deficits noted        1+ pulses                        8.4<L>  11.24<H> )-----------( 203      ( 13 Apr 2021 19:44 )             26.1<L>     04-13    135  |  99  |  30<H>  ----------------------------<  158<H>  4.4   |  23  |  1.42<H>      A/P: S/p ORIF fracture of left distal femur    - Transfuse 1 U prbc's 2/2 acute blood loss / post-op anemia  -PT/OT-WBAT- w/assistance  -Chk AM Labs cbc bmp  -DVT PPx: plavix / Baby ASA QD  -Pain Control PO/IV Pain Rx  -Continue Current Tx  -Dispo planning: anticipate rehab      ***See Above  Lg BECK  Orthopedics  B: [id number] POC    Resting without complaint in RR  No Chest Pain, SOB, N/V.    T(C): 36.8 (04-13-21 @ 20:00), Max: 37.2 (04-13-21 @ 04:38)  HR: 80 (04-13-21 @ 20:00) (69 - 96)  BP: 115/59 (04-13-21 @ 20:00) (88/66 - 126/59)  RR: 18 (04-13-21 @ 20:00) (16 - 20)  SpO2: 100% (04-13-21 @ 20:00) (97% - 100%)  Wt(kg): --    Exam:  Alert and [place], lucid, conversive, following commands No Acute Distress  Card: +S1/S2, RRR  Pulm: CTAB  Abdomen soft / benign  Gusman  [ n]   EXT   LLE       Dressing (s) C/D  [x ]           Compression ACE in place       Calves / compartments soft       (+) moving all digits (+) DF/PF        No Sensory Deficits noted        1+ pulses               Xray:  TANYA plate w/ screws in place                 8.4<L>  11.24<H> )-----------( 203      ( 13 Apr 2021 19:44 )             26.1<L>     04-13    135  |  99  |  30<H>  ----------------------------<  158<H>  4.4   |  23  |  1.42<H>      A/P: S/p ORIF fracture of left distal femur    - Transfuse 1 U prbc's 2/2 acute blood loss / intra-op anemia  - PT:  NWB LLE- w/assistance  -Chk AM Labs cbc/ bmp  -DVT PPx: plavix / Baby ASA QD  -Pain Control PO/IV Pain Rx--low dose narcs   -Continue Current Tx  -Dispo planning: anticipate rehab      ***See Above  Lg BECK  Orthopedics  B: [phone]

## 2021-04-13 NOTE — PROGRESS NOTE ADULT - PROBLEM SELECTOR PLAN 1
Plan:  -LLE non-weightbearing, bedrest appropriate  -NPO for surgery today  -Continue IV fluids - 0.9% NaCl at 100ml/hr  -DVT prophylaxis - Heparin sq (completed/held for OR)  -Pain management - Oxycodone 5mg q4 for mild pain, Oxycodone 10mg q4 for moderate, Dilaudid 0.5mg IV q6 for severe pain  -Follow up medical clearance for surgery  -Continue monitoring vital signs    ANUJ Aguilar  Ortho Pager [phone]    I have reviewed the student's note, examined the patient, changed where appropriate.  I agree with the above note.    AILEEN Urbina PA-C  #[id number]

## 2021-04-13 NOTE — CONSULT NOTE ADULT - SUBJECTIVE AND OBJECTIVE BOX
89 y/o F pmhx htn, hld, CAD on plavix remote history of b/l TKR presenting with severe L knee pain s/p fall. Patient reports that she lives by herself at home and was making her bed when she tripped over something, likely the bed sheets and fell. She states that she didn't lose consciousness but it happened so fast she doesn't remember everything that happened and isn't sure if she hit her head or not. She reports that she was able to move herself on the floor over to the phone to call her daughter to come help her. Was not able to get up on her own and daughter came to get her, noted that she was not able to get up and called EMS. Reported that her knee became very swollen and bruised quickly. Patient was brought to [facility] for further eval. Patient was found to have a left periprosthetic distal femur fx. Patient is scheduled for Open reduction and internal fixation of distal femur. Patient seen now resting comfortably     PAST MEDICAL & SURGICAL HISTORY:  Hypertension    Hyperlipidemia    Coronary stent occlusion, initial encounter    B/L TKR    [facility]           MEDICATIONS  (STANDING):  atorvastatin 20 milliGRAM(s) Oral at bedtime  famotidine    Tablet 20 milliGRAM(s) Oral daily  hydrochlorothiazide 12.5 milliGRAM(s) Oral daily  metoprolol succinate ER 50 milliGRAM(s) Oral daily  multivitamin 1 Tablet(s) Oral daily  sodium chloride 0.9%. 1000 milliLiter(s) (125 mL/Hr) IV Continuous <Continuous>    MEDICATIONS  (PRN):  magnesium hydroxide Suspension 30 milliLiter(s) Oral daily PRN Constipation  oxyCODONE    IR 10 milliGRAM(s) Oral every 4 hours PRN Moderate Pain (4 - 6)  oxyCODONE    IR 5 milliGRAM(s) Oral every 4 hours PRN Mild Pain (1 - 3)  senna 2 Tablet(s) Oral at bedtime PRN Constipation    Social Hx:  Tobacco: former smoker  ETOH: neg  Drugs: Neg    Family Hx:  As per my conversation with the patient, non contributory      ROS  CONSTITUTIONAL: No weakness, fevers or chills  EYES/ENT: No visual changes;  No vertigo or throat pain   NECK: No pain or stiffness  RESPIRATORY: No cough, wheezing, hemoptysis; No shortness of breath  CARDIOVASCULAR: No chest pain or palpitations  GASTROINTESTINAL: No abdominal or epigastric pain. No nausea, vomiting, or hematemesis; No diarrhea + constipation. No melena or hematochezia.  GENITOURINARY: No dysuria, + frequency  NEUROLOGICAL: No numbness or weakness  SKIN: No itching, burning, rashes, or lesions   MUSCULOSKELETAL:  Left leg pain    INTERVAL HPI/OVERNIGHT EVENTS:  T(C): 36.7 (04-13-21 @ 08:43), Max: 37.2 (04-13-21 @ 04:38)  HR: 71 (04-13-21 @ 08:43) (61 - 76)  BP: 96/64 (04-13-21 @ 08:43) (88/66 - 110/56)  RR: 18 (04-13-21 @ 08:43) (16 - 20)  SpO2: 99% (04-13-21 @ 08:43) (96% - 100%)  Wt(kg): --  I&O's Summary    12 Apr 2021 07:01  -  13 Apr 2021 07:00  --------------------------------------------------------  IN: 950 mL / OUT: 100 mL / NET: 850 mL    13 Apr 2021 07:01  -  13 Apr 2021 11:18  --------------------------------------------------------  IN: 0 mL / OUT: 0 mL / NET: 0 mL        PHYSICAL EXAM:  GENERAL: NAD, well-groomed, well-developed  HEAD:  Atraumatic, Normocephalic  EYES: EOMI, PERRLA, conjunctiva and sclera clear  ENMT: No tonsillar erythema, exudates, or enlargement; Moist mucous membranes, Good dentition, No lesions  NECK: Supple, No JVD, Normal thyroid  NERVOUS SYSTEM:  Alert & Oriented X3, Good concentration; Motor Strength 5/5 B/L upper and lower extremities; DTRs 2+ intact and symmetric  CHEST/LUNG: Clear to percussion bilaterally; No rales, rhonchi, wheezing, or rubs  HEART: Regular rate and rhythm; No murmurs, rubs, or gallops  ABDOMEN: Soft, Nontender, Nondistended; Bowel sounds present  EXTREMITIES:  2+ Peripheral Pulses, No clubbing, cyanosis, or edema  LYMPH: No lymphadenopathy noted  SKIN: No rashes or lesions        LABS:                        9.7    11.12 )-----------( 219      ( 13 Apr 2021 04:57 )             30.1     04-13    135  |  99  |  30<H>  ----------------------------<  158<H>  4.4   |  23  |  1.42<H>    Ca    9.2      13 Apr 2021 04:57    TPro  6.4  /  Alb  3.7  /  TBili  0.5  /  DBili  x   /  AST  53<H>  /  ALT  18  /  AlkPhos  74  04-12    PT/INR - ( 13 Apr 2021 04:57 )   PT: 12.4 sec;   INR: 1.04 ratio         PTT - ( 13 Apr 2021 04:57 )  PTT:27.3 sec    EKG NSR @ 61

## 2021-04-13 NOTE — CONSULT NOTE ADULT - PROBLEM SELECTOR RECOMMENDATION 9
No contraindication  to scheduled procedure  Pain meds as needed  NPO for procedure  DVT and GI prophylaxis

## 2021-04-13 NOTE — CONSULT NOTE ADULT - ASSESSMENT
89 yo woman presents after a fall at home with a periprosthetic left distal femur fx scheduled for Open reduction and internal fixation

## 2021-04-13 NOTE — BRIEF OPERATIVE NOTE - COMMENTS
Since patient is on plavix, given the risk of GIB but the desire to adequately anticoagulate, the decision was made to continue plavix and add aspirin 81 QD

## 2021-04-13 NOTE — BRIEF OPERATIVE NOTE - NSICDXBRIEFPOSTOP_GEN_ALL_CORE_FT
POST-OP DIAGNOSIS:  Other fracture of distal end of left femur 13-Apr-2021 17:56:36  Sammi Gutierrez

## 2021-04-14 DIAGNOSIS — M97.8XXA PERIPROSTHETIC FRACTURE AROUND OTHER INTERNAL PROSTHETIC JOINT, INITIAL ENCOUNTER: ICD-10-CM

## 2021-04-14 LAB
ANION GAP SERPL CALC-SCNC: 13 MMOL/L — SIGNIFICANT CHANGE UP (ref 5–17)
ANION GAP SERPL CALC-SCNC: 9 MMOL/L — SIGNIFICANT CHANGE UP (ref 5–17)
BUN SERPL-MCNC: 27 MG/DL — HIGH (ref 7–23)
BUN SERPL-MCNC: 32 MG/DL — HIGH (ref 7–23)
CALCIUM SERPL-MCNC: 8 MG/DL — LOW (ref 8.4–10.5)
CALCIUM SERPL-MCNC: 8.3 MG/DL — LOW (ref 8.4–10.5)
CHLORIDE SERPL-SCNC: 101 MMOL/L — SIGNIFICANT CHANGE UP (ref 96–108)
CHLORIDE SERPL-SCNC: 104 MMOL/L — SIGNIFICANT CHANGE UP (ref 96–108)
CO2 SERPL-SCNC: 22 MMOL/L — SIGNIFICANT CHANGE UP (ref 22–31)
CO2 SERPL-SCNC: 25 MMOL/L — SIGNIFICANT CHANGE UP (ref 22–31)
CREAT SERPL-MCNC: 1.21 MG/DL — SIGNIFICANT CHANGE UP (ref 0.5–1.3)
CREAT SERPL-MCNC: 1.43 MG/DL — HIGH (ref 0.5–1.3)
GLUCOSE SERPL-MCNC: 123 MG/DL — HIGH (ref 70–99)
GLUCOSE SERPL-MCNC: 140 MG/DL — HIGH (ref 70–99)
HCT VFR BLD CALC: 23 % — LOW (ref 34.5–45)
HGB BLD-MCNC: 7.5 G/DL — LOW (ref 11.5–15.5)
MCHC RBC-ENTMCNC: 30.5 PG — SIGNIFICANT CHANGE UP (ref 27–34)
MCHC RBC-ENTMCNC: 32.6 GM/DL — SIGNIFICANT CHANGE UP (ref 32–36)
MCV RBC AUTO: 93.5 FL — SIGNIFICANT CHANGE UP (ref 80–100)
NRBC # BLD: 0 /100 WBCS — SIGNIFICANT CHANGE UP (ref 0–0)
PLATELET # BLD AUTO: 142 K/UL — LOW (ref 150–400)
POTASSIUM SERPL-MCNC: 4.2 MMOL/L — SIGNIFICANT CHANGE UP (ref 3.5–5.3)
POTASSIUM SERPL-MCNC: 4.4 MMOL/L — SIGNIFICANT CHANGE UP (ref 3.5–5.3)
POTASSIUM SERPL-SCNC: 4.2 MMOL/L — SIGNIFICANT CHANGE UP (ref 3.5–5.3)
POTASSIUM SERPL-SCNC: 4.4 MMOL/L — SIGNIFICANT CHANGE UP (ref 3.5–5.3)
RBC # BLD: 2.46 M/UL — LOW (ref 3.8–5.2)
RBC # FLD: 13.4 % — SIGNIFICANT CHANGE UP (ref 10.3–14.5)
SODIUM SERPL-SCNC: 136 MMOL/L — SIGNIFICANT CHANGE UP (ref 135–145)
SODIUM SERPL-SCNC: 138 MMOL/L — SIGNIFICANT CHANGE UP (ref 135–145)
WBC # BLD: 7.55 K/UL — SIGNIFICANT CHANGE UP (ref 3.8–10.5)
WBC # FLD AUTO: 7.55 K/UL — SIGNIFICANT CHANGE UP (ref 3.8–10.5)

## 2021-04-14 RX ORDER — KETOROLAC TROMETHAMINE 30 MG/ML
15 SYRINGE (ML) INJECTION ONCE
Refills: 0 | Status: DISCONTINUED | OUTPATIENT
Start: 2021-04-14 | End: 2021-04-14

## 2021-04-14 RX ORDER — TRAMADOL HYDROCHLORIDE 50 MG/1
50 TABLET ORAL EVERY 6 HOURS
Refills: 0 | Status: DISCONTINUED | OUTPATIENT
Start: 2021-04-14 | End: 2021-04-14

## 2021-04-14 RX ORDER — TRAMADOL HYDROCHLORIDE 50 MG/1
50 TABLET ORAL EVERY 12 HOURS
Refills: 0 | Status: DISCONTINUED | OUTPATIENT
Start: 2021-04-14 | End: 2021-04-19

## 2021-04-14 RX ORDER — SODIUM CHLORIDE 9 MG/ML
1000 INJECTION INTRAMUSCULAR; INTRAVENOUS; SUBCUTANEOUS
Refills: 0 | Status: DISCONTINUED | OUTPATIENT
Start: 2021-04-14 | End: 2021-04-19

## 2021-04-14 RX ADMIN — Medication 20 MILLIGRAM(S): at 00:01

## 2021-04-14 RX ADMIN — Medication 1 TABLET(S): at 13:27

## 2021-04-14 RX ADMIN — CLOPIDOGREL BISULFATE 75 MILLIGRAM(S): 75 TABLET, FILM COATED ORAL at 13:26

## 2021-04-14 RX ADMIN — ATORVASTATIN CALCIUM 20 MILLIGRAM(S): 80 TABLET, FILM COATED ORAL at 20:41

## 2021-04-14 RX ADMIN — Medication 15 MILLIGRAM(S): at 10:40

## 2021-04-14 RX ADMIN — Medication 20 MILLIEQUIVALENT(S): at 00:46

## 2021-04-14 RX ADMIN — SODIUM CHLORIDE 500 MILLILITER(S): 9 INJECTION INTRAMUSCULAR; INTRAVENOUS; SUBCUTANEOUS at 05:32

## 2021-04-14 RX ADMIN — POLYETHYLENE GLYCOL 3350 17 GRAM(S): 17 POWDER, FOR SOLUTION ORAL at 13:27

## 2021-04-14 RX ADMIN — Medication 1000 MILLIGRAM(S): at 00:30

## 2021-04-14 RX ADMIN — Medication 975 MILLIGRAM(S): at 07:57

## 2021-04-14 RX ADMIN — Medication 975 MILLIGRAM(S): at 15:25

## 2021-04-14 RX ADMIN — SENNA PLUS 2 TABLET(S): 8.6 TABLET ORAL at 20:41

## 2021-04-14 RX ADMIN — SODIUM CHLORIDE 90 MILLILITER(S): 9 INJECTION INTRAMUSCULAR; INTRAVENOUS; SUBCUTANEOUS at 13:26

## 2021-04-14 RX ADMIN — Medication 81 MILLIGRAM(S): at 13:26

## 2021-04-14 RX ADMIN — Medication 250 MILLIGRAM(S): at 02:21

## 2021-04-14 RX ADMIN — FAMOTIDINE 20 MILLIGRAM(S): 10 INJECTION INTRAVENOUS at 13:27

## 2021-04-14 RX ADMIN — SODIUM CHLORIDE 60 MILLILITER(S): 9 INJECTION INTRAMUSCULAR; INTRAVENOUS; SUBCUTANEOUS at 20:31

## 2021-04-14 RX ADMIN — SODIUM CHLORIDE 90 MILLILITER(S): 9 INJECTION INTRAMUSCULAR; INTRAVENOUS; SUBCUTANEOUS at 18:30

## 2021-04-14 RX ADMIN — Medication 15 MILLIGRAM(S): at 10:24

## 2021-04-14 NOTE — OCCUPATIONAL THERAPY INITIAL EVALUATION ADULT - DIAGNOSIS, OT EVAL
Pt p/w deficits in strength, balance, coordination and endurance impacting ADLs and functional mobility

## 2021-04-14 NOTE — PROGRESS NOTE ADULT - ASSESSMENT
Assessment:  Patient is a 89 y/o female s/p a mechanical fall resulting in a left distal femur periprosthetic fracture, now POD#1 for left ORIF. Stable at this time.       Assessment:  Patient is a 89 y/o female s/p a mechanical fall resulting in a left distal femur periprosthetic fracture, now POD#1 for left ORIF. Acute blood loss anemia 2/2 periop state/fracture.  Stable at this time.

## 2021-04-14 NOTE — PHYSICAL THERAPY INITIAL EVALUATION ADULT - STRENGTHENING, PT EVAL
GOAL: Pt will improve bilateral LE strength by one MMT grade, for increased limb stability, to improve gait and facilitate stair negotiation in 2 weeks. GOAL: Pt will improve bilateral LE strength by one MMT grade, for increased limb stability, to improve gait and facilitate stair negotiation in 4 weeks.

## 2021-04-14 NOTE — PROGRESS NOTE ADULT - SUBJECTIVE AND OBJECTIVE BOX
Subjective:  Patient seen resting in bed. Per nursing, patient has been refusing her medications, stating they are not helping her. Notes appropriate LLE pain. Also states she is hungry and wants to eat breakfast. Denies chest pain, shortness of breath, nausea, or lightheadedness.     Objective:    PE:  General: AAOx3, appears uncomfortable but in no acute distress   Cardio: Normal rate and rhythm, normal S1S2  Pulm: 1L NC in place. Lung apices clear to auscultation bilaterally  Abdomen: Soft and non-tender, normal bowel sounds x4  Extremities:  LLE:  -Left leg surgical dressing and ace wrap clean, dry, and intact  -Compartments soft  -Calves soft and non-tender bilaterally  -Sensation to light touch intact bilaterally  -DF/PF, EHL/FHL 5/5 bilaterally  -DP pulses 2+    Vital Signs Last 24 Hrs  T(C): 36.7 (14 Apr 2021 05:06), Max: 37.3 (13 Apr 2021 23:55)  T(F): 98.1 (14 Apr 2021 05:06), Max: 99.1 (13 Apr 2021 23:55)  HR: 83 (14 Apr 2021 05:06) (71 - 96)  BP: 116/71 (14 Apr 2021 05:06) (96/64 - 131/90)  RR: 18 (14 Apr 2021 05:06) (16 - 20)  SpO2: 98% (14 Apr 2021 05:06) (97% - 100%)                          8.4    11.24 )-----------( 203      ( 13 Apr 2021 19:44 )             26.1     04-13    136  |  101  |  32<H>  ----------------------------<  140<H>  4.2   |  22  |  1.43<H>    Ca    8.3<L>      13 Apr 2021 19:44    TPro  6.4  /  Alb  3.7  /  TBili  0.5  /  DBili  x   /  AST  53<H>  /  ALT  18  /  AlkPhos  74  04-12    PT/INR - ( 13 Apr 2021 04:57 )   PT: 12.4 sec;   INR: 1.04 ratio         PTT - ( 13 Apr 2021 04:57 )  PTT:27.3 sec   Subjective:  Patient seen resting in bed. Per nursing, patient has been refusing her medications, stating they are not helping her. Notes appropriate LLE pain. Also states she is hungry and wants to eat breakfast. Denies chest pain, shortness of breath, nausea, or lightheadedness.     Objective:    PE:  General: AAOx3, appears uncomfortable but in no acute distress   Cardio: Normal rate and rhythm, normal S1S2  Pulm: 1L NC in place. Lung apices clear to auscultation bilaterally  Abdomen: Soft and non-tender, normal bowel sounds x4  Extremities:  LLE:  -Left leg surgical dressing and ace wrap/foam tape clean, dry, and intact  -Compartments soft  -Calves soft and non-tender bilaterally  -Sensation to light touch intact bilaterally  -DF/PF, EHL/FHL 5/5 bilaterally  -DP pulses 2+    Vital Signs Last 24 Hrs  T(C): 36.7 (14 Apr 2021 05:06), Max: 37.3 (13 Apr 2021 23:55)  T(F): 98.1 (14 Apr 2021 05:06), Max: 99.1 (13 Apr 2021 23:55)  HR: 83 (14 Apr 2021 05:06) (71 - 96)  BP: 116/71 (14 Apr 2021 05:06) (96/64 - 131/90)  RR: 18 (14 Apr 2021 05:06) (16 - 20)  SpO2: 98% (14 Apr 2021 05:06) (97% - 100%)                          8.4    11.24 )-----------( 203      ( 13 Apr 2021 19:44 )             26.1     04-13    136  |  101  |  32<H>  ----------------------------<  140<H>  4.2   |  22  |  1.43<H>    Ca    8.3<L>      13 Apr 2021 19:44    TPro  6.4  /  Alb  3.7  /  TBili  0.5  /  DBili  x   /  AST  53<H>  /  ALT  18  /  AlkPhos  74  04-12    PT/INR - ( 13 Apr 2021 04:57 )   PT: 12.4 sec;   INR: 1.04 ratio         PTT - ( 13 Apr 2021 04:57 )  PTT:27.3 sec

## 2021-04-14 NOTE — PHYSICAL THERAPY INITIAL EVALUATION ADULT - PLANNED THERAPY INTERVENTIONS, PT EVAL
Stair goal: Pt will be able to ascend/descend 2 steps +HR with (S) in 2 weeks./balance training/gait training/strengthening/transfer training Stair goal: Pt will be able to ascend/descend 2 steps +HR with (MinAx1) in 4 weeks./balance training/gait training/strengthening/transfer training

## 2021-04-14 NOTE — OCCUPATIONAL THERAPY INITIAL EVALUATION ADULT - VISUAL ASSESSMENT: TRACKING
Patient seen for paranoia, disorganized behavior. Chart reviewed. No significant interval events – less activated last evening and this morning, more calm and quiet. Eating his meals with good appetite, spends time in the dayroom during the day usually observing groups, and sleeping at night for several hours. Otherwise, Patient is maintain the same clinical presentation. normal

## 2021-04-14 NOTE — OCCUPATIONAL THERAPY INITIAL EVALUATION ADULT - PERTINENT HX OF CURRENT PROBLEM, REHAB EVAL
89y F presents to [facility] ED w/ a c/o of L leg pain after a MF while at home. Pt states she was changing the bed sheets at home when she believes that she tripped on the leg of the bed and fell to the floor, denies preceding CP/SOB/palpitations/N/v/Headache/confusion/dizziness/weakness/fatigue. Unsure of Head trauma/LOC. States inability to walk immediately following the injury. Has TKA on the left but is unable to provide history and unaware who performed procedure.

## 2021-04-14 NOTE — OCCUPATIONAL THERAPY INITIAL EVALUATION ADULT - PRECAUTIONS/LIMITATIONS, REHAB EVAL
L distal femur fracture with ORIF vs IMN (4/13)/fall precautions/left hip precautions L distal femur fracture with ORIF vs IMN (4/13)/fall precautions

## 2021-04-14 NOTE — PHYSICAL THERAPY INITIAL EVALUATION ADULT - TRANSFER TRAINING, PT EVAL
GOAL: Pt will perform ALL transfers (I) w/use of appropriate assistive device as needed, in 2 weeks. GOAL: Pt will perform ALL transfers (MinAx1) w/use of appropriate assistive device as needed, in 4 weeks.

## 2021-04-14 NOTE — PROGRESS NOTE ADULT - PROBLEM SELECTOR PLAN 1
Plan:  -Patient educated regarding meds, patient understands and agrees to continue taking them  -LLE non-weightbearing - PT today  -DVT prophylaxis Aspirin 81mg qd, Plavix 75mg qd  -Pain management - Tylenol 975mg q8, Oxycodone 2.5mg q4 for moderate pain, Oxycodone 5mg q4 for severe pain  -Follow up AM labs - patient received 1U pRBCs in PACU  -Continue monitoring vital signs  -Discharge planning TBD      KIRAN Aguilar-S  Ortho Pager [phone] Plan:  -Patient educated regarding meds, patient understands and agrees to continue taking them  -LLE non-weightbearing - PT today  -DVT prophylaxis Aspirin 81mg qd, Plavix 75mg qd  -Pain management - Tylenol 975mg q8, Oxycodone 2.5mg q4 for moderate pain, Oxycodone 5mg q4 for severe pain  -Follow up AM labs - patient received 1U pRBCs in PACU, transfuse PRN  -Continue monitoring vital signs  -Discharge planning TBD      KIRAN Aguilar-S  Ortho Pager [phone]    I have reviewed the PA student's note, examined the patient, and changed where applicable.  I agree with the above note.      TAINA PenaC  #[id number]

## 2021-04-14 NOTE — PROGRESS NOTE ADULT - ASSESSMENT
89 yo woman presents after a fall at home with a periprosthetic left distal femur fx s/p  Open reduction and internal fixation

## 2021-04-14 NOTE — PHYSICAL THERAPY INITIAL EVALUATION ADULT - GAIT TRAINING, PT EVAL
GOAL: Pt will ambulate 100 feet (S) w/use of appropriate assistive device in 2 weeks GOAL: Pt will ambulate 50 feet (MinAx1) w/use of appropriate assistive device in 4 weeks

## 2021-04-14 NOTE — OCCUPATIONAL THERAPY INITIAL EVALUATION ADULT - LEVEL OF INDEPENDENCE: SUPINE/SIT, REHAB EVAL
Pt required max assist x2 for supine to sit, once sitting EOB pt progressed to min assist x1/maximum assist (25% patients effort)

## 2021-04-14 NOTE — PHYSICAL THERAPY INITIAL EVALUATION ADULT - PERTINENT HX OF CURRENT PROBLEM, REHAB EVAL
89 y/o F, home ambulator with assistive devices (walker) who presents to [facility] ED w/ a c/o of left leg pain s/p fall at home. PMH of LTKA. Pt s/p L distal femur periprosthetic fracture, now POD#1 for LORIF and IMN, now NWB to LLE. Acute blood loss anemia 2/2 periop state/fracture. CT knee (04/12)

## 2021-04-14 NOTE — OCCUPATIONAL THERAPY INITIAL EVALUATION ADULT - LIVES WITH, PROFILE
Pt lives in a mother/daughter home with a tub shower. Pts daughters assist with cooking cleaning and bathing as needed/alone

## 2021-04-14 NOTE — PHYSICAL THERAPY INITIAL EVALUATION ADULT - BALANCE TRAINING, PT EVAL
GOAL: Pt will demonstrate improved static/dynamic seated balance by one grade, in order to improve stability, decrease fall risk and increase independence with ADLs within 2 weeks. GOAL: Pt will demonstrate improved static/dynamic seated balance by one grade, in order to improve stability, decrease fall risk and increase independence with ADLs within 4 weeks.

## 2021-04-14 NOTE — PHYSICAL THERAPY INITIAL EVALUATION ADULT - MANUAL MUSCLE TESTING RESULTS, REHAB EVAL
BUEs grossly assessed to ~3+/5, RLE grossly assessed to ~3/5, unable to assess LLE 2/2 post-op pain/grossly assessed due to

## 2021-04-14 NOTE — PHYSICAL THERAPY INITIAL EVALUATION ADULT - ADDITIONAL COMMENTS
Pt lives alone in  with 2 OLIVA, all needs met on main floor. Pt reports that daughter lives next door. Pt was amb (I) with SC and needed occasional assist from daughter with ADLs PTA. Pt reports that she owns SC and R/W.

## 2021-04-14 NOTE — OCCUPATIONAL THERAPY INITIAL EVALUATION ADULT - ADDITIONAL COMMENTS
X-Ray L Knee (4/12): Comminuted, displaced distal femoral periprosthetic fracture. There is 3 cm of post  displacement of the distal fracture fragment. The hardware appears intact. There is no prox femoral fracture or dislocations. 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 There is atherosclerotic disease. There is lower lumbar spondylosis  CT Knee (4/12): Status post left total knee arthroplasty with patellar resurfacing. Acute displaced transversely oriented fracture of the distal femoral diaphysis just superior to the femoral component.  X-Ray Chest (4/12): negative

## 2021-04-14 NOTE — PHYSICAL THERAPY INITIAL EVALUATION ADULT - PRECAUTIONS/LIMITATIONS, REHAB EVAL
Acute displaced transversely oriented fracture of the distal femoral diaphysis just superior to the femoral component. Xray pelvis (04/12): Lower lumbar spine degenerative change with dextrocurve. Significantly narrowed pubic symphysis with associated prominent bilateral parasymphyseal sclerotic reaction. Slightly narrowed bilateral superomedial hip joint spaces. Generalized osteopenia. Head CT (4/12) : Moderate volume loss, microvascular disease,/fall precautions

## 2021-04-14 NOTE — PROGRESS NOTE ADULT - SUBJECTIVE AND OBJECTIVE BOX
89 y/o F pmhx htn, hld, CAD on plavix remote history of b/l TKR presenting with severe L knee pain s/p fall. Patient reports that she lives by herself at home and was making her bed when she tripped over something, likely the bed sheets and fell. She states that she didn't lose consciousness but it happened so fast she doesn't remember everything that happened and isn't sure if she hit her head or not. She reports that she was able to move herself on the floor over to the phone to call her daughter to come help her. Was not able to get up on her own and daughter came to get her, noted that she was not able to get up and called EMS. Reported that her knee became very swollen and bruised quickly. Patient was brought to [facility] for further eval. Patient was found to have a left periprosthetic distal femur fx. Patient is s/p  Open reduction and internal fixation of distal femur. Patient seen now resting comfortably     MEDICATIONS  (STANDING):  acetaminophen   Tablet .. 975 milliGRAM(s) Oral every 8 hours  aspirin enteric coated 81 milliGRAM(s) Oral daily  atorvastatin 20 milliGRAM(s) Oral at bedtime  clopidogrel Tablet 75 milliGRAM(s) Oral daily  famotidine    Tablet 20 milliGRAM(s) Oral daily  hydrochlorothiazide 12.5 milliGRAM(s) Oral daily  metoprolol succinate ER 50 milliGRAM(s) Oral daily  multivitamin 1 Tablet(s) Oral daily  polyethylene glycol 3350 17 Gram(s) Oral daily  senna 2 Tablet(s) Oral at bedtime  sodium chloride 0.9%. 1000 milliLiter(s) (90 mL/Hr) IV Continuous <Continuous>    MEDICATIONS  (PRN):  magnesium hydroxide Suspension 30 milliLiter(s) Oral daily PRN Constipation  magnesium hydroxide Suspension 30 milliLiter(s) Oral daily PRN Constipation  melatonin 3 milliGRAM(s) Oral at bedtime PRN Insomnia  ondansetron Injectable 4 milliGRAM(s) IV Push every 6 hours PRN Nausea and/or Vomiting  oxyCODONE    IR 2.5 milliGRAM(s) Oral every 4 hours PRN Moderate Pain (4 - 6)  oxyCODONE    IR 5 milliGRAM(s) Oral every 4 hours PRN Severe Pain (7 - 10)  traMADol 50 milliGRAM(s) Oral every 12 hours PRN Mild Pain (1 - 3)          VITALS:   T(C): 36.7 (04-14-21 @ 09:15), Max: 37.3 (04-13-21 @ 23:55)  HR: 78 (04-14-21 @ 09:15) (75 - 96)  BP: 104/64 (04-14-21 @ 09:15) (104/64 - 131/90)  RR: 18 (04-14-21 @ 09:15) (16 - 20)  SpO2: 96% (04-14-21 @ 09:15) (96% - 100%)  Wt(kg): --      PHYSICAL EXAM:  GENERAL: NAD, well-groomed, well-developed  HEAD:  Atraumatic, Normocephalic  EYES: EOMI, PERRLA, conjunctiva and sclera clear  ENMT: No tonsillar erythema, exudates, or enlargement; Moist mucous membranes, Good dentition, No lesions  NECK: Supple, No JVD, Normal thyroid  NERVOUS SYSTEM:  Alert & Oriented X3, Good concentration; Motor Strength 5/5 B/L upper and lower extremities; DTRs 2+ intact and symmetric  CHEST/LUNG: Clear to percussion bilaterally; No rales, rhonchi, wheezing, or rubs  HEART: Regular rate and rhythm; No murmurs, rubs, or gallops  ABDOMEN: Soft, Nontender, Nondistended; Bowel sounds present  EXTREMITIES:  2+ Peripheral Pulses, No clubbing, cyanosis, or edema  LYMPH: No lymphadenopathy noted  SKIN: No rashes or lesions  LABS:        CBC Full  -  ( 13 Apr 2021 19:44 )  WBC Count : 11.24 K/uL  RBC Count : 2.76 M/uL  Hemoglobin : 8.4 g/dL  Hematocrit : 26.1 %  Platelet Count - Automated : 203 K/uL  Mean Cell Volume : 94.6 fl  Mean Cell Hemoglobin : 30.4 pg  Mean Cell Hemoglobin Concentration : 32.2 gm/dL  Auto Neutrophil # : x  Auto Lymphocyte # : x  Auto Monocyte # : x  Auto Eosinophil # : x  Auto Basophil # : x  Auto Neutrophil % : x  Auto Lymphocyte % : x  Auto Monocyte % : x  Auto Eosinophil % : x  Auto Basophil % : x    04-13    136  |  101  |  32<H>  ----------------------------<  140<H>  4.2   |  22  |  1.43<H>    Ca    8.3<L>      13 Apr 2021 19:44    TPro  6.4  /  Alb  3.7  /  TBili  0.5  /  DBili  x   /  AST  53<H>  /  ALT  18  /  AlkPhos  74  04-12    LIVER FUNCTIONS - ( 12 Apr 2021 15:03 )  Alb: 3.7 g/dL / Pro: 6.4 g/dL / ALK PHOS: 74 U/L / ALT: 18 U/L / AST: 53 U/L / GGT: x           PT/INR - ( 13 Apr 2021 04:57 )   PT: 12.4 sec;   INR: 1.04 ratio         PTT - ( 13 Apr 2021 04:57 )  PTT:27.3 sec    CAPILLARY BLOOD GLUCOSE          RADIOLOGY & ADDITIONAL TESTS:

## 2021-04-15 ENCOUNTER — TRANSCRIPTION ENCOUNTER (OUTPATIENT)
Age: 86
End: 2021-04-15

## 2021-04-15 DIAGNOSIS — D62 ACUTE POSTHEMORRHAGIC ANEMIA: ICD-10-CM

## 2021-04-15 LAB
ANION GAP SERPL CALC-SCNC: 8 MMOL/L — SIGNIFICANT CHANGE UP (ref 5–17)
BLD GP AB SCN SERPL QL: NEGATIVE — SIGNIFICANT CHANGE UP
BUN SERPL-MCNC: 23 MG/DL — SIGNIFICANT CHANGE UP (ref 7–23)
CALCIUM SERPL-MCNC: 7.5 MG/DL — LOW (ref 8.4–10.5)
CHLORIDE SERPL-SCNC: 106 MMOL/L — SIGNIFICANT CHANGE UP (ref 96–108)
CO2 SERPL-SCNC: 24 MMOL/L — SIGNIFICANT CHANGE UP (ref 22–31)
CREAT SERPL-MCNC: 1.06 MG/DL — SIGNIFICANT CHANGE UP (ref 0.5–1.3)
GLUCOSE SERPL-MCNC: 102 MG/DL — HIGH (ref 70–99)
HCT VFR BLD CALC: 23.5 % — LOW (ref 34.5–45)
HGB BLD-MCNC: 7.7 G/DL — LOW (ref 11.5–15.5)
MCHC RBC-ENTMCNC: 30.4 PG — SIGNIFICANT CHANGE UP (ref 27–34)
MCHC RBC-ENTMCNC: 32.8 GM/DL — SIGNIFICANT CHANGE UP (ref 32–36)
MCV RBC AUTO: 92.9 FL — SIGNIFICANT CHANGE UP (ref 80–100)
NRBC # BLD: 0 /100 WBCS — SIGNIFICANT CHANGE UP (ref 0–0)
PLATELET # BLD AUTO: 116 K/UL — LOW (ref 150–400)
POTASSIUM SERPL-MCNC: 3.5 MMOL/L — SIGNIFICANT CHANGE UP (ref 3.5–5.3)
POTASSIUM SERPL-SCNC: 3.5 MMOL/L — SIGNIFICANT CHANGE UP (ref 3.5–5.3)
RBC # BLD: 2.53 M/UL — LOW (ref 3.8–5.2)
RBC # FLD: 14 % — SIGNIFICANT CHANGE UP (ref 10.3–14.5)
RH IG SCN BLD-IMP: POSITIVE — SIGNIFICANT CHANGE UP
SODIUM SERPL-SCNC: 138 MMOL/L — SIGNIFICANT CHANGE UP (ref 135–145)
WBC # BLD: 5.19 K/UL — SIGNIFICANT CHANGE UP (ref 3.8–10.5)
WBC # FLD AUTO: 5.19 K/UL — SIGNIFICANT CHANGE UP (ref 3.8–10.5)

## 2021-04-15 RX ADMIN — Medication 975 MILLIGRAM(S): at 17:09

## 2021-04-15 RX ADMIN — ONDANSETRON 4 MILLIGRAM(S): 8 TABLET, FILM COATED ORAL at 18:13

## 2021-04-15 RX ADMIN — OXYCODONE HYDROCHLORIDE 5 MILLIGRAM(S): 5 TABLET ORAL at 18:07

## 2021-04-15 RX ADMIN — OXYCODONE HYDROCHLORIDE 5 MILLIGRAM(S): 5 TABLET ORAL at 17:04

## 2021-04-15 RX ADMIN — Medication 81 MILLIGRAM(S): at 12:55

## 2021-04-15 RX ADMIN — Medication 975 MILLIGRAM(S): at 00:35

## 2021-04-15 RX ADMIN — CLOPIDOGREL BISULFATE 75 MILLIGRAM(S): 75 TABLET, FILM COATED ORAL at 12:55

## 2021-04-15 RX ADMIN — SENNA PLUS 2 TABLET(S): 8.6 TABLET ORAL at 21:19

## 2021-04-15 RX ADMIN — OXYCODONE HYDROCHLORIDE 5 MILLIGRAM(S): 5 TABLET ORAL at 10:53

## 2021-04-15 RX ADMIN — Medication 975 MILLIGRAM(S): at 09:18

## 2021-04-15 RX ADMIN — Medication 1 TABLET(S): at 13:21

## 2021-04-15 RX ADMIN — OXYCODONE HYDROCHLORIDE 5 MILLIGRAM(S): 5 TABLET ORAL at 11:26

## 2021-04-15 RX ADMIN — ATORVASTATIN CALCIUM 20 MILLIGRAM(S): 80 TABLET, FILM COATED ORAL at 21:19

## 2021-04-15 RX ADMIN — Medication 975 MILLIGRAM(S): at 10:47

## 2021-04-15 RX ADMIN — POLYETHYLENE GLYCOL 3350 17 GRAM(S): 17 POWDER, FOR SOLUTION ORAL at 12:55

## 2021-04-15 RX ADMIN — Medication 975 MILLIGRAM(S): at 16:55

## 2021-04-15 RX ADMIN — FAMOTIDINE 20 MILLIGRAM(S): 10 INJECTION INTRAVENOUS at 12:55

## 2021-04-15 NOTE — DISCHARGE NOTE PROVIDER - HOSPITAL COURSE
Reason for Admission: Left periprosthetic distal femur fracture  History of Present Illness:   Patient is a 89yFemale home ambulator with assistive devices (walker) who presents to [facility] ED w/ a c/o of left leg pain after a MF while at home. Patient states she was changing the bed sheets at home when she believes that she tripped on the leg of the bed and fell to the floor, denies preceding CP/SOB/palpitations/N/v/Headache/confusion/dizziness/weakness/fatigue. Unsure of Head trauma/LOC. States inability to walk immediately following the injury. Denies any numbness or tingling. Denies having any other significant pain elsewhere. Has TKA on the left but is unable to provide history and unaware who performed procedure. No other orthopedic concerns at this time.    PMH:  Hypertension  Hyperlipidemia  Coronary stent occlusion, initial encounter  penicillin (Unknown)     Review of Systems:  Other Review of Systems: All other review of systems negative, except as noted in HPI    Allergies and Intolerances:        Allergies:  	penicillin: Drug, Unknown    PAST MEDICAL HISTORY:  Coronary stent occlusion, initial encounter   Hyperlipidemia   Hypertension.     PAST SURGICAL HISTORY:  No significant past surgical history.   No pertinent family history in first degree relatives.   No Pertinent Family History in first degree relatives of: None.    This is a 89 year old Female admitted to [facility] on 4/12/21 after a mechanical fall.  Patient found to have a L Femur Periprosthetic fracture.  Evaluated and cleared by Medicine for operative procedure.  On 4/13/21, patient underwent an uncomplicated ORIF.  Evaluated and treated by PT, recommended for Subacute Rehab.  Patient was anemic during hospital stay from acute postop blood loss anemia secondary to perioperative blood loss.  Patient was stabilized with transfusion of PRBC's.  Remain of hospital stay unremarkable, and patient discharged to Subacute Rehab when bed available. Reason for Admission: Left periprosthetic distal femur fracture  History of Present Illness:   Patient is a 89yFemale home ambulator with assistive devices (walker) who presents to [facility] ED w/ a c/o of left leg pain after a MF while at home. Patient states she was changing the bed sheets at home when she believes that she tripped on the leg of the bed and fell to the floor, denies preceding CP/SOB/palpitations/N/v/Headache/confusion/dizziness/weakness/fatigue. Unsure of Head trauma/LOC. States inability to walk immediately following the injury. Denies any numbness or tingling. Denies having any other significant pain elsewhere. Has TKA on the left but is unable to provide history and unaware who performed procedure. No other orthopedic concerns at this time.    PMH:  Hypertension  Hyperlipidemia  Coronary stent occlusion, initial encounter  penicillin (Unknown)     Review of Systems:  Other Review of Systems: All other review of systems negative, except as noted in HPI    Allergies and Intolerances:        Allergies:  	penicillin: Drug, Unknown    PAST MEDICAL HISTORY:  Coronary stent occlusion, initial encounter   Hyperlipidemia   Hypertension.     PAST SURGICAL HISTORY:  No significant past surgical history.   No pertinent family history in first degree relatives.   No Pertinent Family History in first degree relatives of: None.    Hospital Course:  This is a 89 year old Female admitted to [facility] on 4/12/21 after a mechanical fall.  Patient found to have a L Femur Periprosthetic fracture.  Evaluated and cleared by Medicine for operative procedure.  On 4/13/21, patient underwent an uncomplicated ORIF.  Evaluated and treated by PT, recommended for Subacute Rehab.  Patient was anemic during hospital stay from acute postop blood loss anemia secondary to perioperative blood loss.  Patient was stabilized with transfusion of PRBC's.  Remain of hospital stay unremarkable, and patient discharged to Subacute Rehab when bed available.

## 2021-04-15 NOTE — PROGRESS NOTE ADULT - SUBJECTIVE AND OBJECTIVE BOX
Ortho Progress Note    S: Patient seen and examined. No acute events overnight. Pain well controlled with current regimen. Denies lightheadedness/dizziness, CP/SOB. Tolerating diet.       O:  Physical Exam:  Gen: Laying in bed, NAD, alert and oriented.   Resp: Unlabored breathing  Ext: EHL/FHL/TA/Sol intact          + SILT DP/SP/MEDINA/Sa/Tib          +DP, extremity WWP    Vital Signs Last 24 Hrs  T(C): 36.9 (15 Apr 2021 04:55), Max: 37.1 (14 Apr 2021 14:35)  T(F): 98.4 (15 Apr 2021 04:55), Max: 98.7 (14 Apr 2021 14:35)  HR: 77 (15 Apr 2021 04:55) (74 - 83)  BP: 110/69 (15 Apr 2021 04:55) (85/48 - 110/69)  BP(mean): --  RR: 18 (15 Apr 2021 04:55) (17 - 18)  SpO2: 96% (15 Apr 2021 04:55) (96% - 98%)                          7.7    5.19  )-----------( 116      ( 15 Apr 2021 06:15 )             23.5                         7.5    7.55  )-----------( 142      ( 14 Apr 2021 12:31 )             23.0       04-15    138  |  106  |  23  ----------------------------<  102<H>  3.5   |  24  |  1.06

## 2021-04-15 NOTE — DISCHARGE NOTE PROVIDER - CARE PROVIDER_API CALL
Matthieu Mosquera  ORTHOPAEDIC SURGERY  [address] [place] [address], [address]  [place] [address]  Phone: [phone]  Fax: [phone]  Follow Up Time:

## 2021-04-15 NOTE — PROGRESS NOTE ADULT - ASSESSMENT
89 yo woman presents after a fall at home with a periprosthetic left distal femur fx s/p  Open reduction and internal fixation

## 2021-04-15 NOTE — DISCHARGE NOTE PROVIDER - NSDCCPCAREPLAN_GEN_ALL_CORE_FT
PRINCIPAL DISCHARGE DIAGNOSIS  Diagnosis: Femur fracture, left  Assessment and Plan of Treatment:

## 2021-04-15 NOTE — PROGRESS NOTE ADULT - SUBJECTIVE AND OBJECTIVE BOX
89 y/o F pmhx htn, hld, CAD on plavix remote history of b/l TKR presenting with severe L knee pain s/p fall. Patient reports that she lives by herself at home and was making her bed when she tripped over something, likely the bed sheets and fell. She states that she didn't lose consciousness but it happened so fast she doesn't remember everything that happened and isn't sure if she hit her head or not. She reports that she was able to move herself on the floor over to the phone to call her daughter to come help her. Was not able to get up on her own and daughter came to get her, noted that she was not able to get up and called EMS. Reported that her knee became very swollen and bruised quickly. Patient was brought to [facility] for further eval. Patient was found to have a left periprosthetic distal femur fx. Patient is s/p  Open reduction and internal fixation of distal femur. Patient seen now resting comfortably. Patient found to be anemic and transfused 2 units. Patient states she is feeling well. No CP or shortness of breath       MEDICATIONS  (STANDING):  acetaminophen   Tablet .. 975 milliGRAM(s) Oral every 8 hours  aspirin enteric coated 81 milliGRAM(s) Oral daily  atorvastatin 20 milliGRAM(s) Oral at bedtime  clopidogrel Tablet 75 milliGRAM(s) Oral daily  famotidine    Tablet 20 milliGRAM(s) Oral daily  hydrochlorothiazide 12.5 milliGRAM(s) Oral daily  metoprolol succinate ER 50 milliGRAM(s) Oral daily  multivitamin 1 Tablet(s) Oral daily  polyethylene glycol 3350 17 Gram(s) Oral daily  senna 2 Tablet(s) Oral at bedtime  sodium chloride 0.9%. 1000 milliLiter(s) (60 mL/Hr) IV Continuous <Continuous>  sodium chloride 0.9%. 1000 milliLiter(s) (90 mL/Hr) IV Continuous <Continuous>    MEDICATIONS  (PRN):  bisacodyl Suppository 10 milliGRAM(s) Rectal daily PRN If no bowel movement  magnesium hydroxide Suspension 30 milliLiter(s) Oral daily PRN Constipation  magnesium hydroxide Suspension 30 milliLiter(s) Oral daily PRN Constipation  melatonin 3 milliGRAM(s) Oral at bedtime PRN Insomnia  ondansetron Injectable 4 milliGRAM(s) IV Push every 6 hours PRN Nausea and/or Vomiting  oxyCODONE    IR 2.5 milliGRAM(s) Oral every 4 hours PRN Moderate Pain (4 - 6)  oxyCODONE    IR 5 milliGRAM(s) Oral every 4 hours PRN Severe Pain (7 - 10)  traMADol 50 milliGRAM(s) Oral every 12 hours PRN Mild Pain (1 - 3)          VITALS:   T(C): 36.6 (04-15-21 @ 14:45), Max: 36.9 (04-15-21 @ 00:13)  HR: 78 (04-15-21 @ 14:45) (64 - 83)  BP: 108/70 (04-15-21 @ 14:45) (85/48 - 110/69)  RR: 16 (04-15-21 @ 14:45) (16 - 18)  SpO2: 98% (04-15-21 @ 14:45) (96% - 98%)  Wt(kg): --      PHYSICAL EXAM:  GENERAL: NAD, well-groomed, well-developed  HEAD:  Atraumatic, Normocephalic  EYES: EOMI, PERRLA, conjunctiva and sclera clear  ENMT: No tonsillar erythema, exudates, or enlargement; Moist mucous membranes, Good dentition, No lesions  NECK: Supple, No JVD, Normal thyroid  NERVOUS SYSTEM:  Alert & Oriented X3, Good concentration; Motor Strength 5/5 B/L upper and lower extremities; DTRs 2+ intact and symmetric  CHEST/LUNG: Clear to percussion bilaterally; No rales, rhonchi, wheezing, or rubs  HEART: Regular rate and rhythm; No murmurs, rubs, or gallops  ABDOMEN: Soft, Nontender, Nondistended; Bowel sounds present  EXTREMITIES:  2+ Peripheral Pulses, No clubbing, cyanosis, or edema  LYMPH: No lymphadenopathy noted  SKIN: No rashes or lesions    LABS:        CBC Full  -  ( 15 Apr 2021 06:15 )  WBC Count : 5.19 K/uL  RBC Count : 2.53 M/uL  Hemoglobin : 7.7 g/dL  Hematocrit : 23.5 %  Platelet Count - Automated : 116 K/uL  Mean Cell Volume : 92.9 fl  Mean Cell Hemoglobin : 30.4 pg  Mean Cell Hemoglobin Concentration : 32.8 gm/dL  Auto Neutrophil # : x  Auto Lymphocyte # : x  Auto Monocyte # : x  Auto Eosinophil # : x  Auto Basophil # : x  Auto Neutrophil % : x  Auto Lymphocyte % : x  Auto Monocyte % : x  Auto Eosinophil % : x  Auto Basophil % : x    04-15    138  |  106  |  23  ----------------------------<  102<H>  3.5   |  24  |  1.06    Ca    7.5<L>      15 Apr 2021 06:15            CAPILLARY BLOOD GLUCOSE          RADIOLOGY & ADDITIONAL TESTS:

## 2021-04-15 NOTE — PROGRESS NOTE ADULT - ASSESSMENT
89 sp L distal femur ORIF    Neuro: Pain control  Resp: IS  GI: Regular diet, bowel reg  MSK: NWB, PT/OT  Heme: DVT PPX w/plavix and aspirin    Ortho 1337/1409

## 2021-04-15 NOTE — DISCHARGE NOTE PROVIDER - NSDCMRMEDTOKEN_GEN_ALL_CORE_FT
Aspir 81:  orally   Crestor:  orally   Dyazide:  orally   multivitamin:     Plavix:  orally   Toprol-XL:  orally   Vitamin D3:  orally    acetaminophen 325 mg oral tablet: 3 tabs orally every 8 hours X 5 days, then every 8 hours as needed for mild pain  aspirin 81 mg oral delayed release tablet: 1 tab(s) orally once a day  (Home medication)  clopidogrel 75 mg oral tablet: 1 tab(s) orally once a day  (Home medication)  famotidine 20 mg oral tablet: 1 tab(s) orally once a day  (Home medication)  hydroCHLOROthiazide 12.5 mg oral capsule: 1 cap(s) orally once a day  (Home medication)  magnesium hydroxide 8% oral suspension: 30 milliliter(s) orally once a day, As needed, Constipation  melatonin 3 mg oral tablet: 1 tab(s) orally once a day (at bedtime), As needed, Insomnia  metoprolol succinate 50 mg oral tablet, extended release: 1 tab(s) orally once a day  (Home medication)  multivitamin:     oxyCODONE 5 mg oral tablet: Half to 1 tab orally every 4-6 hours, As needed for moderate to Severe Pain  polyethylene glycol 3350 oral powder for reconstitution: 17 gram(s) orally once a day  rosuvastatin 5 mg oral tablet: 1 tab(s) orally once a day  (Home medication)  senna oral tablet: 2 tab(s) orally once a day (at bedtime)  traMADol 50 mg oral tablet: 1 tab(s) orally every 12 hours, As needed, Mild Pain (1 - 3)  Vitamin D3:  orally    3-1 commode: Dx: Left periprosthetic distal femur fracture ORIF    ELIJAH: 99 months  3-in-1 commode: Dx: s/p Left distal femur ORIF  ELIJAH: 99 months  acetaminophen 325 mg oral tablet: 3 tabs orally every 8 hours X 5 days, then every 8 hours as needed for mild pain  aspirin 81 mg oral delayed release tablet: 1 tab(s) orally once a day  (Home medication)  clopidogrel 75 mg oral tablet: 1 tab(s) orally once a day  (Home medication)  famotidine 20 mg oral tablet: 1 tab(s) orally once a day  (Home medication)  Hospital bed: Dx: Left periprosthetic distal femur fracture ORIF    ELIJAH: 99 months  hydroCHLOROthiazide 12.5 mg oral capsule: 1 cap(s) orally once a day  (Home medication)  magnesium hydroxide 8% oral suspension: 30 milliliter(s) orally once a day, As needed, Constipation  melatonin 3 mg oral tablet: 1 tab(s) orally once a day (at bedtime), As needed, Insomnia  metoprolol succinate 50 mg oral tablet, extended release: 1 tab(s) orally once a day  (Home medication)  multivitamin:     oxyCODONE 5 mg oral tablet: Half to 1 tab orally every 4-6 hours, As needed for moderate to Severe Pain  Patient lift device: Dx: Left periprosthetic distal femur fracture ORIF    ELIJAH: 99 months  polyethylene glycol 3350 oral powder for reconstitution: 17 gram(s) orally once a day  rosuvastatin 5 mg oral tablet: 1 tab(s) orally once a day  (Home medication)  senna oral tablet: 2 tab(s) orally once a day (at bedtime)  traMADol 50 mg oral tablet: 1 tab(s) orally every 12 hours, As needed, Mild Pain (1 - 3)  Transport Wheelchair: Dx: Left periprosthetic distal femur fracture ORIF    ELIJAH: 99 months  Vitamin D3:  orally    acetaminophen 325 mg oral tablet: 3 tabs orally every 8 hours X 5 days, then every 8 hours as needed for mild pain  aspirin 81 mg oral delayed release tablet: 1 tab(s) orally once a day  (Home medication)  clopidogrel 75 mg oral tablet: 1 tab(s) orally once a day  (Home medication)  famotidine 20 mg oral tablet: 1 tab(s) orally once a day  (Home medication)  hydroCHLOROthiazide 12.5 mg oral capsule: 1 cap(s) orally once a day  (Home medication)  lactulose 10 g/15 mL oral syrup: 30 milliliter(s) orally every 12 hours, As needed, constipation  magnesium hydroxide 8% oral suspension: 30 milliliter(s) orally once a day, As needed, Constipation  melatonin 3 mg oral tablet: 1 tab(s) orally once a day (at bedtime), As needed, Insomnia  metoprolol succinate 50 mg oral tablet, extended release: 1 tab(s) orally once a day  (Home medication)  multivitamin:     oxyCODONE 5 mg oral tablet: Half to 1 tab orally every 4-6 hours, As needed for moderate to Severe Pain  polyethylene glycol 3350 oral powder for reconstitution: 17 gram(s) orally once a day  rosuvastatin 5 mg oral tablet: 1 tab(s) orally once a day  (Home medication)  senna oral tablet: 2 tab(s) orally once a day (at bedtime)  traMADol 50 mg oral tablet: 1 tab(s) orally every 12 hours, As needed, Mild Pain (1 - 3)  Vitamin D3:  orally

## 2021-04-16 LAB
ANION GAP SERPL CALC-SCNC: 8 MMOL/L — SIGNIFICANT CHANGE UP (ref 5–17)
BUN SERPL-MCNC: 17 MG/DL — SIGNIFICANT CHANGE UP (ref 7–23)
CALCIUM SERPL-MCNC: 8.2 MG/DL — LOW (ref 8.4–10.5)
CHLORIDE SERPL-SCNC: 107 MMOL/L — SIGNIFICANT CHANGE UP (ref 96–108)
CO2 SERPL-SCNC: 26 MMOL/L — SIGNIFICANT CHANGE UP (ref 22–31)
CREAT SERPL-MCNC: 0.84 MG/DL — SIGNIFICANT CHANGE UP (ref 0.5–1.3)
GLUCOSE SERPL-MCNC: 97 MG/DL — SIGNIFICANT CHANGE UP (ref 70–99)
HCT VFR BLD CALC: 34.9 % — SIGNIFICANT CHANGE UP (ref 34.5–45)
HGB BLD-MCNC: 11.6 G/DL — SIGNIFICANT CHANGE UP (ref 11.5–15.5)
MCHC RBC-ENTMCNC: 30.2 PG — SIGNIFICANT CHANGE UP (ref 27–34)
MCHC RBC-ENTMCNC: 33.2 GM/DL — SIGNIFICANT CHANGE UP (ref 32–36)
MCV RBC AUTO: 90.9 FL — SIGNIFICANT CHANGE UP (ref 80–100)
NRBC # BLD: 0 /100 WBCS — SIGNIFICANT CHANGE UP (ref 0–0)
PLATELET # BLD AUTO: 145 K/UL — LOW (ref 150–400)
POTASSIUM SERPL-MCNC: 3.7 MMOL/L — SIGNIFICANT CHANGE UP (ref 3.5–5.3)
POTASSIUM SERPL-SCNC: 3.7 MMOL/L — SIGNIFICANT CHANGE UP (ref 3.5–5.3)
RBC # BLD: 3.84 M/UL — SIGNIFICANT CHANGE UP (ref 3.8–5.2)
RBC # FLD: 14.3 % — SIGNIFICANT CHANGE UP (ref 10.3–14.5)
SARS-COV-2 RNA SPEC QL NAA+PROBE: SIGNIFICANT CHANGE UP
SODIUM SERPL-SCNC: 141 MMOL/L — SIGNIFICANT CHANGE UP (ref 135–145)
WBC # BLD: 5.07 K/UL — SIGNIFICANT CHANGE UP (ref 3.8–10.5)
WBC # FLD AUTO: 5.07 K/UL — SIGNIFICANT CHANGE UP (ref 3.8–10.5)

## 2021-04-16 RX ORDER — ASPIRIN/CALCIUM CARB/MAGNESIUM 324 MG
1 TABLET ORAL
Qty: 0 | Refills: 0 | DISCHARGE
Start: 2021-04-16

## 2021-04-16 RX ORDER — CLOPIDOGREL BISULFATE 75 MG/1
1 TABLET, FILM COATED ORAL
Qty: 0 | Refills: 0 | DISCHARGE
Start: 2021-04-16

## 2021-04-16 RX ORDER — METOPROLOL TARTRATE 50 MG
1 TABLET ORAL
Qty: 0 | Refills: 0 | DISCHARGE
Start: 2021-04-16

## 2021-04-16 RX ORDER — ROSUVASTATIN CALCIUM 5 MG/1
1 TABLET ORAL
Qty: 0 | Refills: 0 | DISCHARGE

## 2021-04-16 RX ORDER — SENNA PLUS 8.6 MG/1
2 TABLET ORAL
Qty: 0 | Refills: 0 | DISCHARGE
Start: 2021-04-16

## 2021-04-16 RX ORDER — FAMOTIDINE 10 MG/ML
1 INJECTION INTRAVENOUS
Qty: 0 | Refills: 0 | DISCHARGE
Start: 2021-04-16

## 2021-04-16 RX ORDER — POLYETHYLENE GLYCOL 3350 17 G/17G
17 POWDER, FOR SOLUTION ORAL
Qty: 0 | Refills: 0 | DISCHARGE
Start: 2021-04-16

## 2021-04-16 RX ORDER — OXYCODONE HYDROCHLORIDE 5 MG/1
1 TABLET ORAL
Qty: 0 | Refills: 0 | DISCHARGE
Start: 2021-04-16

## 2021-04-16 RX ORDER — ACETAMINOPHEN 500 MG
3 TABLET ORAL
Qty: 0 | Refills: 0 | DISCHARGE
Start: 2021-04-16

## 2021-04-16 RX ORDER — MAGNESIUM HYDROXIDE 400 MG/1
30 TABLET, CHEWABLE ORAL
Qty: 0 | Refills: 0 | DISCHARGE
Start: 2021-04-16

## 2021-04-16 RX ORDER — LANOLIN ALCOHOL/MO/W.PET/CERES
1 CREAM (GRAM) TOPICAL
Qty: 0 | Refills: 0 | DISCHARGE
Start: 2021-04-16

## 2021-04-16 RX ORDER — TRAMADOL HYDROCHLORIDE 50 MG/1
1 TABLET ORAL
Qty: 0 | Refills: 0 | DISCHARGE
Start: 2021-04-16

## 2021-04-16 RX ADMIN — Medication 975 MILLIGRAM(S): at 17:57

## 2021-04-16 RX ADMIN — Medication 3 MILLIGRAM(S): at 21:38

## 2021-04-16 RX ADMIN — MAGNESIUM HYDROXIDE 30 MILLILITER(S): 400 TABLET, CHEWABLE ORAL at 21:39

## 2021-04-16 RX ADMIN — Medication 1 TABLET(S): at 12:08

## 2021-04-16 RX ADMIN — FAMOTIDINE 20 MILLIGRAM(S): 10 INJECTION INTRAVENOUS at 12:08

## 2021-04-16 RX ADMIN — Medication 975 MILLIGRAM(S): at 10:28

## 2021-04-16 RX ADMIN — Medication 975 MILLIGRAM(S): at 17:27

## 2021-04-16 RX ADMIN — Medication 12.5 MILLIGRAM(S): at 05:43

## 2021-04-16 RX ADMIN — POLYETHYLENE GLYCOL 3350 17 GRAM(S): 17 POWDER, FOR SOLUTION ORAL at 12:09

## 2021-04-16 RX ADMIN — Medication 81 MILLIGRAM(S): at 12:08

## 2021-04-16 RX ADMIN — SENNA PLUS 2 TABLET(S): 8.6 TABLET ORAL at 21:38

## 2021-04-16 RX ADMIN — Medication 50 MILLIGRAM(S): at 05:43

## 2021-04-16 RX ADMIN — ATORVASTATIN CALCIUM 20 MILLIGRAM(S): 80 TABLET, FILM COATED ORAL at 21:38

## 2021-04-16 RX ADMIN — Medication 975 MILLIGRAM(S): at 10:58

## 2021-04-16 RX ADMIN — CLOPIDOGREL BISULFATE 75 MILLIGRAM(S): 75 TABLET, FILM COATED ORAL at 12:08

## 2021-04-16 NOTE — PROGRESS NOTE ADULT - SUBJECTIVE AND OBJECTIVE BOX
89 y/o F pmhx htn, hld, CAD on plavix remote history of b/l TKR presenting with severe L knee pain s/p fall. Patient reports that she lives by herself at home and was making her bed when she tripped over something, likely the bed sheets and fell. She states that she didn't lose consciousness but it happened so fast she doesn't remember everything that happened and isn't sure if she hit her head or not. She reports that she was able to move herself on the floor over to the phone to call her daughter to come help her. Was not able to get up on her own and daughter came to get her, noted that she was not able to get up and called EMS. Reported that her knee became very swollen and bruised quickly. Patient was brought to [facility] for further eval. Patient was found to have a left periprosthetic distal femur fx. Patient is s/p  Open reduction and internal fixation of distal femur. Patient seen now resting comfortably. Patient found to be anemic and transfused 2 units. Patient states she is feeling well. H&H responded appropriately. Patient states she is feeling well       MEDICATIONS  (STANDING):  aspirin enteric coated 81 milliGRAM(s) Oral daily  atorvastatin 20 milliGRAM(s) Oral at bedtime  clopidogrel Tablet 75 milliGRAM(s) Oral daily  famotidine    Tablet 20 milliGRAM(s) Oral daily  hydrochlorothiazide 12.5 milliGRAM(s) Oral daily  metoprolol succinate ER 50 milliGRAM(s) Oral daily  multivitamin 1 Tablet(s) Oral daily  polyethylene glycol 3350 17 Gram(s) Oral daily  senna 2 Tablet(s) Oral at bedtime  sodium chloride 0.9%. 1000 milliLiter(s) (60 mL/Hr) IV Continuous <Continuous>  sodium chloride 0.9%. 1000 milliLiter(s) (90 mL/Hr) IV Continuous <Continuous>    MEDICATIONS  (PRN):  bisacodyl Suppository 10 milliGRAM(s) Rectal daily PRN If no bowel movement  magnesium hydroxide Suspension 30 milliLiter(s) Oral daily PRN Constipation  magnesium hydroxide Suspension 30 milliLiter(s) Oral daily PRN Constipation  melatonin 3 milliGRAM(s) Oral at bedtime PRN Insomnia  ondansetron Injectable 4 milliGRAM(s) IV Push every 6 hours PRN Nausea and/or Vomiting  oxyCODONE    IR 2.5 milliGRAM(s) Oral every 4 hours PRN Moderate Pain (4 - 6)  oxyCODONE    IR 5 milliGRAM(s) Oral every 4 hours PRN Severe Pain (7 - 10)  traMADol 50 milliGRAM(s) Oral every 12 hours PRN Mild Pain (1 - 3)          VITALS:   T(C): 37 (04-16-21 @ 20:30), Max: 37 (04-16-21 @ 20:30)  HR: 66 (04-16-21 @ 20:30) (60 - 86)  BP: 121/69 (04-16-21 @ 20:30) (105/68 - 131/75)  RR: 18 (04-16-21 @ 20:30) (18 - 18)  SpO2: 97% (04-16-21 @ 20:30) (96% - 98%)  Wt(kg): --    PHYSICAL EXAM:  GENERAL: NAD, well-groomed, well-developed  HEAD:  Atraumatic, Normocephalic  EYES: EOMI, PERRLA, conjunctiva and sclera clear  ENMT: No tonsillar erythema, exudates, or enlargement; Moist mucous membranes, Good dentition, No lesions  NECK: Supple, No JVD, Normal thyroid  NERVOUS SYSTEM:  Alert & Oriented X3, Good concentration; Motor Strength 5/5 B/L upper and lower extremities; DTRs 2+ intact and symmetric  CHEST/LUNG: Clear to percussion bilaterally; No rales, rhonchi, wheezing, or rubs  HEART: Regular rate and rhythm; No murmurs, rubs, or gallops  ABDOMEN: Soft, Nontender, Nondistended; Bowel sounds present  EXTREMITIES:  2+ Peripheral Pulses, No clubbing, cyanosis, or edema  LYMPH: No lymphadenopathy noted  SKIN: No rashes or lesions    LABS:        CBC Full  -  ( 16 Apr 2021 06:46 )  WBC Count : 5.07 K/uL  RBC Count : 3.84 M/uL  Hemoglobin : 11.6 g/dL  Hematocrit : 34.9 %  Platelet Count - Automated : 145 K/uL  Mean Cell Volume : 90.9 fl  Mean Cell Hemoglobin : 30.2 pg  Mean Cell Hemoglobin Concentration : 33.2 gm/dL  Auto Neutrophil # : x  Auto Lymphocyte # : x  Auto Monocyte # : x  Auto Eosinophil # : x  Auto Basophil # : x  Auto Neutrophil % : x  Auto Lymphocyte % : x  Auto Monocyte % : x  Auto Eosinophil % : x  Auto Basophil % : x    04-16    141  |  107  |  17  ----------------------------<  97  3.7   |  26  |  0.84    Ca    8.2<L>      16 Apr 2021 06:46            CAPILLARY BLOOD GLUCOSE          RADIOLOGY & ADDITIONAL TESTS:

## 2021-04-16 NOTE — PROGRESS NOTE ADULT - PROBLEM SELECTOR PLAN 1
continue physical therapy as tolerated  PO  as tolerated  Pain meds as needed  DVT and GI prophylaxis

## 2021-04-16 NOTE — PROGRESS NOTE ADULT - PROBLEM SELECTOR PLAN 4
transfuse as needed   trend H&H  follow for fluid overload
Patient received 2 units of PRBC  continue to monitor H&H  follow for fluid overload

## 2021-04-16 NOTE — PROGRESS NOTE ADULT - PROBLEM SELECTOR PLAN 2
continue HCTZ and metoprolol  BP has been well controlled  will continue to monitor and adjust meds as needed

## 2021-04-16 NOTE — PROGRESS NOTE ADULT - SUBJECTIVE AND OBJECTIVE BOX
Patient resting without complaints.  No chest pain, SOB, N/V.    T(C): 36.7 (04-16-21 @ 04:57), Max: 36.7 (04-15-21 @ 10:27)  HR: 86 (04-16-21 @ 04:57) (64 - 86)  BP: 131/75 (04-16-21 @ 04:57) (105/68 - 133/77)  RR: 18 (04-16-21 @ 04:57) (16 - 18)  SpO2: 97% (04-16-21 @ 04:57) (96% - 98%)      Exam:  Alert and Oriented, No Acute Distress  Lower Extremities:  LLE: Dressing C/D/I, ACE compressive bandage in place, DP2+, +DF, +PF, Toes warm and mobile                            7.7    5.19  )-----------( 116      ( 15 Apr 2021 06:15 )             23.5    04-15    138  |  106  |  23  ----------------------------<  102<H>  3.5   |  24  |  1.06    Ca    7.5<L>      15 Apr 2021 06:15

## 2021-04-16 NOTE — PROGRESS NOTE ADULT - PROBLEM SELECTOR PLAN 3
Pain meds as needed  follow for oversedation  would start tramadol for pain  GI regime to prevent constipation

## 2021-04-16 NOTE — PROGRESS NOTE ADULT - ASSESSMENT
A/P: 89 y/o F POD#3 s/p L distal Femur ORIF      DVT ppx- Plavix, ASA 81mg PO daily  LLE NWB  Pain management prn  GI ppx  Discharge planning to [facility] labs  D/W attending      KIRAN Taylor  Orthopedic Surgery  [phone]

## 2021-04-16 NOTE — CHART NOTE - NSCHARTNOTEFT_GEN_A_CORE
Based on patients ongoing issues with deconditioning and generalized weakness secondary to patients diagnosis of Left periprosthetic distal femur open reduction internal fixation and non weight bearing status, patient requires patient lift device to transfer from bed to chair.       Devyn Montana PA-C  Orthopaedic Surgery  Team pager [phone]  [phone]

## 2021-04-16 NOTE — CHART NOTE - NSCHARTNOTEFT_GEN_A_CORE
Due to the patients deconditioning and generalized weakness secondary to left periprosthetic distal femur fracture open reduction internal fixation. Patient will require a standard wheelchair with elevating leg rests. This is necessary to achieve daily tasks and therapies which cannot be achieved with the use of a can or rolling walker. Patient and family are in agreement with wheelchair use at home and assistance will be provided if needed.    Devyn Montana PA-C  Orthopaedic Surgery  Team pager [phone]  [phone]

## 2021-04-16 NOTE — CHART NOTE - NSCHARTNOTEFT_GEN_A_CORE
Based on patients ongoing issues with deconditioning and generalized weakness secondary to the patients diagnosis of left periprosthetic distal femur fracture open reduction internal fixation. Patient will require a semi electric hospital bed. This is necessary to achieve positioning, elevation and head of bed to be elevated at least 30 degrees most of the time. bed pillows and wedges have been tried and ruled out.     Devyn Montana PA-C  Orthopaedic Surgery  Team pager [phone]  [phone]

## 2021-04-16 NOTE — CHART NOTE - NSCHARTNOTEFT_GEN_A_CORE
Ortho PA Note      Informed by - Rehan- patients daughter wishes to take patient home with 24 hour aide  as recommended to her by Dr. Mosquera as per daughter.    and ortho team awaiting DME recommendations from PT.  Spoke with daughter Marta- [phone]- and reassured her patient not being discharged by ortho team   to rehab.         KIRAN Taylor  Orthopedic Surgery  [phone]

## 2021-04-16 NOTE — PROGRESS NOTE ADULT - PROBLEM SELECTOR PROBLEM 1
Femur fracture, left
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
Femur fracture, left

## 2021-04-17 LAB
ANION GAP SERPL CALC-SCNC: 10 MMOL/L — SIGNIFICANT CHANGE UP (ref 5–17)
BUN SERPL-MCNC: 17 MG/DL — SIGNIFICANT CHANGE UP (ref 7–23)
CALCIUM SERPL-MCNC: 8.7 MG/DL — SIGNIFICANT CHANGE UP (ref 8.4–10.5)
CHLORIDE SERPL-SCNC: 104 MMOL/L — SIGNIFICANT CHANGE UP (ref 96–108)
CO2 SERPL-SCNC: 26 MMOL/L — SIGNIFICANT CHANGE UP (ref 22–31)
CREAT SERPL-MCNC: 0.83 MG/DL — SIGNIFICANT CHANGE UP (ref 0.5–1.3)
GLUCOSE SERPL-MCNC: 95 MG/DL — SIGNIFICANT CHANGE UP (ref 70–99)
HCT VFR BLD CALC: 35.4 % — SIGNIFICANT CHANGE UP (ref 34.5–45)
HGB BLD-MCNC: 11.4 G/DL — LOW (ref 11.5–15.5)
MCHC RBC-ENTMCNC: 29.6 PG — SIGNIFICANT CHANGE UP (ref 27–34)
MCHC RBC-ENTMCNC: 32.2 GM/DL — SIGNIFICANT CHANGE UP (ref 32–36)
MCV RBC AUTO: 91.9 FL — SIGNIFICANT CHANGE UP (ref 80–100)
NRBC # BLD: 0 /100 WBCS — SIGNIFICANT CHANGE UP (ref 0–0)
PLATELET # BLD AUTO: 181 K/UL — SIGNIFICANT CHANGE UP (ref 150–400)
POTASSIUM SERPL-MCNC: 3.6 MMOL/L — SIGNIFICANT CHANGE UP (ref 3.5–5.3)
POTASSIUM SERPL-SCNC: 3.6 MMOL/L — SIGNIFICANT CHANGE UP (ref 3.5–5.3)
RBC # BLD: 3.85 M/UL — SIGNIFICANT CHANGE UP (ref 3.8–5.2)
RBC # FLD: 14.5 % — SIGNIFICANT CHANGE UP (ref 10.3–14.5)
SODIUM SERPL-SCNC: 140 MMOL/L — SIGNIFICANT CHANGE UP (ref 135–145)
WBC # BLD: 5.16 K/UL — SIGNIFICANT CHANGE UP (ref 3.8–10.5)
WBC # FLD AUTO: 5.16 K/UL — SIGNIFICANT CHANGE UP (ref 3.8–10.5)

## 2021-04-17 RX ORDER — LACTULOSE 10 G/15ML
20 SOLUTION ORAL EVERY 12 HOURS
Refills: 0 | Status: DISCONTINUED | OUTPATIENT
Start: 2021-04-17 | End: 2021-04-19

## 2021-04-17 RX ADMIN — OXYCODONE HYDROCHLORIDE 5 MILLIGRAM(S): 5 TABLET ORAL at 11:20

## 2021-04-17 RX ADMIN — CLOPIDOGREL BISULFATE 75 MILLIGRAM(S): 75 TABLET, FILM COATED ORAL at 11:21

## 2021-04-17 RX ADMIN — ATORVASTATIN CALCIUM 20 MILLIGRAM(S): 80 TABLET, FILM COATED ORAL at 21:26

## 2021-04-17 RX ADMIN — Medication 12.5 MILLIGRAM(S): at 05:52

## 2021-04-17 RX ADMIN — SENNA PLUS 2 TABLET(S): 8.6 TABLET ORAL at 21:25

## 2021-04-17 RX ADMIN — TRAMADOL HYDROCHLORIDE 50 MILLIGRAM(S): 50 TABLET ORAL at 05:55

## 2021-04-17 RX ADMIN — TRAMADOL HYDROCHLORIDE 50 MILLIGRAM(S): 50 TABLET ORAL at 06:25

## 2021-04-17 RX ADMIN — Medication 1 TABLET(S): at 11:19

## 2021-04-17 RX ADMIN — FAMOTIDINE 20 MILLIGRAM(S): 10 INJECTION INTRAVENOUS at 11:21

## 2021-04-17 RX ADMIN — POLYETHYLENE GLYCOL 3350 17 GRAM(S): 17 POWDER, FOR SOLUTION ORAL at 11:21

## 2021-04-17 RX ADMIN — Medication 50 MILLIGRAM(S): at 05:52

## 2021-04-17 RX ADMIN — Medication 81 MILLIGRAM(S): at 11:20

## 2021-04-17 RX ADMIN — OXYCODONE HYDROCHLORIDE 5 MILLIGRAM(S): 5 TABLET ORAL at 12:41

## 2021-04-17 NOTE — PROGRESS NOTE ADULT - ASSESSMENT
Impression: Stable       Plan:   Continue present treatment                 Out of bed, ambulate, non-weight bearing L LE                 Physical therapy follow up                 Continue to monitor                    Devyn Montana PA-C  Orthopaedic Surgery  Team pager [phone]  [phone]

## 2021-04-17 NOTE — PROGRESS NOTE ADULT - SUBJECTIVE AND OBJECTIVE BOX
· Subjective and Objective:   89 y/o F pmhx htn, hld, CAD on plavix remote history of b/l TKR presenting with severe L knee pain s/p fall. Patient reports that she lives by herself at home and was making her bed when she tripped over something, likely the bed sheets and fell. She states that she didn't lose consciousness but it happened so fast she doesn't remember everything that happened and isn't sure if she hit her head or not. She reports that she was able to move herself on the floor over to the phone to call her daughter to come help her. Was not able to get up on her own and daughter came to get her, noted that she was not able to get up and called EMS. Reported that her knee became very swollen and bruised quickly. Patient was brought to [facility] for further eval. Patient was found to have a left periprosthetic distal femur fx. Patient is s/p  Open reduction and internal fixation of distal femur. Patient seen now resting comfortably. Patient found to be anemic and transfused 2 units. Patient states she is feeling well. H&H responded appropriately. Patient states she is feeling well     PAST MEDICAL & SURGICAL HISTORY:  Hypertension    Hyperlipidemia    Coronary stent occlusion, initial encounter    No significant past surgical history    MEDICATIONS  (STANDING):  aspirin enteric coated 81 milliGRAM(s) Oral daily  atorvastatin 20 milliGRAM(s) Oral at bedtime  clopidogrel Tablet 75 milliGRAM(s) Oral daily  famotidine    Tablet 20 milliGRAM(s) Oral daily  hydrochlorothiazide 12.5 milliGRAM(s) Oral daily  metoprolol succinate ER 50 milliGRAM(s) Oral daily  multivitamin 1 Tablet(s) Oral daily  polyethylene glycol 3350 17 Gram(s) Oral daily  senna 2 Tablet(s) Oral at bedtime  sodium chloride 0.9%. 1000 milliLiter(s) (60 mL/Hr) IV Continuous <Continuous>  sodium chloride 0.9%. 1000 milliLiter(s) (90 mL/Hr) IV Continuous <Continuous>    MEDICATIONS  (PRN):  bisacodyl Suppository 10 milliGRAM(s) Rectal daily PRN If no bowel movement  lactulose Syrup 20 Gram(s) Oral every 12 hours PRN constipation  magnesium hydroxide Suspension 30 milliLiter(s) Oral daily PRN Constipation  magnesium hydroxide Suspension 30 milliLiter(s) Oral daily PRN Constipation  melatonin 3 milliGRAM(s) Oral at bedtime PRN Insomnia  ondansetron Injectable 4 milliGRAM(s) IV Push every 6 hours PRN Nausea and/or Vomiting  oxyCODONE    IR 2.5 milliGRAM(s) Oral every 4 hours PRN Moderate Pain (4 - 6)  oxyCODONE    IR 5 milliGRAM(s) Oral every 4 hours PRN Severe Pain (7 - 10)  traMADol 50 milliGRAM(s) Oral every 12 hours PRN Mild Pain (1 - 3)      Vital Signs Last 24 Hrs  T(C): 37.1 (17 Apr 2021 16:15), Max: 37.1 (17 Apr 2021 16:15)  T(F): 98.8 (17 Apr 2021 16:15), Max: 98.8 (17 Apr 2021 16:15)  HR: 71 (17 Apr 2021 16:15) (64 - 72)  BP: 145/85 (17 Apr 2021 16:15) (99/65 - 145/85)  BP(mean): --  RR: 18 (17 Apr 2021 16:15) (16 - 18)  SpO2: 99% (17 Apr 2021 16:15) (96% - 99%)    PHYSICAL EXAM:  GENERAL: NAD, well-groomed, well-developed  HEAD:  Atraumatic, Normocephalic  EYES: EOMI, PERRLA, conjunctiva and sclera clear  ENMT: No tonsillar erythema, exudates, or enlargement; Moist mucous membranes, Good dentition, No lesions  NECK: Supple, No JVD, Normal thyroid  NERVOUS SYSTEM:  Alert & Oriented X3, Good concentration; Motor Strength 5/5 B/L upper and lower extremities; DTRs 2+ intact and symmetric  CHEST/LUNG: Clear to percussion bilaterally; No rales, rhonchi, wheezing, or rubs  HEART: Regular rate and rhythm; No murmurs, rubs, or gallops  ABDOMEN: Soft, Nontender, Nondistended; Bowel sounds present  EXTREMITIES:  2+ Peripheral Pulses, No clubbing, cyanosis, or edema incision clean and dry  LYMPH: No lymphadenopathy noted    SKIN: No rashes or lesions          CBC Full  -  ( 17 Apr 2021 07:00 )  WBC Count : 5.16 K/uL  RBC Count : 3.85 M/uL  Hemoglobin : 11.4 g/dL  Hematocrit : 35.4 %  Platelet Count - Automated : 181 K/uL  Mean Cell Volume : 91.9 fl  Mean Cell Hemoglobin : 29.6 pg  Mean Cell Hemoglobin Concentration : 32.2 gm/dL  Auto Neutrophil # : x  Auto Lymphocyte # : x  Auto Monocyte # : x  Auto Eosinophil # : x  Auto Basophil # : x  Auto Neutrophil % : x  Auto Lymphocyte % : x  Auto Monocyte % : x  Auto Eosinophil % : x  Auto Basophil % : x    04-17    140  |  104  |  17  ----------------------------<  95  3.6   |  26  |  0.83    Ca    8.7      17 Apr 2021 07:00

## 2021-04-17 NOTE — PROGRESS NOTE ADULT - SUBJECTIVE AND OBJECTIVE BOX
ORTHO  Patient is a 89y old  Female who presents with a chief complaint of Left periprosthetic distal femur fracture (16 Apr 2021 21:48)    Pt. resting without complaint    VS-  T(C): 36.8 (04-17-21 @ 04:55), Max: 37 (04-16-21 @ 20:30)  HR: 66 (04-17-21 @ 04:55) (60 - 73)  BP: 128/78 (04-17-21 @ 04:55) (105/68 - 128/78)  RR: 16 (04-17-21 @ 04:55) (16 - 18)  SpO2: 98% (04-17-21 @ 04:55) (96% - 98%)  Wt(kg): --    M.S. A&O  Extremity- Left LE- dressing C/D/I  Neuro-              Motor- (+) Ankle- DF/PF              Sensation- grossly intact to light touch              Calves- soft, nontender- PAS                               11.6   5.07  )-----------( 145      ( 16 Apr 2021 06:46 )             34.9     04-16    141  |  107  |  17  ----------------------------<  97  3.7   |  26  |  0.84    Ca    8.2<L>      16 Apr 2021 06:46

## 2021-04-17 NOTE — PROGRESS NOTE ADULT - ASSESSMENT
Assessment and Plan:   · Assessment	  89 yo woman presents after a fall at home with a periprosthetic left distal femur fx s/p  Open reduction and internal fixation       Problem/Plan - 1:  ·  Problem: Femur fracture, left.  Plan: continue physical therapy as tolerated  PO  as tolerated  Pain meds as needed  DVT and GI prophylaxis.      Problem/Plan - 2:  ·  Problem: Hypertension.  Plan: continue HCTZ and metoprolol  BP has been well controlled  will continue to monitor and adjust meds as needed.      Problem/Plan - 3:  ·  Problem: Pain.  Plan: Pain meds as needed  follow for oversedation  would start tramadol for pain  GI regime to prevent constipation.      Problem/Plan - 4:  ·  Problem: Acute blood loss anemia (ABLA).  Plan: transfuse as needed   trend H&H  follow for fluid overload.     Attestation Statements:    Attestation Statements:  Time-based billing (NON-critical care).     35 minutes spent on total encounter; more than 50% of the visit was spent counseling and / or coordinating care by the attending physician.  The necessity of the time spent during the encounter on this date of service was due to: Discussion of plan of care    Vance Jon MD

## 2021-04-18 LAB — SARS-COV-2 RNA SPEC QL NAA+PROBE: SIGNIFICANT CHANGE UP

## 2021-04-18 RX ADMIN — Medication 12.5 MILLIGRAM(S): at 05:41

## 2021-04-18 RX ADMIN — POLYETHYLENE GLYCOL 3350 17 GRAM(S): 17 POWDER, FOR SOLUTION ORAL at 11:06

## 2021-04-18 RX ADMIN — TRAMADOL HYDROCHLORIDE 50 MILLIGRAM(S): 50 TABLET ORAL at 06:36

## 2021-04-18 RX ADMIN — OXYCODONE HYDROCHLORIDE 5 MILLIGRAM(S): 5 TABLET ORAL at 11:01

## 2021-04-18 RX ADMIN — Medication 81 MILLIGRAM(S): at 11:01

## 2021-04-18 RX ADMIN — OXYCODONE HYDROCHLORIDE 5 MILLIGRAM(S): 5 TABLET ORAL at 12:50

## 2021-04-18 RX ADMIN — CLOPIDOGREL BISULFATE 75 MILLIGRAM(S): 75 TABLET, FILM COATED ORAL at 11:01

## 2021-04-18 RX ADMIN — Medication 1 TABLET(S): at 11:01

## 2021-04-18 RX ADMIN — ATORVASTATIN CALCIUM 20 MILLIGRAM(S): 80 TABLET, FILM COATED ORAL at 21:41

## 2021-04-18 RX ADMIN — FAMOTIDINE 20 MILLIGRAM(S): 10 INJECTION INTRAVENOUS at 11:01

## 2021-04-18 RX ADMIN — TRAMADOL HYDROCHLORIDE 50 MILLIGRAM(S): 50 TABLET ORAL at 07:06

## 2021-04-18 RX ADMIN — Medication 50 MILLIGRAM(S): at 05:41

## 2021-04-18 NOTE — PROGRESS NOTE ADULT - ASSESSMENT
89 yo woman presents after a fall at home with a periprosthetic left distal femur fx s/p  Open reduction and internal fixation       Problem/Plan - 1:  ·  Problem: Femur fracture, left.  Plan: continue physical therapy as tolerated  PO  as tolerated  Pain meds as needed  DVT and GI prophylaxis.      Problem/Plan - 2:  ·  Problem: Hypertension.  Plan: continue HCTZ and metoprolol  BP has been well controlled  will continue to monitor and adjust meds as needed.      Problem/Plan - 3:  ·  Problem: Pain.  Plan: Pain meds as needed  follow for oversedation  would start tramadol for pain  GI regime to prevent constipation.      Problem/Plan - 4:  ·  Problem: Acute blood loss anemia (ABLA).  Plan: transfuse as needed   trend H&H  follow for fluid overload.

## 2021-04-18 NOTE — PROGRESS NOTE ADULT - ASSESSMENT
Impression: Stable       Plan:   Continue present treatment                 Out of bed, ambulate, non-weight bearing                 Physical therapy follow up                 Continue to monitor    Devyn Montana PA-C  Orthopaedic Surgery  Team pager [phone]  [phone]

## 2021-04-18 NOTE — PROGRESS NOTE ADULT - TIME BILLING
Discussed treatment plan with patient at bedside.
Discussed treatment plan with patient at bedside.
discussion of plan of care
Discussed treatment plan with patient at bedside.

## 2021-04-18 NOTE — PROGRESS NOTE ADULT - SUBJECTIVE AND OBJECTIVE BOX
ORTHO  Patient is a 89y old  Female who presents with a chief complaint of Left periprosthetic distal femur fracture (17 Apr 2021 17:05)    Pt. resting without complaint    VS-  T(C): 36.4 (04-18-21 @ 04:36), Max: 37.1 (04-17-21 @ 16:15)  HR: 68 (04-18-21 @ 04:36) (68 - 72)  BP: 125/77 (04-18-21 @ 04:36) (99/65 - 145/85)  RR: 18 (04-18-21 @ 04:36) (18 - 18)  SpO2: 97% (04-18-21 @ 04:36) (95% - 99%)  Wt(kg): --    M.S.-  A&O  Extremity- Left LE- dressing C/D/I  Neuro-              Motor- (+) Ankle- DF/PF              Sensation- grossly intact to light touch              Calves- soft, nontender- PAS                               11.4   5.16  )-----------( 181      ( 17 Apr 2021 07:00 )             35.4     04-17    140  |  104  |  17  ----------------------------<  95  3.6   |  26  |  0.83    Ca    8.7      17 Apr 2021 07:00

## 2021-04-18 NOTE — PROGRESS NOTE ADULT - SUBJECTIVE AND OBJECTIVE BOX
· Subjective and Objective:   89 y/o F pmhx htn, hld, CAD on plavix remote history of b/l TKR presenting with severe L knee pain s/p fall. Patient reports that she lives by herself at home and was making her bed when she tripped over something, likely the bed sheets and fell. She states that she didn't lose consciousness but it happened so fast she doesn't remember everything that happened and isn't sure if she hit her head or not. She reports that she was able to move herself on the floor over to the phone to call her daughter to come help her. Was not able to get up on her own and daughter came to get her, noted that she was not able to get up and called EMS. Reported that her knee became very swollen and bruised quickly. Patient was brought to [facility] for further eval. Patient was found to have a left periprosthetic distal femur fx. Patient is s/p  Open reduction and internal fixation of distal femur. Patient seen now resting comfortably. Patient found to be anemic and transfused 2 units. Patient states she is feeling well. H&H responded appropriately. Patient states she has no sob or chest pain     Vital Signs Last 24 Hrs  T(C): 36.9 (18 Apr 2021 12:52), Max: 37.1 (18 Apr 2021 00:29)  T(F): 98.5 (18 Apr 2021 12:52), Max: 98.7 (18 Apr 2021 00:29)  HR: 69 (18 Apr 2021 12:52) (62 - 72)  BP: 106/61 (18 Apr 2021 12:52) (106/61 - 136/80)  BP(mean): --  RR: 18 (18 Apr 2021 12:52) (18 - 18)  SpO2: 97% (18 Apr 2021 12:52) (95% - 97%)    PAST MEDICAL & SURGICAL HISTORY:  Hypertension    Hyperlipidemia    Coronary stent occlusion, initial encounter    No significant past surgical history    PHYSICAL EXAM:    GENERAL: NAD, well nourished and conversant  HEAD:  Atraumatic  EYES: EOM, PERRLA, conjunctiva pink and sclera white  ENT: No tonsillar erythema, exudates, or enlargement, moist mucous membranes, good dentition, no lesions  NECK: Supple, No JVD, normal thyroid, carotids with normal upstrokes and no bruits  CHEST/LUNG: Clear to auscultation bilaterally, No rales, rhonchi, wheezing, or rubs  HEART: Regular rate and rhythm, No murmurs, rubs, or gallops  ABDOMEN: Soft, nondistended, no masses, guarding, tenderness or rebound, bowel sounds present  EXTREMITIES:  2+ Peripheral Pulses, ORIF distal femur  LYMPH: No lymphadenopathy noted  SKIN: No rashes or lesions  NERVOUS SYSTEM:  Alert & Oriented X3, normal cognitive function. Motor Strength 5/5 right upper and right lower.  5/5 left upper and left lower extremities, DTRs 2+ intact and symmetric      no labs 4/18      CBC Full  -  ( 17 Apr 2021 07:00 )  WBC Count : 5.16 K/uL  RBC Count : 3.85 M/uL  Hemoglobin : 11.4 g/dL  Hematocrit : 35.4 %  Platelet Count - Automated : 181 K/uL  Mean Cell Volume : 91.9 fl  Mean Cell Hemoglobin : 29.6 pg  Mean Cell Hemoglobin Concentration : 32.2 gm/dL  Auto Neutrophil # : x  Auto Lymphocyte # : x  Auto Monocyte # : x  Auto Eosinophil # : x  Auto Basophil # : x  Auto Neutrophil % : x  Auto Lymphocyte % : x  Auto Monocyte % : x  Auto Eosinophil % : x  Auto Basophil % : x    04-17    140  |  104  |  17  ----------------------------<  95  3.6   |  26  |  0.83    Ca    8.7      17 Apr 2021 07:00

## 2021-04-19 ENCOUNTER — TRANSCRIPTION ENCOUNTER (OUTPATIENT)
Age: 86
End: 2021-04-19

## 2021-04-19 VITALS
TEMPERATURE: 98 F | HEART RATE: 64 BPM | RESPIRATION RATE: 18 BRPM | DIASTOLIC BLOOD PRESSURE: 72 MMHG | SYSTOLIC BLOOD PRESSURE: 116 MMHG | OXYGEN SATURATION: 96 %

## 2021-04-19 PROCEDURE — 85025 COMPLETE CBC W/AUTO DIFF WBC: CPT

## 2021-04-19 PROCEDURE — 73560 X-RAY EXAM OF KNEE 1 OR 2: CPT

## 2021-04-19 PROCEDURE — 86900 BLOOD TYPING SEROLOGIC ABO: CPT

## 2021-04-19 PROCEDURE — 73700 CT LOWER EXTREMITY W/O DYE: CPT

## 2021-04-19 PROCEDURE — 73562 X-RAY EXAM OF KNEE 3: CPT

## 2021-04-19 PROCEDURE — 85027 COMPLETE CBC AUTOMATED: CPT

## 2021-04-19 PROCEDURE — U0003: CPT

## 2021-04-19 PROCEDURE — 80053 COMPREHEN METABOLIC PANEL: CPT

## 2021-04-19 PROCEDURE — 86850 RBC ANTIBODY SCREEN: CPT

## 2021-04-19 PROCEDURE — 85610 PROTHROMBIN TIME: CPT

## 2021-04-19 PROCEDURE — 86769 SARS-COV-2 COVID-19 ANTIBODY: CPT

## 2021-04-19 PROCEDURE — 97161 PT EVAL LOW COMPLEX 20 MIN: CPT

## 2021-04-19 PROCEDURE — 71045 X-RAY EXAM CHEST 1 VIEW: CPT

## 2021-04-19 PROCEDURE — 97166 OT EVAL MOD COMPLEX 45 MIN: CPT

## 2021-04-19 PROCEDURE — 76000 FLUOROSCOPY <1 HR PHYS/QHP: CPT

## 2021-04-19 PROCEDURE — P9016: CPT

## 2021-04-19 PROCEDURE — C1769: CPT

## 2021-04-19 PROCEDURE — 99285 EMERGENCY DEPT VISIT HI MDM: CPT | Mod: 25

## 2021-04-19 PROCEDURE — 70450 CT HEAD/BRAIN W/O DYE: CPT

## 2021-04-19 PROCEDURE — 86923 COMPATIBILITY TEST ELECTRIC: CPT

## 2021-04-19 PROCEDURE — 85730 THROMBOPLASTIN TIME PARTIAL: CPT

## 2021-04-19 PROCEDURE — 76377 3D RENDER W/INTRP POSTPROCES: CPT

## 2021-04-19 PROCEDURE — 86901 BLOOD TYPING SEROLOGIC RH(D): CPT

## 2021-04-19 PROCEDURE — C1889: CPT

## 2021-04-19 PROCEDURE — 36430 TRANSFUSION BLD/BLD COMPNT: CPT

## 2021-04-19 PROCEDURE — C9399: CPT

## 2021-04-19 PROCEDURE — 80048 BASIC METABOLIC PNL TOTAL CA: CPT

## 2021-04-19 PROCEDURE — C1713: CPT

## 2021-04-19 PROCEDURE — 97530 THERAPEUTIC ACTIVITIES: CPT

## 2021-04-19 PROCEDURE — U0005: CPT

## 2021-04-19 PROCEDURE — 72170 X-RAY EXAM OF PELVIS: CPT

## 2021-04-19 PROCEDURE — 73552 X-RAY EXAM OF FEMUR 2/>: CPT

## 2021-04-19 PROCEDURE — 97110 THERAPEUTIC EXERCISES: CPT

## 2021-04-19 RX ORDER — LACTULOSE 10 G/15ML
30 SOLUTION ORAL
Qty: 0 | Refills: 0 | DISCHARGE
Start: 2021-04-19

## 2021-04-19 RX ADMIN — Medication 12.5 MILLIGRAM(S): at 05:42

## 2021-04-19 RX ADMIN — FAMOTIDINE 20 MILLIGRAM(S): 10 INJECTION INTRAVENOUS at 12:02

## 2021-04-19 RX ADMIN — Medication 50 MILLIGRAM(S): at 05:42

## 2021-04-19 RX ADMIN — CLOPIDOGREL BISULFATE 75 MILLIGRAM(S): 75 TABLET, FILM COATED ORAL at 12:02

## 2021-04-19 RX ADMIN — Medication 81 MILLIGRAM(S): at 12:02

## 2021-04-19 RX ADMIN — Medication 1 TABLET(S): at 12:02

## 2021-04-19 NOTE — PROGRESS NOTE ADULT - PROVIDER SPECIALTY LIST ADULT
Orthopedics
Internal Medicine
Internal Medicine
Orthopedics
Orthopedics
Internal Medicine

## 2021-04-19 NOTE — PROGRESS NOTE ADULT - ASSESSMENT
A/P: 89 y/o F POD#6 s/p L femur ORIF    DVT ppx- Plavix, ASA 81mg po daily  LLE NWB  PT  OT  Pain management prn  Discharge planning to [facility]  D/W attending        KIRAN Taylor  Orthopedic Surgery  [phone]

## 2021-04-19 NOTE — PROGRESS NOTE ADULT - SUBJECTIVE AND OBJECTIVE BOX
Patient comfortable  No complaints    T(C): 36.5 (04-19-21 @ 04:35), Max: 37.1 (04-18-21 @ 15:56)  HR: 70 (04-19-21 @ 04:35) (62 - 73)  BP: 127/79 (04-19-21 @ 04:35) (106/61 - 143/79)  RR: 18 (04-19-21 @ 04:35) (18 - 18)  SpO2: 96% (04-19-21 @ 04:35) (94% - 97%)      PHYSICAL EXAM:  NAD, Alert and oriented, follows commands, answers questions appropriately  LLE: Dressing C/D/I; compartments soft, +edema, dull sensation grossly intact to light touch; (+) DF/PF; (+) Distal Pulses; No Calf tenderness B/L, PAS     LABS:                        11.4   5.16  )-----------( 181      ( 17 Apr 2021 07:00 )             35.4     04-17    140  |  104  |  17  ----------------------------<  95  3.6   |  26  |  0.83    Ca    8.7      17 Apr 2021 07:00

## 2021-04-19 NOTE — PROGRESS NOTE ADULT - REASON FOR ADMISSION
Left periprosthetic distal femur fracture
Left periprosthetic distal femur fracture - POD#1 ORIF
Left periprosthetic distal femur fracture

## 2021-07-09 NOTE — PATIENT PROFILE ADULT - TOBACCO USE
Subjective:      Patient ID: Jesus Hammond is a 48 y.o. female. Patient is here for annual. Patient in menopause. Hx ck-2 gene. Needs MRI. Gynecologic Exam        Review of Systems    Objective:   Physical Exam  Constitutional:       General: She is not in acute distress. Appearance: Normal appearance. She is well-developed and normal weight. She is not diaphoretic. HENT:      Head: Normocephalic and atraumatic. Nose: Nose normal.      Mouth/Throat:      Mouth: Mucous membranes are moist.      Pharynx: Oropharynx is clear. Eyes:      Pupils: Pupils are equal, round, and reactive to light. Neck:      Thyroid: No thyromegaly. Cardiovascular:      Rate and Rhythm: Normal rate and regular rhythm. Heart sounds: Normal heart sounds. No murmur heard. No friction rub. No gallop. Pulmonary:      Effort: Pulmonary effort is normal. No respiratory distress. Breath sounds: Normal breath sounds. No wheezing or rales. Chest:      Breasts:         Right: Normal. No swelling, bleeding, inverted nipple, mass, nipple discharge, skin change or tenderness. Left: Normal. No swelling, bleeding, inverted nipple, mass, nipple discharge, skin change or tenderness. Abdominal:      General: Abdomen is flat. Bowel sounds are normal. There is no distension. Palpations: Abdomen is soft. There is no hepatomegaly or mass. Tenderness: There is no abdominal tenderness. There is no guarding or rebound. Hernia: No hernia is present. There is no hernia in the left inguinal area. Genitourinary:     General: Normal vulva. Exam position: Lithotomy position. Pubic Area: No rash. Labia:         Right: No rash, tenderness, lesion or injury. Left: No rash, tenderness, lesion or injury. Urethra: No prolapse, urethral pain, urethral swelling or urethral lesion. Vagina: Normal. No signs of injury and foreign body.  No vaginal discharge, erythema, tenderness or bleeding. Cervix: No cervical motion tenderness, discharge, friability, lesion, erythema, cervical bleeding or eversion. Uterus: Not deviated, not enlarged, not fixed, not tender and no uterine prolapse. Adnexa:         Right: No mass, tenderness or fullness. Left: No mass, tenderness or fullness. Rectum: Normal. Guaiac result negative. No mass, tenderness, anal fissure, external hemorrhoid or internal hemorrhoid. Normal anal tone. Comments: Normal urethral meatus, nl urethra, nl bladder. Musculoskeletal:         General: No tenderness. Normal range of motion. Cervical back: Normal range of motion and neck supple. No rigidity. Lymphadenopathy:      Cervical: No cervical adenopathy. Lower Body: No right inguinal adenopathy. No left inguinal adenopathy. Skin:     General: Skin is warm and dry. Findings: No erythema or rash. Neurological:      General: No focal deficit present. Mental Status: She is alert and oriented to person, place, and time. Deep Tendon Reflexes: Reflexes are normal and symmetric. Psychiatric:         Mood and Affect: Mood normal.         Behavior: Behavior normal.         Thought Content: Thought content normal.         Judgment: Judgment normal.         Assessment:      1. Annual  2. Menopause  3. chek 2 gene      Plan:      1. Pap, calcium, exercise, mammogram, hemocult negative. 2. Stable  3.  Breast MRI and referral to breast MD        Modesta Rice MD Former smoker

## 2021-07-15 ENCOUNTER — TRANSCRIPTION ENCOUNTER (OUTPATIENT)
Age: 86
End: 2021-07-15

## 2021-08-05 ENCOUNTER — APPOINTMENT (OUTPATIENT)
Dept: PULMONOLOGY | Facility: CLINIC | Age: 86
End: 2021-08-05
Payer: MEDICARE

## 2021-08-05 DIAGNOSIS — Z95.5 PRESENCE OF CORONARY ANGIOPLASTY IMPLANT AND GRAFT: ICD-10-CM

## 2021-08-05 DIAGNOSIS — Z80.3 FAMILY HISTORY OF MALIGNANT NEOPLASM OF BREAST: ICD-10-CM

## 2021-08-05 DIAGNOSIS — Z87.891 PERSONAL HISTORY OF NICOTINE DEPENDENCE: ICD-10-CM

## 2021-08-05 DIAGNOSIS — Z87.440 PERSONAL HISTORY OF URINARY (TRACT) INFECTIONS: ICD-10-CM

## 2021-08-05 PROCEDURE — 99213 OFFICE O/P EST LOW 20 MIN: CPT | Mod: 95

## 2021-08-30 NOTE — OCCUPATIONAL THERAPY INITIAL EVALUATION ADULT - LEVEL OF INDEPENDENCE: BED TO CHAIR, REHAB EVAL
MD aware of vitals.   Pt to CT.    Stand pivot x2 for bed to chair transfer/maximum assist (25% patients effort)

## 2021-09-10 ENCOUNTER — NON-APPOINTMENT (OUTPATIENT)
Age: 86
End: 2021-09-10

## 2021-09-14 ENCOUNTER — APPOINTMENT (OUTPATIENT)
Dept: HOME HEALTH SERVICES | Facility: HOME HEALTH | Age: 86
End: 2021-09-14
Payer: MEDICARE

## 2021-09-14 ENCOUNTER — FORM ENCOUNTER (OUTPATIENT)
Age: 86
End: 2021-09-14

## 2021-09-14 VITALS
TEMPERATURE: 96.9 F | DIASTOLIC BLOOD PRESSURE: 60 MMHG | OXYGEN SATURATION: 97 % | RESPIRATION RATE: 14 BRPM | HEART RATE: 69 BPM | SYSTOLIC BLOOD PRESSURE: 110 MMHG

## 2021-09-14 DIAGNOSIS — B02.29 OTHER POSTHERPETIC NERVOUS SYSTEM INVOLVEMENT: ICD-10-CM

## 2021-09-14 DIAGNOSIS — L23.9 ALLERGIC CONTACT DERMATITIS, UNSPECIFIED CAUSE: ICD-10-CM

## 2021-09-14 DIAGNOSIS — R82.90 UNSPECIFIED ABNORMAL FINDINGS IN URINE: ICD-10-CM

## 2021-09-14 DIAGNOSIS — R35.1 NOCTURIA: ICD-10-CM

## 2021-09-14 DIAGNOSIS — Z23 ENCOUNTER FOR IMMUNIZATION: ICD-10-CM

## 2021-09-14 DIAGNOSIS — R43.0 ANOSMIA: ICD-10-CM

## 2021-09-14 DIAGNOSIS — R60.0 LOCALIZED EDEMA: ICD-10-CM

## 2021-09-14 DIAGNOSIS — Z86.19 PERSONAL HISTORY OF OTHER INFECTIOUS AND PARASITIC DISEASES: ICD-10-CM

## 2021-09-14 DIAGNOSIS — F43.25 ADJUSTMENT DISORDER WITH MIXED DISTURBANCE OF EMOTIONS AND CONDUCT: ICD-10-CM

## 2021-09-14 DIAGNOSIS — R43.2 PARAGEUSIA: ICD-10-CM

## 2021-09-14 DIAGNOSIS — H92.09 OTALGIA, UNSPECIFIED EAR: ICD-10-CM

## 2021-09-14 DIAGNOSIS — Z87.898 PERSONAL HISTORY OF OTHER SPECIFIED CONDITIONS: ICD-10-CM

## 2021-09-14 DIAGNOSIS — Z87.19 PERSONAL HISTORY OF OTHER DISEASES OF THE DIGESTIVE SYSTEM: ICD-10-CM

## 2021-09-14 PROCEDURE — 99497 ADVNCD CARE PLAN 30 MIN: CPT

## 2021-09-14 PROCEDURE — 99345 HOME/RES VST NEW HIGH MDM 75: CPT | Mod: 25

## 2021-09-14 PROCEDURE — G0506: CPT

## 2021-09-14 RX ORDER — PNEUMOCOCCAL 13-VALENT CONJUGATE VACCINE 2.2; 2.2; 2.2; 2.2; 2.2; 4.4; 2.2; 2.2; 2.2; 2.2; 2.2; 2.2; 2.2 UG/.5ML; UG/.5ML; UG/.5ML; UG/.5ML; UG/.5ML; UG/.5ML; UG/.5ML; UG/.5ML; UG/.5ML; UG/.5ML; UG/.5ML; UG/.5ML; UG/.5ML
INJECTION, SUSPENSION INTRAMUSCULAR
Qty: 1 | Refills: 0 | Status: DISCONTINUED | COMMUNITY
Start: 2018-11-08 | End: 2021-09-14

## 2021-09-14 RX ORDER — HYDROCHLOROTHIAZIDE 12.5 MG/1
12.5 TABLET ORAL
Qty: 90 | Refills: 2 | Status: DISCONTINUED | COMMUNITY
Start: 2018-01-24 | End: 2021-09-14

## 2021-09-14 RX ORDER — CLOTRIMAZOLE AND BETAMETHASONE DIPROPIONATE 10; .5 MG/G; MG/G
1-0.05 CREAM TOPICAL TWICE DAILY
Qty: 1 | Refills: 3 | Status: DISCONTINUED | COMMUNITY
Start: 2019-10-15 | End: 2021-09-14

## 2021-09-15 PROBLEM — R43.0 LOSS OF SMELL: Status: RESOLVED | Noted: 2019-03-04 | Resolved: 2021-09-15

## 2021-09-15 PROBLEM — R60.0 PERIORBITAL EDEMA: Status: RESOLVED | Noted: 2020-03-06 | Resolved: 2021-09-15

## 2021-09-15 PROBLEM — R35.1 NOCTURIA: Status: ACTIVE | Noted: 2018-01-24

## 2021-09-15 PROBLEM — F43.25 ADJUSTMENT DISORDER WITH MIXED DISTURBANCE OF EMOTIONS AND CONDUCT IN REMISSION: Status: RESOLVED | Noted: 2020-03-06 | Resolved: 2021-09-15

## 2021-09-15 PROBLEM — L23.9 ALLERGIC DERMATITIS: Status: RESOLVED | Noted: 2019-10-15 | Resolved: 2021-09-15

## 2021-09-15 PROBLEM — H92.09 UNILATERAL OTALGIA: Status: RESOLVED | Noted: 2019-03-04 | Resolved: 2021-09-15

## 2021-09-15 PROBLEM — R43.2 LOSS OF TASTE: Status: RESOLVED | Noted: 2019-03-04 | Resolved: 2021-09-15

## 2021-09-15 PROBLEM — Z87.898 HISTORY OF DIZZINESS: Status: RESOLVED | Noted: 2018-06-29 | Resolved: 2021-09-15

## 2021-09-19 ENCOUNTER — FORM ENCOUNTER (OUTPATIENT)
Age: 86
End: 2021-09-19

## 2021-09-22 ENCOUNTER — NON-APPOINTMENT (OUTPATIENT)
Age: 86
End: 2021-09-22

## 2021-10-01 PROBLEM — Z23 NEED FOR COVID-19 VACCINE: Status: ACTIVE | Noted: 2021-10-01

## 2021-10-01 NOTE — HISTORY OF PRESENT ILLNESS
[Patient] : patient [Family Member] : family member [FreeTextEntry1] : gait instability [FreeTextEntry2] : PMH: 89 y/o female with pmh of HTN, CAD with stents, HLD, RA, depression, left femur fracture. Patient is being seen today to enroll into the [facility]. \par \par HPI/ROS reviewed with patient & daughter (over the phone)\par \par Patient was living alone in [place] until six Month's ago -- she fell - went to the hospital, had surgery --->rehab. After rehab, she went to live with her granddaughter and family. \par \par Patient is A&O x 4, very pleasant. Patient lives with grand-daughter and family and has aide to care for her during the day. Currently denies pain. \par Daughter discussed a few issues/concerns. \par \par nocturia - grand-daughter will put patient to bed around 730pm and the HHA comes in the morning, around 730am. When HHA arrives, patient is wet - she pees through her clothes. Patient does wake up in the middle of the night to urinate, but is not able to get out of bed --> so, she urinates in the diaper throughout the night. \par \par Ambulation - ambulates with cane and one person assist. \par Appetite: "so-so" terrible when she was in rehab - meals prepared by HHA. daughter reports gained weight since she had been home. \par BM - BM's daily.  It can be hard, HHA gives her prunes daily.  Continent of urine and stool. \par pain - denies pain at rest, has some arthritic pain to knees/wrists when she walks. manages with Tylenol. \par Mood/Behavior - cries easily. wants to go back to her own home, she does enjoy seeing great grandkids all day. \par \par \par Patient denies fever, cough, trouble breathing, rash, vomiting and diarrhea. Patient has not been in close contact with someone covid positive. \par N95 mask, gloves, eye wear and gown used during visit: Y. Total face to face time with patient is 60min.\par \par

## 2021-10-01 NOTE — REVIEW OF SYSTEMS
[Lower Ext Edema] : lower extremity edema [Constipation] : constipation [Nocturia] : nocturia [Joint Pain] : joint pain [Joint Stiffness] : joint stiffness [Muscle Weakness] : muscle weakness [Depression] : depression [Negative] : Psychiatric [Pain] : no pain [Redness] : no redness [Dryness] : no dryness  [Itching] : no itching [Earache] : no earache [Hearing Loss] : no hearing loss [Nosebleed] : no nosebleeds [Hoarseness] : no hoarseness [Nasal Discharge] : no nasal discharge [Sore Throat] : no sore throat [Postnasal Drip] : no postnasal drip [Chest Pain] : no chest pain [Palpitations] : no palpitations [Leg Claudication] : no leg claudication [Orthopnea] : no orthopnea [Paroxysmal Nocturnal Dyspnea] : no paroxysmal nocturnal dyspnea [Shortness Of Breath] : no shortness of breath [Wheezing] : no wheezing [Cough] : no cough [Dyspnea on Exertion] : no dyspnea on exertion [Abdominal Pain] : no abdominal pain [Nausea] : no nausea [Diarrhea] : diarrhea [Vomiting] : no vomiting [Heartburn] : no heartburn [Dysuria] : no dysuria [Incontinence] : no incontinence [Hematuria] : no hematuria [Frequency] : no frequency [Vaginal Discharge] : no vaginal discharge [Joint Swelling] : no joint swelling [Muscle Pain] : no muscle pain [Back Pain] : no back pain [FreeTextEntry3] : wears glasses

## 2021-10-01 NOTE — CHRONIC CARE ASSESSMENT
[Other: ____] : [unfilled] [Can not Exercise (Disability)] : Exercise: The patient can not exercise due to disability [None] : The patient does not exercise [PPS Score: ____] : Palliative Performance Scale (PPS) Score: [unfilled] [FreeTextEntry1] : regular diet  [de-identified] : continue diet as tolerated

## 2021-10-01 NOTE — REASON FOR VISIT
[Initial Annual Medicare Wellness Visit] : an initial annual Medicare wellness visit [Family Member] : family member [Formal Caregiver] : formal caregiver [Pre-Visit Preparation] : pre-visit preparation was done [Intercurrent Specialty/Sub-specialty Visits] : the patient has no intercurrent specialty/sub-specialty visits [FreeTextEntry2] : chart review

## 2021-10-01 NOTE — PHYSICAL EXAM
[No Acute Distress] : no acute distress [Well Nourished] : well nourished [Well Developed] : well developed [Normal Sclera/Conjunctiva] : normal sclera/conjunctiva [Normal Outer Ear/Nose] : the ears and nose were normal in appearance [Normal Oropharynx] : the oropharynx was normal [Normal TMs] : both tympanic membranes were normal [No JVD] : no jugular venous distention [Supple] : the neck was supple [No LAD] : no lymphadenopathy [No Respiratory Distress] : no respiratory distress [Clear to Auscultation] : lungs were clear to auscultation bilaterally [No Accessory Muscle Use] : no accessory muscle use [Normal Rate] : heart rate was normal  [Regular Rhythm] : with a regular rhythm [Normal S1, S2] : normal S1 and S2 [No Murmurs] : no murmurs heard [Normal Bowel Sounds] : normal bowel sounds [Non Tender] : non-tender [Soft] : abdomen soft [Not Distended] : not distended [No Hernias] : no hernia was discovered [No CVA Tenderness] : no ~M costovertebral angle tenderness [No Spinal Tenderness] : no spinal tenderness [Kyphosis] : no kyphosis present [Normal Gait] : normal gait [No Joint Swelling] : no joint swelling seen [No Rash] : no rash [No Skin Lesions] : no skin lesions [Acne] : no acne [Cranial Nerves Intact] : cranial nerves 2-12 were intact [No Motor Deficits] : the motor exam was normal [No Gross Sensory Deficits] : no gross sensory deficits [Oriented x3] : oriented to person, place, and time [Normal Affect] : the affect was normal [Normal Mood] : the mood was normal

## 2021-10-01 NOTE — COUNSELING
[Normal Weight - ( BMI  <25 )] : normal weight - ( BMI  <25 ) [DASH diet recommended] : DASH diet recommended [Sodium restriction 2gm recommended] : sodium restriction 2 gm recommended [Continue diet as tolerated] : continue diet as tolerated based on goals of care [Non - Smoker] : non-smoker [Smoke/CO Detectors] : smoke/CO detectors [Use assistive device to avoid falls] : use assistive device to avoid falls [Remove clutter and unsafe carpeting to avoid falls] : remove clutter and unsafe carpeting to avoid falls [] : diabetic screening [Improve mobility] : improve mobility [Minimize unnecessary interventions] : minimize unnecessary interventions [Maintain functional ability] : maintain functional ability [Patient/Caregiver has ___ understanding of disease process] : patient/caregiver has [unfilled] understanding of disease process [Completed DNR] : completed DNR [Completed Medical Orders for Life-Sustaining Treatment] : completed medical orders for life-sustaining treatment [DNR] : Code Status: DNR [Limited] : Treatment Guidelines: Limited [DNI] : Intubation: DNI [[facility]: _____[facility] [unfilled] [_____] : HCP: [unfilled] [ - New patient with 2 or more chronic conditions; CCM discussed and patient-centered care plan established] : New patient with 2 or more chronic conditions; CCM discussed and patient-centered care plan established

## 2021-10-01 NOTE — HEALTH RISK ASSESSMENT
[HRA Reviewed] : Health risk assessment reviewed [Independent] : feeding [Some assistance needed] : managing finances [Full assistance needed] : managing medications [Any fall with injury in past year] : Patient reported fall with injury in the past year [No] : The patient does not have visual impairment [TimeGetUpGo] : 28

## 2021-10-01 NOTE — CURRENT MEDS
[Medication and Allergies Reconciled] : medication and allergies reconciled [High Risk Medications Reviewed and Reconciled (Beers Criteria)] : high risk medications reviewed and reconciled [Adherent to medications] : Patient is adherent to medications as prescribed [de-identified] : medications managed by patient's daughter

## 2021-10-20 NOTE — ED CDU PROVIDER SUBSEQUENT DAY NOTE - CROS ED GI ALL NEG
negative...
78 year old male with ?seizure history on levetiracetam in ER for stroke like symptoms.  Wife at bedside during telestroke.  PA confirmed story with wife.  Patient woke up in normal state of health at 0900hrs.  Around 0930hrs, patient slumped over.  NIHSS over camera significant for dense left hemiparesis with forced right gaze deviation. 1/2 orientation and extinction to double tactile stimulus.  Spoke with neuro interventional radiology fellow who accepts patient straight to IR.    Sara Clemente MD  PGY5  Vascular Neurology Fellow    Under the guidance of Dr. Richard Libman

## 2021-11-02 ENCOUNTER — APPOINTMENT (OUTPATIENT)
Dept: HOME HEALTH SERVICES | Facility: HOME HEALTH | Age: 86
End: 2021-11-02

## 2021-11-20 ENCOUNTER — EMERGENCY (EMERGENCY)
Facility: HOSPITAL | Age: 86
LOS: 1 days | Discharge: HOME | End: 2021-11-20
Attending: EMERGENCY MEDICINE | Admitting: EMERGENCY MEDICINE
Payer: MEDICARE

## 2021-11-20 ENCOUNTER — TRANSCRIPTION ENCOUNTER (OUTPATIENT)
Age: 86
End: 2021-11-20

## 2021-11-20 VITALS
OXYGEN SATURATION: 100 % | TEMPERATURE: 98 F | RESPIRATION RATE: 18 BRPM | HEART RATE: 60 BPM | DIASTOLIC BLOOD PRESSURE: 66 MMHG | HEIGHT: 63 IN | SYSTOLIC BLOOD PRESSURE: 125 MMHG

## 2021-11-20 PROCEDURE — 99283 EMERGENCY DEPT VISIT LOW MDM: CPT | Mod: GC

## 2021-11-20 RX ORDER — DIPHENHYDRAMINE HCL 50 MG
25 CAPSULE ORAL ONCE
Refills: 0 | Status: COMPLETED | OUTPATIENT
Start: 2021-11-20 | End: 2021-11-20

## 2021-11-20 RX ORDER — FAMOTIDINE 10 MG/ML
20 INJECTION INTRAVENOUS DAILY
Refills: 0 | Status: DISCONTINUED | OUTPATIENT
Start: 2021-11-20 | End: 2021-11-24

## 2021-11-20 RX ORDER — KETOCONAZOLE 20 MG/G
1 AEROSOL, FOAM TOPICAL
Qty: 14 | Refills: 0
Start: 2021-11-20 | End: 2021-12-03

## 2021-11-20 RX ADMIN — FAMOTIDINE 20 MILLIGRAM(S): 10 INJECTION INTRAVENOUS at 19:02

## 2021-11-20 RX ADMIN — Medication 25 MILLIGRAM(S): at 19:02

## 2021-11-20 NOTE — ED PROVIDER NOTE - NSFOLLOWUPINSTRUCTIONS_ED_ALL_ED_FT
You were seen and evaluated in the ED for your allergic reaction.     History and physical exam demonstrated no life-threatening illness or emergency at this time, we provided you with oral medications to treat your reaction.     Please follow up with [facility]; please call [phone] to make an appointment for further evaluation of your allergies.     Should your rash worsen, or you develop new onset nausea, vomiting, shortness of breath, or palpitations please call 911 and report to the ED immediately thereafter. You were seen and evaluated in the ED for your rash.    Dermatology will follow up with you on Monday. Attached is their clinic information for your reference.     History and physical exam demonstrated no life-threatening illness or emergency at this time, we provided you with oral medications to treat your reaction.     Please follow up with [facility]; please call [phone] to make an appointment for further evaluation of your allergies.     Should your rash worsen, or you develop new onset nausea, vomiting, shortness of breath, or palpitations please call 911 and report to the ED immediately thereafter.

## 2021-11-20 NOTE — ED ADULT NURSE NOTE - OBJECTIVE STATEMENT
Pt. is A+Ox4 and c/o itchy red non-raised blanchable rash to ABD x 2 days.  Family states that they put hydrocortisone on area, but that made rash worse.  VS stable. Denies any other complaints body wide. Pt. is A+Ox4 and c/o itchy red non-raised blanchable rash to ABD x 2 days.  Family states that they put hydrocortisone on area, but that made rash worse.  She presents with a circumferential rash to ABD round to lower back. No evidence of rash anywhere else. VS stable. Denies any other complaints body wide.

## 2021-11-20 NOTE — CHART NOTE - NSCHARTNOTEFT_GEN_A_CORE
HPI: 89 yo F with PMH of HTN, HLD, CAD, presenting to ED for rash.     DERM consult/HPI: Rash started a few days ago over the abdomen, along sites of where the patient wears diapers. It is itchy, not painful. Patient has developed a similar rash back in July, thought to be from the same diapers, which responded to topical hydrocortisone and anti-itch lotions. For this current rash, patient has also tried hydrocortisone, sarna cream, and Clobetasol (one application yesterday), this has not yet helped. She switched to cotton underwear today.     No tongue swelling, difficulty breathing, difficulty swallowing, fevers/chills, abdominal pain, nausea/vomiting/diarrhea, dysuria/frequency.     Objective data:    T 97.8, HR 60, /66, SpO2 100%    EXAM (per photos): erythematous papules coalescing into plaques with vesiculation of the abdomen and with satellite lesions extending into the chest     ASSESSMENT/PLAN: Dermatitis – consideration for possible candida infection given erythematous plaques with possible satellite lesions, vs contact dermatitis vs other acute eczematous process vs other.     At this time:   - Recommend topical ketoconazole cream BID   - Recommend moisturization with gentle emollients such as plain petroleum jelly   - Recommend discontinuing topical steroids for now   - Recommend outpatient follow up with at our clinic located at [address] [place] [address] [address], [place], [place] ([phone]).     The patient's case and photos were reviewed remotely with the dermatology attending Dr. Cabello. Recommendations were communicated with the primary team.  Please page [phone] w/10 digit call back number for further related questions.    Keyla Florian MD  Resident Physician, PGY2  [facility]  Pager: [phone]  Office: [phone]

## 2021-11-20 NOTE — ED PROVIDER NOTE - PATIENT PORTAL LINK FT
You can access the FollowMyHealth Patient Portal offered by [facility] by registering at the following website: [email or web address]. By joining [facility]’s FollowMyHealth portal, you will also be able to view your health information using other applications (apps) compatible with our system.

## 2021-11-20 NOTE — ED PROVIDER NOTE - PHYSICAL EXAMINATION
GEN - NAD; non-toxic; A+Ox3, speaking full sentences, steady gait   HENT - NC/AT, No visible Ecchymosis, No Abrasions, No Lac/Tears, MMM, no discharge; Oropharynx without any swelling/erythema  EYES - EOMI, PERRL, no conjunctival pallor, no scleral icterus  NECK - Neck supple, No LAD, No Swelling  PULM - CTA B/L,  symmetric breath sounds  CV -  RRR, S1 S2, no murmurs 2+ Pulses B/L UE  GI - (-) Maria's, (-) Rovsings, (-) McBurneys; NT/ND, soft, no guarding, no rebound, no masses    MSK/EXT- no edema, no gross deformity, warm and well perfused, no calf tenderness/swelling/erythema   SPINE - CTL Spine without midline tenderness, stepoffs, deform  SKIN - (+) Erythematous, Blanching Macular Rash Circumferential around lower abdominal waistline - No Vesicles, No Crepitus, No Bullae, No TTP or Purulent DC  NEUROLOGIC - alert, moving all 4 ext with 5/5 Strength

## 2021-11-20 NOTE — ED PROVIDER NOTE - NS ED ROS FT
Constitution: No Fever or chills, No Weight Loss,   Eyes: No visual changes  HEENT: No cough, No Discharge, No Rhinorrhea, No URI symptoms  Cardio: No Chest pain, No Palpitations, No Dyspnea  Resp: No SOB, No Wheezing  GI: No abdominal pain, No Nausea, No Vomiting, No Constipation, No Diarrhea  : No burning upon urination, trouble urinating, no foul odor from urine  MSK: No Back pain, No Numbness, No Tingling, No Weakness  Neuro: No Headache, No changes to Vision, No changes to Hearing, Normal Gait  Skin: (+) rashes, No Bruising, No Swelling, (+) itchiness;

## 2021-11-20 NOTE — ED PROVIDER NOTE - PROGRESS NOTE DETAILS
Jayden, PGY2: Patient reassessed and noting symptomatic improvement with benadryl. Discussed care w/ dermatology who recommended ketoconazole cream. Will send to pharmacy. Patient to follow up with dermatology outpatient, Strict return precautions provided and patient understands and agrees w/ plan.

## 2021-11-20 NOTE — ED CLERICAL - NS ED CLERK NOTE PRE-ARRIVAL INFORMATION; ADDITIONAL PRE-ARRIVAL INFORMATION

## 2021-11-20 NOTE — ED ADULT TRIAGE NOTE - CHIEF COMPLAINT QUOTE
Pt c/o red non raised blanchable rash to abd x 2 days.  Pt c/o itching to area, family states that they put hydrocortisone on area, but that made rash worse.  PMH: CAD with stents, HTN, HLD

## 2021-11-20 NOTE — ED PROVIDER NOTE - CLINICAL SUMMARY MEDICAL DECISION MAKING FREE TEXT BOX
89 female, Hx: HTN, HLD, CAD (s/p stents, on ASA/Plavix) - presents with cc: localized abdominal rash, circumferential in presentation, progressive over the last week, with itchiness and erythema. Exam, presentation, and history concerning for Urticaria likely iso allergic insult. Eval is NOT consistent with infectious etiology(denies any fevers, chills, sore throat, non-tender rash), SJS/TEN (localized isolated, macular erythematous blanching rash with hives). Plan: Benadryl, Pepcid. Steroid considered however given age - will hold for possible delirium. Oleg att: 89 female, Hx: HTN, HLD, CAD (s/p stents, on ASA/Plavix) - presents with cc: localized abdominal rash, circumferential in presentation, progressive over the last week, with itchiness and erythema. Exam, presentation, and history concerning for Urticaria likely iso allergic insult. Eval is NOT consistent with infectious etiology(denies any fevers, chills, sore throat, non-tender rash), SJS/TEN (localized isolated, macular erythematous blanching rash with hives). Plan: Benadryl, Pepcid. Steroid considered however given age - will hold for possible delirium.

## 2021-11-20 NOTE — ED PROVIDER NOTE - OBJECTIVE STATEMENT
89 female, Hx: HTN, HLD, CAD (s/p stents, on ASA/Plavix) - presents with cc: localized abdominal rash, circumferential in presentation, progressive over the last week, with itchiness and erythema. Denies any recent environmental triggers, recent medication changes/introduction. Possible recent detergent change. Reports she has had previous rash with sensitivity to the diaper adhesive and EKG stickers, which is similar in presentation to this rash. Denies any fevers, chills, cough, CP, SOB, abdominal pain, nausea, vomiting, diarrhea, constipation, bloody stools, dysuric symptoms. Denies any oropharyngeal swelling, tongue swelling, facial rash. Has not tried anything for itchiness.

## 2021-11-22 ENCOUNTER — TRANSCRIPTION ENCOUNTER (OUTPATIENT)
Age: 86
End: 2021-11-22

## 2021-11-22 ENCOUNTER — APPOINTMENT (OUTPATIENT)
Dept: HOME HEALTH SERVICES | Facility: HOME HEALTH | Age: 86
End: 2021-11-22

## 2021-12-14 ENCOUNTER — APPOINTMENT (OUTPATIENT)
Dept: HOME HEALTH SERVICES | Facility: HOME HEALTH | Age: 86
End: 2021-12-14
Payer: MEDICARE

## 2021-12-14 DIAGNOSIS — Z86.018 PERSONAL HISTORY OF OTHER BENIGN NEOPLASM: ICD-10-CM

## 2021-12-14 DIAGNOSIS — Z87.39 PERSONAL HISTORY OF OTHER DISEASES OF THE MUSCULOSKELETAL SYSTEM AND CONNECTIVE TISSUE: ICD-10-CM

## 2021-12-14 DIAGNOSIS — Z71.85 ENCOUNTER FOR IMMUNIZATION SAFETY COUNSELING: ICD-10-CM

## 2021-12-14 DIAGNOSIS — Z87.898 PERSONAL HISTORY OF OTHER SPECIFIED CONDITIONS: ICD-10-CM

## 2021-12-14 DIAGNOSIS — Z86.59 PERSONAL HISTORY OF OTHER MENTAL AND BEHAVIORAL DISORDERS: ICD-10-CM

## 2021-12-14 DIAGNOSIS — J32.4 CHRONIC PANSINUSITIS: ICD-10-CM

## 2021-12-14 DIAGNOSIS — M19.019 PRIMARY OSTEOARTHRITIS, UNSPECIFIED SHOULDER: ICD-10-CM

## 2021-12-14 PROCEDURE — G0439: CPT

## 2021-12-14 PROCEDURE — 99349 HOME/RES VST EST MOD MDM 40: CPT | Mod: 25

## 2021-12-16 RX ORDER — CAMPHOR 0.45 %
25 GEL (GRAM) TOPICAL
Qty: 1 | Refills: 0 | Status: DISCONTINUED | COMMUNITY
Start: 2021-11-22 | End: 2021-12-14

## 2021-12-16 RX ORDER — SODIUM CHLORIDE 0.65 %
0.65 AEROSOL, SPRAY (ML) NASAL TWICE DAILY
Qty: 1 | Refills: 2 | Status: DISCONTINUED | COMMUNITY
Start: 2019-03-04 | End: 2021-12-14

## 2021-12-16 RX ORDER — KETOCONAZOLE 20 MG/G
2 CREAM TOPICAL TWICE DAILY
Qty: 1 | Refills: 6 | Status: DISCONTINUED | COMMUNITY
Start: 2021-11-22 | End: 2021-12-14

## 2021-12-17 VITALS
OXYGEN SATURATION: 98 % | DIASTOLIC BLOOD PRESSURE: 66 MMHG | SYSTOLIC BLOOD PRESSURE: 120 MMHG | RESPIRATION RATE: 16 BRPM | TEMPERATURE: 97 F | HEART RATE: 58 BPM

## 2021-12-17 PROBLEM — J32.4 CHRONIC PANSINUSITIS: Status: RESOLVED | Noted: 2019-03-04 | Resolved: 2021-12-17

## 2021-12-17 PROBLEM — Z86.018: Status: RESOLVED | Noted: 2021-09-15 | Resolved: 2021-12-17

## 2021-12-17 PROBLEM — Z71.85 VACCINE COUNSELING: Status: RESOLVED | Noted: 2021-04-01 | Resolved: 2021-12-17

## 2021-12-17 PROBLEM — M19.019 ARTHROPATHY OF SHOULDER REGION: Status: RESOLVED | Noted: 2019-03-04 | Resolved: 2021-12-17

## 2021-12-17 PROBLEM — Z87.39 HISTORY OF ROTATOR CUFF TEAR: Status: RESOLVED | Noted: 2017-12-07 | Resolved: 2021-12-17

## 2021-12-17 RX ORDER — PREDNISONE 20 MG/1
20 TABLET ORAL
Qty: 15 | Refills: 0 | Status: DISCONTINUED | COMMUNITY
Start: 2021-11-23

## 2021-12-17 RX ORDER — MUPIROCIN 20 MG/G
2 OINTMENT TOPICAL
Qty: 22 | Refills: 0 | Status: DISCONTINUED | COMMUNITY
Start: 2021-11-26

## 2021-12-17 RX ORDER — MOMETASONE FUROATE 1 MG/G
0.1 CREAM TOPICAL
Qty: 45 | Refills: 0 | Status: DISCONTINUED | COMMUNITY
Start: 2021-11-23

## 2021-12-17 NOTE — CHRONIC CARE ASSESSMENT
[Other: ____] : [unfilled] [Can not Exercise (Disability)] : Exercise: The patient can not exercise due to disability [None] : The patient does not exercise [PPS Score: ____] : Palliative Performance Scale (PPS) Score: [unfilled] [FreeTextEntry1] : regular diet  [de-identified] : continue diet as tolerated

## 2021-12-17 NOTE — PHYSICAL EXAM
[No Acute Distress] : no acute distress [Well Nourished] : well nourished [Well Developed] : well developed [Normal Sclera/Conjunctiva] : normal sclera/conjunctiva [Normal Outer Ear/Nose] : the ears and nose were normal in appearance [Normal Oropharynx] : the oropharynx was normal [Normal TMs] : both tympanic membranes were normal [No JVD] : no jugular venous distention [Supple] : the neck was supple [No LAD] : no lymphadenopathy [No Respiratory Distress] : no respiratory distress [Clear to Auscultation] : lungs were clear to auscultation bilaterally [No Accessory Muscle Use] : no accessory muscle use [Normal Rate] : heart rate was normal  [Regular Rhythm] : with a regular rhythm [Normal S1, S2] : normal S1 and S2 [No Murmurs] : no murmurs heard [Normal Bowel Sounds] : normal bowel sounds [Non Tender] : non-tender [Soft] : abdomen soft [Not Distended] : not distended [No Hernias] : no hernia was discovered [No CVA Tenderness] : no ~M costovertebral angle tenderness [No Spinal Tenderness] : no spinal tenderness [Normal Gait] : normal gait [No Joint Swelling] : no joint swelling seen [No Rash] : no rash [No Skin Lesions] : no skin lesions [Cranial Nerves Intact] : cranial nerves 2-12 were intact [No Motor Deficits] : the motor exam was normal [No Gross Sensory Deficits] : no gross sensory deficits [Oriented x3] : oriented to person, place, and time [Normal Affect] : the affect was normal [Normal Mood] : the mood was normal [Kyphosis] : no kyphosis present [Acne] : no acne

## 2021-12-17 NOTE — HISTORY OF PRESENT ILLNESS
[Patient] : patient [Family Member] : family member [FreeTextEntry1] : gait instability [FreeTextEntry2] : Patient denies fever, cough, trouble breathing, rash and vomiting. Patient has not been in close contact with someone who is COVID positive. N95 mask, gloves and eye wear worn during visit: Y \par Total face to face time with patient: 20 minutes. \par \par PMH: 89 y/o female with pmh of HTN, CAD with stents, HLD, RA, depression, left femur fracture. Patient is being seen today to enroll into the [facility]. \par \par Patient is A&O x 4, very pleasant. Patient lives with grand-daughter and family and has aide to care for her during the day. Currently denies pain. \par \par Ambulation - ambulates with cane and one person assist. \par Appetite: improved since coming home from rehab \par BM - BM's daily.  It can be hard, HHA gives her prunes daily.  Continent of urine and stool. \par pain - denies pain at rest, has some arthritic pain to knees/wrists when she walks. manages with Tylenol. \par Mood/Behavior - denies depression. \par \par Denies SOB, cough, fever, NV\par \par \par

## 2021-12-17 NOTE — CURRENT MEDS
[Medication and Allergies Reconciled] : medication and allergies reconciled [High Risk Medications Reviewed and Reconciled (Beers Criteria)] : high risk medications reviewed and reconciled [Adherent to medications] : Patient is adherent to medications as prescribed [de-identified] : medications managed by patient's daughter

## 2021-12-17 NOTE — REVIEW OF SYSTEMS
[Lower Ext Edema] : lower extremity edema [Constipation] : constipation [Nocturia] : nocturia [Joint Pain] : joint pain [Joint Stiffness] : joint stiffness [Muscle Weakness] : muscle weakness [Depression] : depression [Negative] : Heme/Lymph [Pain] : no pain [Redness] : no redness [Dryness] : no dryness  [Itching] : no itching [Earache] : no earache [Hearing Loss] : no hearing loss [Nosebleed] : no nosebleeds [Hoarseness] : no hoarseness [Nasal Discharge] : no nasal discharge [Sore Throat] : no sore throat [Postnasal Drip] : no postnasal drip [Chest Pain] : no chest pain [Palpitations] : no palpitations [Leg Claudication] : no leg claudication [Orthopnea] : no orthopnea [Paroxysmal Nocturnal Dyspnea] : no paroxysmal nocturnal dyspnea [Shortness Of Breath] : no shortness of breath [Wheezing] : no wheezing [Cough] : no cough [Dyspnea on Exertion] : no dyspnea on exertion [Abdominal Pain] : no abdominal pain [Nausea] : no nausea [Diarrhea] : diarrhea [Vomiting] : no vomiting [Heartburn] : no heartburn [Dysuria] : no dysuria [Incontinence] : no incontinence [Hematuria] : no hematuria [Frequency] : no frequency [Vaginal Discharge] : no vaginal discharge [Joint Swelling] : no joint swelling [Muscle Pain] : no muscle pain [Back Pain] : no back pain [FreeTextEntry3] : wears glasses

## 2021-12-17 NOTE — COUNSELING
[Normal Weight - ( BMI  <25 )] : normal weight - ( BMI  <25 ) [DASH diet recommended] : DASH diet recommended [Sodium restriction 2gm recommended] : sodium restriction 2 gm recommended [Continue diet as tolerated] : continue diet as tolerated based on goals of care [Non - Smoker] : non-smoker [Smoke/CO Detectors] : smoke/CO detectors [Use assistive device to avoid falls] : use assistive device to avoid falls [Remove clutter and unsafe carpeting to avoid falls] : remove clutter and unsafe carpeting to avoid falls [] : diabetic screening [Improve mobility] : improve mobility [Minimize unnecessary interventions] : minimize unnecessary interventions [Maintain functional ability] : maintain functional ability [Patient/Caregiver has ___ understanding of disease process] : patient/caregiver has [unfilled] understanding of disease process [Completed DNR] : completed DNR [Completed Medical Orders for Life-Sustaining Treatment] : completed medical orders for life-sustaining treatment [DNR] : Code Status: DNR [Limited] : Treatment Guidelines: Limited [DNI] : Intubation: DNI [[facility]: _____[facility] [unfilled] [_____] : HCP: [unfilled] [Established patient, extensive review of history, medical and functional status, risk factors and patient education] : Established patient, extensive review of history, medical and functional status, risk factors and patient education and counseling provided for subsequent annual wellness visit

## 2022-01-13 ENCOUNTER — NON-APPOINTMENT (OUTPATIENT)
Age: 87
End: 2022-01-13

## 2022-01-14 ENCOUNTER — TRANSCRIPTION ENCOUNTER (OUTPATIENT)
Age: 87
End: 2022-01-14

## 2022-01-14 ENCOUNTER — APPOINTMENT (OUTPATIENT)
Dept: HOME HEALTH SERVICES | Facility: HOME HEALTH | Age: 87
End: 2022-01-14
Payer: MEDICARE

## 2022-01-14 VITALS — DIASTOLIC BLOOD PRESSURE: 70 MMHG | SYSTOLIC BLOOD PRESSURE: 120 MMHG | OXYGEN SATURATION: 97 % | HEART RATE: 54 BPM

## 2022-01-14 PROCEDURE — 0004A: CPT

## 2022-01-17 NOTE — ED ADULT NURSE NOTE - BREATH SOUNDS, MLM
SUBJECTIVE:  Josselyn returns for follow up today for sinus congestion with copious post nasal drip.  Clear and yellow drainage had gone on for months.  Notes thick copious drainage.  Was on Augmentin for 10 days with some relief. Is on Pepcid recently.      Had a couple negative COVID tests a couple weeks ago, symptoms are now persistent and bothersome.  Hoarse voice, copious thick drainage down the back of the throat.  No fevers chills or sweats.  Did try azelatine over the weekend but this seemed to make things worse.  Is on fluticasone in the past.    Longstanding seasonal allergies seems to actually not clear up in the colder weather as they have in the past.      Current Outpatient Medications   Medication   • famotidine (PEPCID) 20 MG tablet   • azelastine (ASTELIN) 0.1 % nasal spray   • Bydureon BCise 2 MG/0.85ML auto injector   • simvastatin (ZOCOR) 10 MG tablet   • omega-3 acid ethyl esters (LOVAZA) 1 g capsule   • losartan (COZAAR) 50 MG tablet   • Multiple Vitamins-Minerals (MULTIVITAMIN ADULT PO)   • fexofenadine (ALLEGRA) 180 MG tablet   • Glucose Blood (ONE TOUCH TEST STRIPS) strip   • ONE TOUCH LANCETS XX MISC   • levocetirizine (XYZAL) 5 MG tablet   • acyclovir (ZOVIRAX) 5 % Cream   • Aspirin-Acetaminophen-Caffeine (EXCEDRIN PO)   • EPINEPHrine (AUVI-Q) 0.3 MG/0.3ML auto-injector   • ondansetron (ZOFRAN) 8 MG tablet     No current facility-administered medications for this visit.       Response to treatment partial.    Allergies as of 01/17/2022 - Reviewed 01/17/2022   Allergen Reaction Noted   • Peppers   (food) HIVES and ANAPHYLAXIS 08/08/2011   • Ibuprofen HIVES 04/15/2010   • Latex HIVES 04/15/2010   • Metformin CARDIAC DISTURBANCES 07/07/2014       Interval medical history is uncahnged.  Josselyn has no other significant problems or complaints.     Environmental changes: Mite covers are  in place.  Pets not present.  Tobacco exposure no present.    PHYSICAL EXAMINATION:   GENERAL:  Josselyn is in no acute distress.   VITAL SIGNS:   Visit Vitals  BP (!) 148/90 (BP Location: RUE - Right upper extremity, Patient Position: Sitting)   Pulse (!) 108   Temp 98.4 °F (36.9 °C) (Temporal)   Resp 16   LMP 02/09/2021 (Exact Date)   SpO2 98%      HEENT exam is clear. There are clear allergic shiners or sinus tenderness. Conjunctivae, pupils, and lids are clear. External and internal ear exam is clear. Nasal mucosa is pale. There is no turbinate hypertrophy, no swelling,no discharge, no bleeding, no ulcer, no polyps.  Oropharynx is clear, red. There is no thrush. Lips, gums and teeth are normal.  There is no thrush.  NECK: Supple and flexible with no cervical lymphadenopathy. Trachea is midline. There is no stridor.  The thyroid exam is normal.  No palpable mass  CHEST: There are no supra or sub-clavicular lymph nodes palpable.  There are no retractions or visible respiratory distress.  No wheezes, rales or rhonchi. There is good air movement throughout.    CARDIAC: Regular rate and rhythm. Normal S1, S2. No murmur, gallop or rub. There is no peripheral edema.   ABDOMEN: Normal bowel sounds in all four quadrants.  Soft, with no epigastric tenderness or rebound.  SKIN: Warm and dry.  There is no eczema, urticaria or dermatographism.   NEURO: The patient is alert and oriented x 3 with a normal affect.  The patient gives a clear medical history.      ASSESSMENT AND PLAN:  Based on today's evaluation, I made the following assessment and recommendations:    1. Acute on chronic sinusitis with throat irritation, was treated with Augmentin for sinusitis.  Does not have purulent drainage now but has  thick drainage down the back of the throat.  Isn't respond azelatine, instead will try going back to just fluticasone and try nasal irrigations a couple times a day.  See if this helps clear up the drainage down the back of the throat.    Thank you for allowing me to participate in Josselyn's care.     Ravi MUHAMMAD  MD Imer     Clear

## 2022-02-18 ENCOUNTER — NON-APPOINTMENT (OUTPATIENT)
Age: 87
End: 2022-02-18

## 2022-02-24 ENCOUNTER — APPOINTMENT (OUTPATIENT)
Dept: HOME HEALTH SERVICES | Facility: HOME HEALTH | Age: 87
End: 2022-02-24

## 2022-03-01 ENCOUNTER — NON-APPOINTMENT (OUTPATIENT)
Age: 87
End: 2022-03-01

## 2022-03-02 ENCOUNTER — NON-APPOINTMENT (OUTPATIENT)
Age: 87
End: 2022-03-02

## 2022-03-09 ENCOUNTER — APPOINTMENT (OUTPATIENT)
Dept: HOME HEALTH SERVICES | Facility: HOME HEALTH | Age: 87
End: 2022-03-09

## 2022-03-16 ENCOUNTER — TRANSCRIPTION ENCOUNTER (OUTPATIENT)
Age: 87
End: 2022-03-16

## 2022-03-16 ENCOUNTER — APPOINTMENT (OUTPATIENT)
Dept: HOME HEALTH SERVICES | Facility: HOME HEALTH | Age: 87
End: 2022-03-16
Payer: MEDICARE

## 2022-03-16 ENCOUNTER — NON-APPOINTMENT (OUTPATIENT)
Age: 87
End: 2022-03-16

## 2022-03-16 VITALS
RESPIRATION RATE: 16 BRPM | DIASTOLIC BLOOD PRESSURE: 97 MMHG | SYSTOLIC BLOOD PRESSURE: 116 MMHG | OXYGEN SATURATION: 98 % | HEART RATE: 53 BPM

## 2022-03-16 PROCEDURE — 99349 HOME/RES VST EST MOD MDM 40: CPT

## 2022-03-16 NOTE — COUNSELING
[Normal Weight - ( BMI  <25 )] : normal weight - ( BMI  <25 ) [DASH diet recommended] : DASH diet recommended [Sodium restriction 2gm recommended] : sodium restriction 2 gm recommended [Continue diet as tolerated] : continue diet as tolerated based on goals of care [Non - Smoker] : non-smoker [Smoke/CO Detectors] : smoke/CO detectors [Use assistive device to avoid falls] : use assistive device to avoid falls [Remove clutter and unsafe carpeting to avoid falls] : remove clutter and unsafe carpeting to avoid falls [] : diabetic screening [Improve mobility] : improve mobility [Minimize unnecessary interventions] : minimize unnecessary interventions [Maintain functional ability] : maintain functional ability [Patient/Caregiver has ___ understanding of disease process] : patient/caregiver has [unfilled] understanding of disease process [Completed DNR] : completed DNR [Completed Medical Orders for Life-Sustaining Treatment] : completed medical orders for life-sustaining treatment [DNR] : Code Status: DNR [Limited] : Treatment Guidelines: Limited [DNI] : Intubation: DNI [[facility] Date: _____] [facility]/last verification date: [unfilled] [_____] : HCP: [unfilled]

## 2022-03-17 ENCOUNTER — NON-APPOINTMENT (OUTPATIENT)
Age: 87
End: 2022-03-17

## 2022-03-30 NOTE — PHYSICAL EXAM
[No Acute Distress] : no acute distress [Well Nourished] : well nourished [Well Developed] : well developed [Normal Sclera/Conjunctiva] : normal sclera/conjunctiva [Normal Outer Ear/Nose] : the ears and nose were normal in appearance [Normal Oropharynx] : the oropharynx was normal [Normal TMs] : both tympanic membranes were normal [No JVD] : no jugular venous distention [Supple] : the neck was supple [No LAD] : no lymphadenopathy [No Respiratory Distress] : no respiratory distress [Clear to Auscultation] : lungs were clear to auscultation bilaterally [No Accessory Muscle Use] : no accessory muscle use [Normal Rate] : heart rate was normal  [Regular Rhythm] : with a regular rhythm [Normal S1, S2] : normal S1 and S2 [No Murmurs] : no murmurs heard [Normal Bowel Sounds] : normal bowel sounds [Non Tender] : non-tender [Soft] : abdomen soft [Not Distended] : not distended [No Hernias] : no hernia was discovered [Normal Gait] : normal gait [No Joint Swelling] : no joint swelling seen [No Rash] : no rash [No Skin Lesions] : no skin lesions [Cranial Nerves Intact] : cranial nerves 2-12 were intact [No Motor Deficits] : the motor exam was normal [No Gross Sensory Deficits] : no gross sensory deficits [Oriented x3] : oriented to person, place, and time [Normal Affect] : the affect was normal [Normal Mood] : the mood was normal [Kyphosis] : no kyphosis present [Acne] : no acne

## 2022-03-30 NOTE — HISTORY OF PRESENT ILLNESS
[Patient] : patient [Family Member] : family member [FreeTextEntry1] : gait instability [FreeTextEntry2] : Patient denies fever, cough, trouble breathing, rash and vomiting. Patient has not been in close contact with someone who is COVID positive. N95 mask, gloves and eye wear worn during visit: Y \par Total face to face time with patient: 20 minutes. \par \par PMH: 89 y/o female with pmh of HTN, CAD with stents, HLD, RA, depression, left femur fracture. Patient is being seen today to enroll into the [facility]. \par Needs labs \par Patient is A&O x 4, very pleasant. Patient lives with grand-daughter and family and has aide to care for her during the day. Has pain. \par \par Ambulation - ambulates with walker and one person assist. \par Appetite: improved since coming home from rehab \par Constipation: sometimes 3 days apart. HHA gives her prunes daily.  Continent of urine and stool. \par pain - denies pain at rest, has some arthritic pain to knees/wrists when she walks. encourage Tylenol.1000mg PO TID, voltaren gel.  Does PT exercises on own. Doesn't need to reorder PT. \par Mood/Behavior - denies depression. \par \par Denies SOB, cough, fever, NV\par \par \par

## 2022-03-30 NOTE — CHRONIC CARE ASSESSMENT
[PPS Score: ____] : Palliative Performance Scale (PPS) Score: [unfilled] [Other: ____] : [unfilled] [Can not Exercise (Disability)] : Exercise: The patient can not exercise due to disability [None] : The patient does not exercise [FreeTextEntry1] : regular diet  [de-identified] : continue diet as tolerated

## 2022-03-30 NOTE — CURRENT MEDS
[Medication and Allergies Reconciled] : medication and allergies reconciled [High Risk Medications Reviewed and Reconciled (Beers Criteria)] : high risk medications reviewed and reconciled [Adherent to medications] : Patient is adherent to medications as prescribed [de-identified] : medications managed by patient's daughter

## 2022-03-30 NOTE — REASON FOR VISIT
[Family Member] : family member [Formal Caregiver] : formal caregiver [Pre-Visit Preparation] : pre-visit preparation was done [Follow-Up] : a follow-up visit [Intercurrent Specialty/Sub-specialty Visits] : the patient has no intercurrent specialty/sub-specialty visits [FreeTextEntry2] : chart review

## 2022-04-01 LAB
ALBUMIN SERPL ELPH-MCNC: 4 G/DL
ALP BLD-CCNC: 108 U/L
ALT SERPL-CCNC: 12 U/L
ANION GAP SERPL CALC-SCNC: 12 MMOL/L
AST SERPL-CCNC: 17 U/L
BASOPHILS # BLD AUTO: 0.03 K/UL
BASOPHILS NFR BLD AUTO: 0.7 %
BILIRUB SERPL-MCNC: 0.6 MG/DL
BUN SERPL-MCNC: 14 MG/DL
CALCIUM SERPL-MCNC: 9 MG/DL
CHLORIDE SERPL-SCNC: 105 MMOL/L
CHOLEST SERPL-MCNC: 152 MG/DL
CO2 SERPL-SCNC: 26 MMOL/L
CREAT SERPL-MCNC: 0.85 MG/DL
EGFR: 64 ML/MIN/1.73M2
EOSINOPHIL # BLD AUTO: 0.11 K/UL
EOSINOPHIL NFR BLD AUTO: 2.6 %
ESTIMATED AVERAGE GLUCOSE: 105 MG/DL
GLUCOSE SERPL-MCNC: 95 MG/DL
HBA1C MFR BLD HPLC: 5.3 %
HCT VFR BLD CALC: 38.4 %
HDLC SERPL-MCNC: 54 MG/DL
HGB BLD-MCNC: 11.7 G/DL
IMM GRANULOCYTES NFR BLD AUTO: 0.2 %
LDLC SERPL CALC-MCNC: 75 MG/DL
LYMPHOCYTES # BLD AUTO: 1.26 K/UL
LYMPHOCYTES NFR BLD AUTO: 30.1 %
MAN DIFF?: NORMAL
MCHC RBC-ENTMCNC: 30.2 PG
MCHC RBC-ENTMCNC: 30.5 GM/DL
MCV RBC AUTO: 99.2 FL
MONOCYTES # BLD AUTO: 0.44 K/UL
MONOCYTES NFR BLD AUTO: 10.5 %
NEUTROPHILS # BLD AUTO: 2.33 K/UL
NEUTROPHILS NFR BLD AUTO: 55.9 %
NONHDLC SERPL-MCNC: 98 MG/DL
PLATELET # BLD AUTO: 218 K/UL
POTASSIUM SERPL-SCNC: 3.8 MMOL/L
PROT SERPL-MCNC: 5.9 G/DL
RBC # BLD: 3.87 M/UL
RBC # FLD: 13.1 %
SODIUM SERPL-SCNC: 142 MMOL/L
TRIGL SERPL-MCNC: 114 MG/DL
TSH SERPL-ACNC: 3.38 UIU/ML
WBC # FLD AUTO: 4.18 K/UL

## 2022-04-04 ENCOUNTER — NON-APPOINTMENT (OUTPATIENT)
Age: 87
End: 2022-04-04

## 2022-04-26 ENCOUNTER — APPOINTMENT (OUTPATIENT)
Dept: HOME HEALTH SERVICES | Facility: HOME HEALTH | Age: 87
End: 2022-04-26

## 2022-06-13 NOTE — HISTORY OF PRESENT ILLNESS
[facility] has been removed as, unable to contact. Ordering provider has been notified.    Please contact patient, if further communication is needed.      Request Created 12/14/2021 09:37 KESHA Salmeron Appointment Encounter: Details       Appointment Scheduled 12/14/2021 09:48 Shu De La O NEW PATIENT VISIT on 3/7/2022 at 10:20 AM (Canceled)       Referral Authorized 12/15/2021 07:35 Glenys Monge       Appointment Canceled 02/10/2022 10:53 Rachael Lieberman Reason: Clinician/Physician Canceled   NEW PATIENT VISIT on 3/7/2022 at 10:20 AM       Appointment Rescheduled 02/10/2022 10:53 Rachael Lieberman NEW PATIENT VISIT on 5/3/2022 at 8:40 AM (Canceled)       Appointment Canceled 04/29/2022 15:36 Patient Portal,  Reason: Other   NEW PATIENT VISIT on 5/3/2022 at 8:40 AM       Patient Called 06/13/2022 10:46 Yael Egan Letter sent: Details    incomplete service to letter           [FreeTextEntry1] : Patient is an 89 year old woman with a history of CAD s/p two stents placed in the past, hypertension, hyperlipidemia, arthritis, spinal stenosis, and former tobacco use who presents today for follow up of coronary artery disease and hypertension. She continues to experience a loss of taste and smell, as well as a decreased appetite. She also has noticed increased weakness and fatigue. She stopped taking her medications two days ago, however, she began experiencing weakness so she restarted on her medications this morning. She otherwise denies any chest pain, shortness of breath, palpitations, headaches or dizziness.

## 2022-07-15 ENCOUNTER — APPOINTMENT (OUTPATIENT)
Dept: HOME HEALTH SERVICES | Facility: HOME HEALTH | Age: 87
End: 2022-07-15

## 2022-09-14 ENCOUNTER — APPOINTMENT (OUTPATIENT)
Dept: HOME HEALTH SERVICES | Facility: HOME HEALTH | Age: 87
End: 2022-09-14

## 2022-09-14 VITALS
DIASTOLIC BLOOD PRESSURE: 78 MMHG | SYSTOLIC BLOOD PRESSURE: 122 MMHG | OXYGEN SATURATION: 98 % | HEART RATE: 57 BPM | TEMPERATURE: 97.7 F | RESPIRATION RATE: 16 BRPM

## 2022-09-14 DIAGNOSIS — Z71.89 OTHER SPECIFIED COUNSELING: ICD-10-CM

## 2022-09-14 PROCEDURE — G0008: CPT

## 2022-09-14 PROCEDURE — 99349 HOME/RES VST EST MOD MDM 40: CPT | Mod: 25

## 2022-09-14 PROCEDURE — 90662 IIV NO PRSV INCREASED AG IM: CPT

## 2022-09-14 NOTE — COUNSELING
[Normal Weight - ( BMI  <25 )] : normal weight - ( BMI  <25 ) [DASH diet recommended] : DASH diet recommended [Sodium restriction 2gm recommended] : sodium restriction 2 gm recommended [Continue diet as tolerated] : continue diet as tolerated based on goals of care [Non - Smoker] : non-smoker [Smoke/CO Detectors] : smoke/CO detectors [Use assistive device to avoid falls] : use assistive device to avoid falls [Remove clutter and unsafe carpeting to avoid falls] : remove clutter and unsafe carpeting to avoid falls [] : diabetic screening [Improve mobility] : improve mobility [Minimize unnecessary interventions] : minimize unnecessary interventions [Maintain functional ability] : maintain functional ability [Patient/Caregiver has ___ understanding of disease process] : patient/caregiver has [unfilled] understanding of disease process [Completed DNR] : completed DNR [Completed Medical Orders for Life-Sustaining Treatment] : completed medical orders for life-sustaining treatment [DNR] : Code Status: DNR [Limited] : Treatment Guidelines: Limited [DNI] : Intubation: DNI [[facility]: _____[facility]last verification date: [unfilled] [_____] : HCP: [unfilled]

## 2022-09-15 PROBLEM — Z71.89 ACP (ADVANCE CARE PLANNING): Status: ACTIVE | Noted: 2021-09-15

## 2022-09-15 NOTE — CURRENT MEDS
[Medication and Allergies Reconciled] : medication and allergies reconciled [High Risk Medications Reviewed and Reconciled (Beers Criteria)] : high risk medications reviewed and reconciled [Adherent to medications] : Patient is adherent to medications as prescribed [de-identified] : medications managed by patient's daughter

## 2022-09-15 NOTE — PHYSICAL EXAM
[No Acute Distress] : no acute distress [Well Nourished] : well nourished [Well Developed] : well developed [Normal Sclera/Conjunctiva] : normal sclera/conjunctiva [Normal Outer Ear/Nose] : the ears and nose were normal in appearance [Normal Oropharynx] : the oropharynx was normal [Normal TMs] : both tympanic membranes were normal [No JVD] : no jugular venous distention [Supple] : the neck was supple [No LAD] : no lymphadenopathy [No Respiratory Distress] : no respiratory distress [Clear to Auscultation] : lungs were clear to auscultation bilaterally [No Accessory Muscle Use] : no accessory muscle use [Normal Rate] : heart rate was normal  [Regular Rhythm] : with a regular rhythm [Normal S1, S2] : normal S1 and S2 [No Murmurs] : no murmurs heard [Normal Bowel Sounds] : normal bowel sounds [Non Tender] : non-tender [Soft] : abdomen soft [Not Distended] : not distended [No Hernias] : no hernia was discovered [No Joint Swelling] : no joint swelling seen [No Rash] : no rash [No Skin Lesions] : no skin lesions [Cranial Nerves Intact] : cranial nerves 2-12 were intact [No Motor Deficits] : the motor exam was normal [No Gross Sensory Deficits] : no gross sensory deficits [Oriented x3] : oriented to person, place, and time [Normal Affect] : the affect was normal [Normal Mood] : the mood was normal [Kyphosis] : no kyphosis present [Acne] : no acne [de-identified] : ambulates with walker

## 2022-09-15 NOTE — CHRONIC CARE ASSESSMENT
[PPS Score: ____] : Palliative Performance Scale (PPS) Score: [unfilled] [Other: ____] : [unfilled] [Can not Exercise (Disability)] : Exercise: The patient can not exercise due to disability [None] : The patient does not exercise [FreeTextEntry1] : regular diet  [de-identified] : continue diet as tolerated

## 2022-09-15 NOTE — HISTORY OF PRESENT ILLNESS
[Patient] : patient [Family Member] : family member [FreeTextEntry1] : gait instability [FreeTextEntry2] : COVID-19 Screen - 09/14/2022 \par N95 mask, gloves, eyewear, and gown (if indicated) used during visit: Yes \par Patient or caregiver denies fever, cough, trouble breathing, rash, and vomiting. Patient has not been in close contact with anyone who is COVID-19 positive or suspected of having COVID-19. \par \par PMH: 89 y/o female with pmh of HTN, CAD with stents, HLD, RA, depression, left femur fracture. Patient is being seen for a routine follow up visit. \par \par Patient seen with HHA. Daughter contacted during visit to discuss plan of care, has no current concerns. MOLST reviewed with daughter and patient, wishes to remain the same - DNR/DNI  \par \par Ambulation - ambulates with walker and one person assist. \par Appetite: improved since coming home from rehab \par Constipation: sometimes 3 days apart. HHA gives her prunes daily.  Continent of urine and stool. \par pain - denies pain at rest, has some arthritic pain to knees/wrists when she walks. encourage Tylenol.1000mg PO TID, voltaren gel.  Does PT exercises on own. Doesn't need to reorder PT. \par Mood/Behavior: A&O x 4, pleasant. Denies depression, however feels down at times as she is not as mobile. Tearful briefly during visit as she thought about her son who passed. \par \par Patient lives with grand-daughter and family and has aide to care for her during the day. \par \par Denies SOB, cough, fever, NV

## 2022-10-24 ENCOUNTER — APPOINTMENT (OUTPATIENT)
Dept: HOME HEALTH SERVICES | Facility: HOME HEALTH | Age: 87
End: 2022-10-24

## 2022-11-07 ENCOUNTER — APPOINTMENT (OUTPATIENT)
Dept: HOME HEALTH SERVICES | Facility: HOME HEALTH | Age: 87
End: 2022-11-07

## 2022-11-07 VITALS
DIASTOLIC BLOOD PRESSURE: 74 MMHG | RESPIRATION RATE: 16 BRPM | OXYGEN SATURATION: 98 % | SYSTOLIC BLOOD PRESSURE: 128 MMHG | TEMPERATURE: 97.8 F | HEART RATE: 58 BPM

## 2022-11-07 PROCEDURE — 0124A: CPT

## 2022-11-07 PROCEDURE — 99347 HOME/RES VST EST SF MDM 20: CPT | Mod: 25

## 2023-01-10 ENCOUNTER — TRANSCRIPTION ENCOUNTER (OUTPATIENT)
Age: 88
End: 2023-01-10

## 2023-01-10 ENCOUNTER — LABORATORY RESULT (OUTPATIENT)
Age: 88
End: 2023-01-10

## 2023-01-10 ENCOUNTER — NON-APPOINTMENT (OUTPATIENT)
Age: 88
End: 2023-01-10

## 2023-01-10 ENCOUNTER — APPOINTMENT (OUTPATIENT)
Dept: HOME HEALTH SERVICES | Facility: HOME HEALTH | Age: 88
End: 2023-01-10
Payer: MEDICARE

## 2023-01-10 PROCEDURE — 99348 HOME/RES VST EST LOW MDM 30: CPT | Mod: 95

## 2023-01-11 ENCOUNTER — LABORATORY RESULT (OUTPATIENT)
Age: 88
End: 2023-01-11

## 2023-01-11 ENCOUNTER — NON-APPOINTMENT (OUTPATIENT)
Age: 88
End: 2023-01-11

## 2023-01-11 ENCOUNTER — TRANSCRIPTION ENCOUNTER (OUTPATIENT)
Age: 88
End: 2023-01-11

## 2023-01-12 ENCOUNTER — TRANSCRIPTION ENCOUNTER (OUTPATIENT)
Age: 88
End: 2023-01-12

## 2023-01-12 ENCOUNTER — NON-APPOINTMENT (OUTPATIENT)
Age: 88
End: 2023-01-12

## 2023-01-12 NOTE — ASSESSMENT
[FreeTextEntry1] : 88 yr old female w/hx of HTN, CAD, OA, meniere's HLD, presenting today with the following: \par \par Dizziness = multifactorial (HTN, Meniere's, atherosclerosis, etc).  EKG showed moderate bradycardia, likely source of sx.  Reviewed previous EKGs, all similar except HR was progressively lower going forward in time, with today's being the lowest.  Suspecting pt's metoprolol use over time as likely source: pt also w/low BP.  Advised pt to lower Toprol XL to 25mg QD for now.  Will obtain labs to r/o other etiologies.  To F/U in 2 weeks.\par \par HLD = ordering labs\par HTN = over-controlled and pt is sx.  Lowering metoprolol\par CAD = Clinically stable.  No changes in tx at this time.  \par OA, Meniere's = Clinically stable.  No changes in tx at this time. \par \par Counseled patient for greater than 50% of this visit, including diagnoses information, medication usage and side effects, therapeutic goals and options, and followup. Patient's questions were answered. Patient expressed understanding of, and agreement with, assessment and plan. 
done

## 2023-01-23 NOTE — ED ADULT NURSE NOTE - ED STAT RN HANDOFF WHERE
Your Child's Health  Nine-Month-Old Visit    Samir Freddy Brooks Taylor  January 23, American Academy of  Pediatric Dentists recommends cleaning with a small amount (the size of a grain of rice!) of regular (fluoridated) toothpaste as soon as teeth erupt--you can use a washcloth, gauze, or soft toothbrush. Do not share spoons or clean off pacifiers in your mouth--this transfers your germs to your baby and increases their risk of cavities. Fluoride is very important for protection from cavities. Make sure your baby gets some tap water (in their formula or from cooking) to provide it. If you have well water, you should have it tested for fluoride content to determine whether your child might need fluoride supplements.     SAFETY & ACCIDENT PREVENTION  Remember, your baby is cannot really “learn rules” at this age, so try to make your home as safe as possible so you are not always redirecting them away from things they should not be touching.     1. Car Safety:  A rear-facing car seat in the backseat is required by [place] law until 12 months of age. When you replace the infant car seat, buy a convertible car seat so you can keep your baby rear-facing for as long as possible; they are simply safer in a rear-facing position.    2. Burns & Electrical Shocks:  Hot liquids, hot foods, and electrical cords must be kept out of reach. Use outlet protectors and hide extension cords. Your water heater should be set at 120-125°F to prevent scald burns. Keep hot items (irons, curling irons, cookware) out of baby’s reach and make sure they cannot reach pot handles on the stove or electrical cords of hot things (to prevent them from pulling them down on themselves). Make sure you have working smoke and carbon monoxide detectors in your home.   3. Falls: Block stairs with grady. If you keep windows open, use window locks or guards, especially on upstairs windows. Your baby should not be using a walker--at this age, they often lead to accidents (and they do not help them walk). And never leave them alone on a high surface--they will  find a way to scoot or roll their way off of it!  4. Water Safety:  Children can drown in just a couple inches of water, including tubs, play pools, buckets and toilets. Never leave an infant or toddler alone in a tub and do not rely on older children to properly supervise the younger ones. An adult should always be within arm’s reach whenever a young child is in or around water. Permanent pools (in ground and above ground) should be fenced off, and wading pools should be drained when not in use. Keep toilet seat lids down and secured with a safety lock if possible.   5. Poisons and Choking Hazards:  Make sure products like , chemicals and medicines are locked up or stored up high. Keep any small object that your baby would be tempted to put into their mouth (and choke on!) out of reach.  Be aware of what you keep under your bathroom sink and in your laundry room (colorful detergent pods are very tempting to young ones!), as well as what you keep under the kitchen sink.   Make sure purses (belonging to you or any visitors) are out of reach; curious crawling babies can quickly find medicines, cigarettes, and small objects to ingest and choke on.     For all medicines, including vitamins, keep the original safety caps that came with the bottle; do not transfer medicines to non-child-proofed or unlabeled containers. Be especially careful when around older people because they may keep their medicines out in the open or in non-childproofed containers.   Call the Poison Center at 1-117.178.4721 for any known or suspected poisoning (it’s a good idea to put this phone number in your phone for quick reference!).     Magnets and button batteries, in particular, are VERY dangerous if swallowed. In addition to causing dangerous choking spells, they can cause severe internal damage.    6. Smoke Exposure: Do not let anyone smoke around your baby in the house OR in the car. If you smoke and are ready to consider quitting,  In [place], resources include [facility] (927-700-1741) and the [facility] (24 hour hotline is 219- 588-9636); the National Domestic Violence Hotline is 6-305-472-NQLO (6755). If you are pregnant or have a child less than one year old and are experiencing domestic violence in [place], call Safe Mom, Safe Baby at 680-774-1965.     [facility] 18 - 23 pounds:           75 mg (1.875 mL)    CHILDREN'S Ibuprofen (100 mg/5 mL) liquid (for example Advil or Motrin) may be given every 6 hours as needed for pain or fever.    Child’s Weight: Dose:  12 - 17 pounds:           50 mg (2.5 mL (1/2 Teaspoon))  18 - 23 pounds:           75 mg (3.75  mL (3/4 Teaspoon))    If Samir is outside these weight ranges, call your pediatrician's office for advice.    Most Recent Immunizations   Administered Date(s) Administered   • DTaP/Hep B/IPV 2022   • Hep B, Unspecified Formulation 2022   • Hep B, adolescent or pediatric 2022   • Hib (PRP-OMP) 2022   • Pneumococcal Conjugate 13 Valent Vacc (Prevnar 13) 2022   • Rotavirus - pentavalent 2022       If Samir develops any of the following reactions within 72 hours after an immunization, notify your pediatrician by calling the pediatric phone nurse:  1. A temperature of 105 degrees or above.  2. More than 3 hours of continuous crying.  3. A shrill, high-pitched cry.  4. A pale, limp spell.  5. A seizure or fainting spell.  In this case, you should call 911 or go immediately to the emergency room.    NEXT VISIT: ONE YEAR OF AGE    NOTE:  Samir should have the One-Year-Old Exam after his first birthday.  The immunizations given at that visit must take place after Samir’s birthday in order to meet [facility] requirements.    Thank you for entrusting your care to [facility].      Also, check out “Children’s Health” on the [facility] Blog for updates on timely topics regarding children’s health!   joyce N

## 2023-01-26 ENCOUNTER — NON-APPOINTMENT (OUTPATIENT)
Age: 88
End: 2023-01-26

## 2023-02-02 ENCOUNTER — APPOINTMENT (OUTPATIENT)
Dept: HOME HEALTH SERVICES | Facility: HOME HEALTH | Age: 88
End: 2023-02-02

## 2023-03-01 ENCOUNTER — NON-APPOINTMENT (OUTPATIENT)
Age: 88
End: 2023-03-01

## 2023-03-07 ENCOUNTER — APPOINTMENT (OUTPATIENT)
Dept: HOME HEALTH SERVICES | Facility: HOME HEALTH | Age: 88
End: 2023-03-07
Payer: MEDICARE

## 2023-03-07 VITALS
HEART RATE: 66 BPM | DIASTOLIC BLOOD PRESSURE: 60 MMHG | SYSTOLIC BLOOD PRESSURE: 120 MMHG | OXYGEN SATURATION: 99 % | RESPIRATION RATE: 18 BRPM | TEMPERATURE: 98.6 F

## 2023-03-07 DIAGNOSIS — L85.3 XEROSIS CUTIS: ICD-10-CM

## 2023-03-07 PROCEDURE — 99349 HOME/RES VST EST MOD MDM 40: CPT

## 2023-03-07 RX ORDER — NIRMATRELVIR AND RITONAVIR 300-100 MG
20 X 150 MG & KIT ORAL
Qty: 20 | Refills: 0 | Status: COMPLETED | COMMUNITY
Start: 2023-01-12 | End: 2023-03-07

## 2023-03-07 RX ORDER — AZITHROMYCIN 250 MG/1
250 TABLET, FILM COATED ORAL
Qty: 1 | Refills: 0 | Status: COMPLETED | COMMUNITY
Start: 2023-01-10 | End: 2023-03-07

## 2023-03-07 NOTE — CURRENT MEDS
[Medication and Allergies Reconciled] : medication and allergies reconciled [High Risk Medications Reviewed and Reconciled (Beers Criteria)] : high risk medications reviewed and reconciled [Adherent to medications] : Patient is adherent to medications as prescribed [de-identified] : medications managed by patient's daughter

## 2023-03-07 NOTE — COUNSELING
[Normal Weight - ( BMI  <25 )] : normal weight - ( BMI  <25 ) [DASH diet recommended] : DASH diet recommended [Sodium restriction 2gm recommended] : sodium restriction 2 gm recommended [Continue diet as tolerated] : continue diet as tolerated based on goals of care [Non - Smoker] : non-smoker [Smoke/CO Detectors] : smoke/CO detectors [Use assistive device to avoid falls] : use assistive device to avoid falls [Remove clutter and unsafe carpeting to avoid falls] : remove clutter and unsafe carpeting to avoid falls [] : diabetic screening [Improve mobility] : improve mobility [Minimize unnecessary interventions] : minimize unnecessary interventions [Maintain functional ability] : maintain functional ability [Patient/Caregiver has ___ understanding of disease process] : patient/caregiver has [unfilled] understanding of disease process [Completed DNR] : completed DNR [Completed Medical Orders for Life-Sustaining Treatment] : completed medical orders for life-sustaining treatment [DNR] : Code Status: DNR [Limited] : Treatment Guidelines: Limited [DNI] : Intubation: DNI [[facility]: _____] [facility]: [unfilled] [_____] : HCP: [unfilled]

## 2023-03-07 NOTE — HISTORY OF PRESENT ILLNESS
[Patient] : patient [Family Member] : family member [Patient is stable] : patient is stable [FreeTextEntry1] : gait instability [FreeTextEntry2] : COVID-19 Screen - 09/14/2022 \par N95 mask, gloves, eyewear, and gown (if indicated) used during visit: Yes \par Patient or caregiver denies fever, cough, trouble breathing, rash, and vomiting. Patient has not been in close contact with anyone who is COVID-19 positive or suspected of having COVID-19. \par \par PMH: 89 y/o female with pmh of HTN, CAD with stents, HLD, RA, depression, left femur fracture. Patient is being seen for a routine follow up visit. \par \par Patient seen with HHA. Daughter contacted during visit to discuss plan of care, has no current concerns. 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 \par Appetite: stable\par Constipation: sometimes 3 days apart. HHA gives her prunes daily.  Continent of urine and stool. \par pain - denies pain at rest, has some arthritic pain to knees/wrists when she walks. encourage Tylenol.1000mg PO TID, voltaren gel.  Does PT exercises on own. Doesn't need to reorder PT. \par Mood/Behavior: A&O x 4, pleasant. Denies depression, however feels down at times as she is not as mobile. T\par \par Patient lives with grand-daughter and family and has aide to care for her during the day. \par \par

## 2023-03-07 NOTE — CHRONIC CARE ASSESSMENT
[PPS Score: ____] : Palliative Performance Scale (PPS) Score: [unfilled] [Other: ____] : [unfilled] [Can not Exercise (Disability)] : Exercise: The patient can not exercise due to disability [None] : The patient does not exercise [FreeTextEntry1] : regular diet  [de-identified] : continue diet as tolerated

## 2023-03-07 NOTE — REVIEW OF SYSTEMS
[Lower Ext Edema] : lower extremity edema [Constipation] : constipation [Nocturia] : nocturia [Joint Pain] : joint pain [Joint Stiffness] : joint stiffness [Muscle Weakness] : muscle weakness [Depression] : depression [Pain] : no pain [Redness] : no redness [Dryness] : no dryness  [Itching] : no itching [Earache] : no earache [Hearing Loss] : no hearing loss [Nosebleed] : no nosebleeds [Hoarseness] : no hoarseness [Nasal Discharge] : no nasal discharge [Sore Throat] : no sore throat [Postnasal Drip] : no postnasal drip [Chest Pain] : no chest pain [Palpitations] : no palpitations [Leg Claudication] : no leg claudication [Orthopnea] : no orthopnea [Paroxysmal Nocturnal Dyspnea] : no paroxysmal nocturnal dyspnea [Shortness Of Breath] : no shortness of breath [Wheezing] : no wheezing [Cough] : no cough [Dyspnea on Exertion] : no dyspnea on exertion [Abdominal Pain] : no abdominal pain [Nausea] : no nausea [Diarrhea] : diarrhea [Vomiting] : no vomiting [Heartburn] : no heartburn [Dysuria] : no dysuria [Incontinence] : no incontinence [Hematuria] : no hematuria [Frequency] : no frequency [Vaginal Discharge] : no vaginal discharge [Joint Swelling] : no joint swelling [Muscle Pain] : no muscle pain [Back Pain] : no back pain [Negative] : Respiratory [FreeTextEntry3] : wears glasses

## 2023-03-07 NOTE — PHYSICAL EXAM
[No Acute Distress] : no acute distress [Well Nourished] : well nourished [Well Developed] : well developed [Normal Sclera/Conjunctiva] : normal sclera/conjunctiva [Normal Outer Ear/Nose] : the ears and nose were normal in appearance [Normal Oropharynx] : the oropharynx was normal [Normal TMs] : both tympanic membranes were normal [No JVD] : no jugular venous distention [Supple] : the neck was supple [No LAD] : no lymphadenopathy [No Respiratory Distress] : no respiratory distress [Clear to Auscultation] : lungs were clear to auscultation bilaterally [No Accessory Muscle Use] : no accessory muscle use [Normal Rate] : heart rate was normal  [Regular Rhythm] : with a regular rhythm [Normal S1, S2] : normal S1 and S2 [No Murmurs] : no murmurs heard [Normal Bowel Sounds] : normal bowel sounds [Non Tender] : non-tender [Soft] : abdomen soft [Not Distended] : not distended [No Hernias] : no hernia was discovered [No Joint Swelling] : no joint swelling seen [No Rash] : no rash [No Skin Lesions] : no skin lesions [Cranial Nerves Intact] : cranial nerves 2-12 were intact [No Motor Deficits] : the motor exam was normal [No Gross Sensory Deficits] : no gross sensory deficits [Oriented x3] : oriented to person, place, and time [Normal Affect] : the affect was normal [Normal Mood] : the mood was normal [Breast Exam Declined] : patient declined to have breast exam done [Patient Refused] : rectal exam was refused by the patient [Kyphosis] : no kyphosis present [Acne] : no acne [de-identified] : ambulates with walker

## 2023-04-04 ENCOUNTER — NON-APPOINTMENT (OUTPATIENT)
Age: 88
End: 2023-04-04

## 2023-04-10 ENCOUNTER — APPOINTMENT (OUTPATIENT)
Dept: HOME HEALTH SERVICES | Facility: HOME HEALTH | Age: 88
End: 2023-04-10

## 2023-04-25 ENCOUNTER — RX RENEWAL (OUTPATIENT)
Age: 88
End: 2023-04-25

## 2023-05-09 ENCOUNTER — RX RENEWAL (OUTPATIENT)
Age: 88
End: 2023-05-09

## 2023-05-15 NOTE — ED PROVIDER NOTE - NEUROLOGICAL, MLM
SLEEP EVALUATION NOTE     Damien Dang is a 63 year old male presenting for evaluation of: Sleep Problem, Office Visit, and Consultation     .    Referring provider: Roberto Orlando MD  PCP: Roberto Orlando MD       HPI   PT PRESENTS TO SLEEP CENTER :  Admits to stop using his CPAP machine a year ago. He is looking to be retested for a new device. Pt admits to weight loss of 20 lbs since last sleep study which was in 2018. He is retired now but has issues with nocturia. He is fatigued during the day, snoring and having apnea episodes.     Spoke with patient 7/21/2023 and pt now states that his last cpap machine is 17 years old.     Bedtime: 10p  Awake time: 3752-7051  Sleep position: all sides  Sleep onset latency: less than 15 min   Sleeping aid medications: No  Awakenings # and episodes of nocturia: 4-5    [x]Snoring  [x]Witnessed apneas  []Dyspneic arousal  []Morning dry mouth  []Morning headache    [x]Non-restorative sleep  [x]Excessive daytime sleepiness or tired during the day  [x]Naps    []Urge to move legs and/or uncomfortable tingling or burning in legs  []Cramping or jerking of the legs during sleep    []Cataplexy  []Sleep paralysis  []Hypnogognic or hypnopompnic hallucinations  []Sleep attacks    []Parasomnias:    STOP BANG SCREENING  STOP  S: Do you snore loudly?: Yes  T: Do you often feel tired, fatigued or sleepy during daytime?: Yes  O: Has anyone observed you stop breathing during your sleep?: Yes  P: Do you have or are you being treated for high blood pressure?: Yes  BANG  B: BMI more than 35 kg/m2?: No  A: Is age over 50 years old?: Yes  N: Neck circumfrence >16 inches (40 cm)?: Yes  G: Is gender Male?: Yes  Total Score  Stop Bang Total Score (out of 8): 7    PRIOR SLEEP STUDY DATE: 2/26/2018  RESULTS: 1. AHI:54.3                   2. DESATURATION ANNABELLE:75%                 CURRENT CO-MORBIDITIES:    [x] HTN     [] AF/ARRHYTHMIA    [] HYPOTHYROIDISM     [] CAD     [x] OBESITY                   [] INSOMNIA  [] CHF     [] DM                         [] STROKE/TIA  [x]OTHER:  BPH. GERD , HL,  GOUT , YURIDIA,      Mayer Sleepiness Scale:     Mayer Score     Mayer total score    3           Past Medical History:     Past Medical History:   Diagnosis Date   • Anemia    • Arthritis    • Essential (primary) hypertension    • Sleep apnea     uses cpap     Past Surgical History:   Procedure Laterality Date   • Joint replacement     • Knee surgery       Family History   Problem Relation Age of Onset   • COPD Mother    • Cancer Father    • Asthma Paternal Aunt    • Cancer, Lung Neg Hx    • Emphysema Neg Hx      Social History     Tobacco Use   • Smoking status: Former     Current packs/day: 0.00   • Smokeless tobacco: Never   Vaping Use   • Vaping status: never used   Substance Use Topics   • Alcohol use: Yes     Alcohol/week: 1.0 standard drink of alcohol     Types: 1 Glasses of wine per week   • Drug use: No       ALLERGIES:  No Known Allergies  Current Outpatient Medications   Medication Sig   • DayVigo 5 MG Tab Take 1 tablet by mouth at bedtime.   • sildenafil (VIAGRA) 100 MG tablet Take 1 tablet p.o. every 24 hours as needed for erectile dysfunction.   • hydroCORTisone (ANUSOL-HC) 25 MG suppository Place 1 suppository rectally in the morning and 1 suppository in the evening.   • allopurinol (ZYLOPRIM) 300 MG tablet Take 1 tablet by mouth daily.   • atorvastatin (LIPITOR) 40 MG tablet Take 1 tablet by mouth daily.   • finasteride (PROSCAR) 5 MG tablet Take 1 tablet by mouth daily.   • NIFEdipine CC (ADALAT CC) 60 MG 24 hr tablet Take 1 tablet by mouth daily.   • tamsulosin (FLOMAX) 0.4 MG Cap Take 2 capsules by mouth daily.   • valsartan-hydrochlorothiazide (DIOVAN-HCT) 160-25 MG per tablet Take 1 tablet by mouth daily.   • pantoprazole (PROTONIX) 40 MG tablet Take 1 tablet by mouth daily.   • carvedilol (COREG) 12.5 MG tablet Take 1 tablet by mouth in the morning and 1 tablet in the evening. Take  with meals.   • aspirin (ADULT ASPIRIN EC LOW STRENGTH) 81 MG EC tablet TAKE 1 TABLET DAILY     No current facility-administered medications for this visit.          Review of Systems:     Review of Systems   Constitutional: Negative for chills, fever and weight loss.   HENT: Negative for congestion and sore throat.    Eyes: Negative for blurred vision and photophobia.   Cardiovascular: Negative for chest pain and palpitations.   Respiratory: Positive for sleep disturbances due to breathing and snoring.    Skin: Negative for rash.   Musculoskeletal: Negative for joint swelling, muscle weakness and neck pain.   Gastrointestinal: Negative for diarrhea, nausea and vomiting.   Genitourinary: Positive for nocturia.   Neurological: Positive for excessive daytime sleepiness. Negative for numbness and paresthesias.        Physical Examination:     Vitals:    05/15/23 0948   BP: 120/78   Pulse: 79   SpO2: 99%   Weight: 92.9 kg (204 lb 12.9 oz)   Height: 5' 6\" (1.676 m)     Body mass index is 33.06 kg/m².      Neck Circumference: 16.5 in  Nasal Passages: [x]Clear   [] Congested  Palate: Maher [] I  [] II   [x] III   [] IV              E[place]ther[place]/edema [x]none []+1  []+2  []+3  []+4    Gen: No acute distress. Pleasant and interactive.  Head:  Normocephalic and atraumatic.  Eyes: Conjunctiva and sclera normal.   Heart:  Normal rate and regular rhythm, no murmur.  Lungs:  Normal respiratory rate and effort, breath sounds equal.  Extremities:  No edema in lower extremities.   Skin: Warm and dry, no rash.  Musculoskeletal:  No joint swelling or tenderness  Psych:  Affect was appropriate to situation and mood was normal.  Neuro:  Alert and oriented, attention and recall preserved, cranial nerves intact, motor strength preserved, coordination intact, gait normal.     Assessment and Plan:     PROBLEMS ADDRESSED THIS VISIT:  Problem List Items Addressed This Visit    None  Visit Diagnoses     Snoring    -  Primary    Obstructive  sleep apnea (adult) (pediatric)               • Comments: Based on the patient's medical history and symptoms, a?over night?sleep study was ordered for this patient. All questions and concerns were answered during this visit. The patient is advised to call the office for any questions or concerns.   Follow-up after 1-2??week post sleep study    PLAN & Patient Counseling:     • We reviewed the nature of sleep apnea, the elevated cardiovascular risks and other health consequences associated with this condition and reviewed treatment options in detail.  • Pt. to undergo polysomnogram .  • The patient was cautioned not to drive while sleepy.  •   •   I spent a total of 30 minutes on the day of the visit.  This includes pre-charting, chart review and documenting.        KIESHA Wright, FNP-C     Alert and oriented, no focal deficits, no motor or sensory deficits.
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
## 2023-05-26 ENCOUNTER — APPOINTMENT (OUTPATIENT)
Dept: HOME HEALTH SERVICES | Facility: HOME HEALTH | Age: 88
End: 2023-05-26
Payer: MEDICARE

## 2023-05-26 VITALS
SYSTOLIC BLOOD PRESSURE: 120 MMHG | RESPIRATION RATE: 18 BRPM | HEART RATE: 60 BPM | DIASTOLIC BLOOD PRESSURE: 80 MMHG | OXYGEN SATURATION: 98 % | TEMPERATURE: 97.4 F

## 2023-05-26 DIAGNOSIS — I25.10 ATHEROSCLEROTIC HEART DISEASE OF NATIVE CORONARY ARTERY W/OUT ANGINA PECTORIS: ICD-10-CM

## 2023-05-26 DIAGNOSIS — M48.00 SPINAL STENOSIS, SITE UNSPECIFIED: ICD-10-CM

## 2023-05-26 DIAGNOSIS — Z87.898 PERSONAL HISTORY OF OTHER SPECIFIED CONDITIONS: ICD-10-CM

## 2023-05-26 DIAGNOSIS — Z87.2 PERSONAL HISTORY OF DISEASES OF THE SKIN AND SUBCUTANEOUS TISSUE: ICD-10-CM

## 2023-05-26 PROCEDURE — 99349 HOME/RES VST EST MOD MDM 40: CPT

## 2023-05-26 NOTE — CHRONIC CARE ASSESSMENT
[PPS Score: ____] : Palliative Performance Scale (PPS) Score: [unfilled] [Other: ____] : [unfilled] [Can not Exercise (Disability)] : Exercise: The patient can not exercise due to disability [None] : The patient does not exercise [FreeTextEntry1] : regular diet  [de-identified] : continue diet as tolerated

## 2023-05-26 NOTE — HISTORY OF PRESENT ILLNESS
[Patient is stable] : patient is stable [Patient] : patient [Family Member] : family member [FreeTextEntry1] : gait instability [FreeTextEntry2] : COVID-19 Screen - 09/14/2022 \par N95 mask, gloves, eyewear, and gown (if indicated) used during visit: Yes \par Patient or caregiver denies fever, cough, trouble breathing, rash, and vomiting. Patient has not been in close contact with anyone who is COVID-19 positive or suspected of having COVID-19. \par \par PMH: 89 y/o female with pmh of HTN, CAD with stents, HLD, RA, depression, left femur fracture. Patient is being seen for a routine follow up visit and management of OA/RA and CAD\par \par Patient seen with HHA. Daughter contacted during visit to discuss plan of care, has no current concerns. \par patient seen alert/awake sitting in chair in no acute distress.\par \par Interval Events\par - Bilateral knee and arms chronic arthritic pain L>R - continue Diclofenac gel \par - offered PT evaluation but pt refuses at this time\par \par Ambulation - ambulates with walker and one person assist. \par Appetite: stable\par Constipation: sometimes 3 days apart. HHA gives her prunes daily.  Continent of urine and stool. \par pain - denies pain at rest, has some arthritic pain to knees/wrists when she walks. encourage Tylenol.1000mg PO TID, voltaren gel.  Does PT exercises on own. Doesn't need to reorder PT. \par Mood/Behavior: A&O x 4, pleasant. Denies depression, however feels down at times as she is not as mobile. T\par \par Patient lives with grand-daughter and family and has aide to care for her during the day. \par \par

## 2023-05-26 NOTE — CURRENT MEDS
[Medication and Allergies Reconciled] : medication and allergies reconciled [High Risk Medications Reviewed and Reconciled (Beers Criteria)] : high risk medications reviewed and reconciled [Adherent to medications] : Patient is adherent to medications as prescribed [de-identified] : medications managed by patient's daughter

## 2023-05-26 NOTE — PHYSICAL EXAM
[No Acute Distress] : no acute distress [Well Nourished] : well nourished [Well Developed] : well developed [Normal Sclera/Conjunctiva] : normal sclera/conjunctiva [Normal Outer Ear/Nose] : the ears and nose were normal in appearance [Normal Oropharynx] : the oropharynx was normal [Normal TMs] : both tympanic membranes were normal [No JVD] : no jugular venous distention [Supple] : the neck was supple [No LAD] : no lymphadenopathy [No Respiratory Distress] : no respiratory distress [Clear to Auscultation] : lungs were clear to auscultation bilaterally [No Accessory Muscle Use] : no accessory muscle use [Normal Rate] : heart rate was normal  [Regular Rhythm] : with a regular rhythm [Normal S1, S2] : normal S1 and S2 [No Murmurs] : no murmurs heard [Breast Exam Declined] : patient declined to have breast exam done [Normal Bowel Sounds] : normal bowel sounds [Non Tender] : non-tender [Soft] : abdomen soft [Not Distended] : not distended [No Hernias] : no hernia was discovered [Patient Refused] : rectal exam was refused by the patient [No Joint Swelling] : no joint swelling seen [No Rash] : no rash [No Skin Lesions] : no skin lesions [Cranial Nerves Intact] : cranial nerves 2-12 were intact [No Motor Deficits] : the motor exam was normal [No Gross Sensory Deficits] : no gross sensory deficits [Oriented x3] : oriented to person, place, and time [Normal Affect] : the affect was normal [Normal Mood] : the mood was normal [Kyphosis] : no kyphosis present [Acne] : no acne [de-identified] : ambulates with walker

## 2023-05-26 NOTE — HEALTH RISK ASSESSMENT
[HRA Reviewed] : Health risk assessment reviewed [Independent] : feeding [Some assistance needed] : managing finances [Full assistance needed] : managing medications [No] : The patient does not have visual impairment [No falls in past year] : Patient reported no falls in the past year [TimeGetUpGo] : 28

## 2023-05-26 NOTE — COUNSELING
[Normal Weight - ( BMI  <25 )] : normal weight - ( BMI  <25 ) [DASH diet recommended] : DASH diet recommended [Sodium restriction 2gm recommended] : sodium restriction 2 gm recommended [Continue diet as tolerated] : continue diet as tolerated based on goals of care [Non - Smoker] : non-smoker [Smoke/CO Detectors] : smoke/CO detectors [Use assistive device to avoid falls] : use assistive device to avoid falls [Remove clutter and unsafe carpeting to avoid falls] : remove clutter and unsafe carpeting to avoid falls [] : diabetic screening [Improve mobility] : improve mobility [Minimize unnecessary interventions] : minimize unnecessary interventions [Maintain functional ability] : maintain functional ability [Patient/Caregiver has ___ understanding of disease process] : patient/caregiver has [unfilled] understanding of disease process [Completed DNR] : completed DNR [Completed Medical Orders for Life-Sustaining Treatment] : completed medical orders for life-sustaining treatment [DNR] : Code Status: DNR [Limited] : Treatment Guidelines: Limited [DNI] : Intubation: DNI [[facility]: _____] [facility]/last verification date: [unfilled] [_____] : HCP: [unfilled]

## 2023-06-02 NOTE — OCCUPATIONAL THERAPY INITIAL EVALUATION ADULT - WEIGHT-BEARING RESTRICTIONS: STAND/SIT, REHAB EVAL
LLE/nonweight-bearing Decreased appetite/Wants to speak to a dietitian about his/her therapeutic diet

## 2023-06-05 ENCOUNTER — FORM ENCOUNTER (OUTPATIENT)
Age: 88
End: 2023-06-05

## 2023-06-12 ENCOUNTER — RX RENEWAL (OUTPATIENT)
Age: 88
End: 2023-06-12

## 2023-06-15 ENCOUNTER — FORM ENCOUNTER (OUTPATIENT)
Age: 88
End: 2023-06-15

## 2023-07-06 ENCOUNTER — NON-APPOINTMENT (OUTPATIENT)
Age: 88
End: 2023-07-06

## 2023-07-10 ENCOUNTER — APPOINTMENT (OUTPATIENT)
Dept: HOME HEALTH SERVICES | Facility: HOME HEALTH | Age: 88
End: 2023-07-10

## 2023-08-10 ENCOUNTER — APPOINTMENT (OUTPATIENT)
Dept: HOME HEALTH SERVICES | Facility: HOME HEALTH | Age: 88
End: 2023-08-10
Payer: MEDICARE

## 2023-08-10 VITALS
RESPIRATION RATE: 18 BRPM | SYSTOLIC BLOOD PRESSURE: 120 MMHG | TEMPERATURE: 97.6 F | HEART RATE: 60 BPM | OXYGEN SATURATION: 98 % | DIASTOLIC BLOOD PRESSURE: 70 MMHG

## 2023-08-10 PROCEDURE — 99349 HOME/RES VST EST MOD MDM 40: CPT

## 2023-08-10 NOTE — PHYSICAL EXAM
[No Acute Distress] : no acute distress [Well Nourished] : well nourished [Well Developed] : well developed [Normal Sclera/Conjunctiva] : normal sclera/conjunctiva [Normal Outer Ear/Nose] : the ears and nose were normal in appearance [Normal Oropharynx] : the oropharynx was normal [Normal TMs] : both tympanic membranes were normal [No JVD] : no jugular venous distention [Supple] : the neck was supple [No LAD] : no lymphadenopathy [No Respiratory Distress] : no respiratory distress [Clear to Auscultation] : lungs were clear to auscultation bilaterally [No Accessory Muscle Use] : no accessory muscle use [Normal Rate] : heart rate was normal  [Regular Rhythm] : with a regular rhythm [Normal S1, S2] : normal S1 and S2 [No Murmurs] : no murmurs heard [Breast Exam Declined] : patient declined to have breast exam done [Normal Bowel Sounds] : normal bowel sounds [Non Tender] : non-tender [Soft] : abdomen soft [Not Distended] : not distended [No Hernias] : no hernia was discovered [Patient Refused] : rectal exam was refused by the patient [No Joint Swelling] : no joint swelling seen [No Rash] : no rash [No Skin Lesions] : no skin lesions [Cranial Nerves Intact] : cranial nerves 2-12 were intact [No Motor Deficits] : the motor exam was normal [No Gross Sensory Deficits] : no gross sensory deficits [Oriented x3] : oriented to person, place, and time [Normal Affect] : the affect was normal [Normal Mood] : the mood was normal [Kyphosis] : no kyphosis present [Acne] : no acne [de-identified] : ambulates with walker

## 2023-08-10 NOTE — HEALTH RISK ASSESSMENT
[HRA Reviewed] : Health risk assessment reviewed [Independent] : feeding [Some assistance needed] : managing finances [Full assistance needed] : managing medications [No falls in past year] : Patient reported no falls in the past year [No] : The patient does not have visual impairment [TimeGetUpGo] : 28

## 2023-08-10 NOTE — CURRENT MEDS
[Medication and Allergies Reconciled] : medication and allergies reconciled [High Risk Medications Reviewed and Reconciled (Beers Criteria)] : high risk medications reviewed and reconciled [Adherent to medications] : Patient is adherent to medications as prescribed [de-identified] : medications managed by patient's daughter

## 2023-08-10 NOTE — CHRONIC CARE ASSESSMENT
[PPS Score: ____] : Palliative Performance Scale (PPS) Score: [unfilled] [Other: ____] : [unfilled] [Can not Exercise (Disability)] : Exercise: The patient can not exercise due to disability [None] : The patient does not exercise [FreeTextEntry1] : regular diet  [de-identified] : continue diet as tolerated

## 2023-08-10 NOTE — COUNSELING
[Normal Weight - ( BMI  <25 )] : normal weight - ( BMI  <25 ) [DASH diet recommended] : DASH diet recommended [Sodium restriction 2gm recommended] : sodium restriction 2 gm recommended [Continue diet as tolerated] : continue diet as tolerated based on goals of care [Non - Smoker] : non-smoker [Smoke/CO Detectors] : smoke/CO detectors [Use assistive device to avoid falls] : use assistive device to avoid falls [Remove clutter and unsafe carpeting to avoid falls] : remove clutter and unsafe carpeting to avoid falls [] : diabetic screening [Improve mobility] : improve mobility [Minimize unnecessary interventions] : minimize unnecessary interventions [Maintain functional ability] : maintain functional ability [Patient/Caregiver has ___ understanding of disease process] : patient/caregiver has [unfilled] understanding of disease process [Completed DNR] : completed DNR [Completed Medical Orders for Life-Sustaining Treatment] : completed medical orders for life-sustaining treatment [DNR] : Code Status: DNR [Limited] : Treatment Guidelines: Limited [DNI] : Intubation: DNI [[facility]: _____[facility] Com[facility]/last verification date: [unfilled] [_____] : HCP: [unfilled]

## 2023-08-10 NOTE — HISTORY OF PRESENT ILLNESS
[Patient is stable] : patient is stable [Patient] : patient [Family Member] : family member [FreeTextEntry1] : gait instability [FreeTextEntry2] : COVID-19 Screen - 09/14/2022  N95 mask, gloves, eyewear, and gown (if indicated) used during visit: Yes  Patient or caregiver denies fever, cough, trouble breathing, rash, and vomiting. Patient has not been in close contact with anyone who is COVID-19 positive or suspected of having COVID-19.   PMH: 89 y/o female with pmh of HTN, CAD with stents, HLD, RA, depression, left femur fracture. Patient is being seen for a routine follow up visit and management of OA/RA and CAD  Patient seen with HHA. Daughter contacted during visit to discuss plan of care, has no current concerns.  patient seen alert/awake sitting in chair in no acute distress.  Interval Events - Pt c/o right shoulder pain - Xray of right shoulder, pain patch  Ambulation - ambulates with walker and one person assist.  Appetite: stable Constipation: sometimes 3 days apart. A gives her prunes daily.  Continent of urine and stool.   Patient lives with grand-daughter and family and has aide to care for her during the day.

## 2023-08-21 ENCOUNTER — NON-APPOINTMENT (OUTPATIENT)
Age: 88
End: 2023-08-21

## 2023-09-22 ENCOUNTER — RX RENEWAL (OUTPATIENT)
Age: 88
End: 2023-09-22

## 2023-09-28 ENCOUNTER — NON-APPOINTMENT (OUTPATIENT)
Age: 88
End: 2023-09-28

## 2023-10-03 ENCOUNTER — APPOINTMENT (OUTPATIENT)
Dept: HOME HEALTH SERVICES | Facility: HOME HEALTH | Age: 88
End: 2023-10-03

## 2023-11-06 ENCOUNTER — APPOINTMENT (OUTPATIENT)
Dept: HOME HEALTH SERVICES | Facility: HOME HEALTH | Age: 88
End: 2023-11-06
Payer: MEDICARE

## 2023-11-06 VITALS
SYSTOLIC BLOOD PRESSURE: 120 MMHG | TEMPERATURE: 97.6 F | OXYGEN SATURATION: 99 % | HEART RATE: 68 BPM | RESPIRATION RATE: 18 BRPM | DIASTOLIC BLOOD PRESSURE: 80 MMHG

## 2023-11-06 DIAGNOSIS — Z23 ENCOUNTER FOR IMMUNIZATION: ICD-10-CM

## 2023-11-06 DIAGNOSIS — I10 ESSENTIAL (PRIMARY) HYPERTENSION: ICD-10-CM

## 2023-11-06 PROCEDURE — 99349 HOME/RES VST EST MOD MDM 40: CPT | Mod: 25

## 2023-11-06 PROCEDURE — 90662 IIV NO PRSV INCREASED AG IM: CPT

## 2023-11-06 PROCEDURE — G0008: CPT

## 2024-01-10 ENCOUNTER — APPOINTMENT (OUTPATIENT)
Dept: HOME HEALTH SERVICES | Facility: HOME HEALTH | Age: 89
End: 2024-01-10
Payer: MEDICARE

## 2024-01-10 VITALS
TEMPERATURE: 97.4 F | OXYGEN SATURATION: 98 % | RESPIRATION RATE: 16 BRPM | SYSTOLIC BLOOD PRESSURE: 120 MMHG | DIASTOLIC BLOOD PRESSURE: 80 MMHG | HEART RATE: 64 BPM

## 2024-01-10 DIAGNOSIS — K59.09 OTHER CONSTIPATION: ICD-10-CM

## 2024-01-10 PROCEDURE — 99349 HOME/RES VST EST MOD MDM 40: CPT

## 2024-01-10 RX ORDER — POLYETHYLENE GLYCOL 3350
POWDER (GRAM) MISCELLANEOUS
Qty: 1 | Refills: 3 | Status: COMPLETED | COMMUNITY
Start: 2022-03-16 | End: 2024-01-10

## 2024-01-10 NOTE — HISTORY OF PRESENT ILLNESS
[Patient is stable] : patient is stable [Patient] : patient [Family Member] : family member [FreeTextEntry1] : gait instability [FreeTextEntry2] : COVID-19 Screen - 09/14/2022  N95 mask, gloves, eyewear, and gown (if indicated) used during visit: Yes  Patient or caregiver denies fever, cough, trouble breathing, rash, and vomiting. Patient has not been in close contact with anyone who is COVID-19 positive or suspected of having COVID-19.   PMH: 89 y/o female with pmh of HTN, CAD with stents, HLD, RA, depression, left femur fracture. Patient is being seen for a routine follow up visit.  Patient seen with HHA. Daughter contacted during visit to discuss plan of care, has no current concerns.  patient seen alert/awake sitting in chair in no acute distress.  Interval Events - Pt c/o constipation - Miralax for the constipation - c/o ongoing arthritic pain of bilateral knee and arms - continue diclofenac gel, TYlenol for pain   Ambulation - ambulates with walker and one person assist.   Subjective Appetite: stable Constipation: sometimes 3 days apart. HHA gives her prunes daily.  Continent of urine and stool. added Miralax pain - denies pain at rest, has some arthritic pain to knees/wrists when she walks. encourage Tylenol.1000mg PO TID, voltaren gel.  Does PT exercises on own.  Mood/Behavior: A&O x 3, pleasant. Denies depression, however feels down at times as she is not as mobile. Misses her son that passed away a few years ago secondary to COVID.  Patient lives with grand-daughter and family and has aide to care for her during the day.

## 2024-01-10 NOTE — CHRONIC CARE ASSESSMENT
[PPS Score: ____] : Palliative Performance Scale (PPS) Score: [unfilled] [Other: ____] : [unfilled] [Can not Exercise (Disability)] : Exercise: The patient can not exercise due to disability [None] : The patient does not exercise [FreeTextEntry1] : regular diet  [de-identified] : continue diet as tolerated

## 2024-01-10 NOTE — CURRENT MEDS
[Medication and Allergies Reconciled] : medication and allergies reconciled [High Risk Medications Reviewed and Reconciled (Beers Criteria)] : high risk medications reviewed and reconciled [Adherent to medications] : Patient is adherent to medications as prescribed [de-identified] : medications managed by patient's daughter

## 2024-01-10 NOTE — PHYSICAL EXAM
[No Acute Distress] : no acute distress [Well Nourished] : well nourished [Well Developed] : well developed [Normal Sclera/Conjunctiva] : normal sclera/conjunctiva [Normal Outer Ear/Nose] : the ears and nose were normal in appearance [Normal Oropharynx] : the oropharynx was normal [Normal TMs] : both tympanic membranes were normal [No JVD] : no jugular venous distention [Supple] : the neck was supple [No LAD] : no lymphadenopathy [No Respiratory Distress] : no respiratory distress [Clear to Auscultation] : lungs were clear to auscultation bilaterally [No Accessory Muscle Use] : no accessory muscle use [Normal Rate] : heart rate was normal  [Regular Rhythm] : with a regular rhythm [Normal S1, S2] : normal S1 and S2 [No Murmurs] : no murmurs heard [Breast Exam Declined] : patient declined to have breast exam done [Normal Bowel Sounds] : normal bowel sounds [Non Tender] : non-tender [Soft] : abdomen soft [Not Distended] : not distended [No Hernias] : no hernia was discovered [Patient Refused] : rectal exam was refused by the patient [No Joint Swelling] : no joint swelling seen [No Rash] : no rash [No Skin Lesions] : no skin lesions [Cranial Nerves Intact] : cranial nerves 2-12 were intact [No Motor Deficits] : the motor exam was normal [No Gross Sensory Deficits] : no gross sensory deficits [Oriented x3] : oriented to person, place, and time [Normal Affect] : the affect was normal [Normal Mood] : the mood was normal [Kyphosis] : no kyphosis present [Acne] : no acne [de-identified] : Pleasant  [de-identified] : Mild lower leg edema [de-identified] : ambulates with walker

## 2024-01-10 NOTE — COUNSELING
[Normal Weight - ( BMI  <25 )] : normal weight - ( BMI  <25 ) [DASH diet recommended] : DASH diet recommended [Sodium restriction 2gm recommended] : sodium restriction 2 gm recommended [Continue diet as tolerated] : continue diet as tolerated based on goals of care [Non - Smoker] : non-smoker [Smoke/CO Detectors] : smoke/CO detectors [Use assistive device to avoid falls] : use assistive device to avoid falls [Remove clutter and unsafe carpeting to avoid falls] : remove clutter and unsafe carpeting to avoid falls [] : diabetic screening [Improve mobility] : improve mobility [Minimize unnecessary interventions] : minimize unnecessary interventions [Maintain functional ability] : maintain functional ability [Patient/Caregiver has ___ understanding of disease process] : patient/caregiver has [unfilled] understanding of disease process [Completed DNR] : completed DNR [Completed Medical Orders for Life-Sustaining Treatment] : completed medical orders for life-sustaining treatment [DNR] : Code Status: DNR [Limited] : Treatment Guidelines: Limited [DNI] : Intubation: DNI [[facility] Date: _____] : [facility] Completion/last verification date: [unfilled] [_____] : HCP: [unfilled]

## 2024-01-16 NOTE — DISCHARGE NOTE PROVIDER - NSDCFUADDINST_GEN_ALL_CORE_FT
Please follow up with your Doctor after your discharge from Subacute Rehab (2 weeks, call for appointment).  PT-Non weight bearing Left Lower Extremity.  Keep dressing clean, dry and intact.  Have doctor remove any staples/sutures postop day 14 (if applicable), and apply steristrips as needed.  Please follow up with your PMD 1 month after your hospital discharge.  Initial (On Arrival)

## 2024-01-22 ENCOUNTER — RX RENEWAL (OUTPATIENT)
Age: 89
End: 2024-01-22

## 2024-02-09 ENCOUNTER — NON-APPOINTMENT (OUTPATIENT)
Age: 89
End: 2024-02-09

## 2024-03-28 ENCOUNTER — NON-APPOINTMENT (OUTPATIENT)
Age: 89
End: 2024-03-28

## 2024-03-29 ENCOUNTER — APPOINTMENT (OUTPATIENT)
Dept: HOME HEALTH SERVICES | Facility: HOME HEALTH | Age: 89
End: 2024-03-29

## 2024-03-29 VITALS
RESPIRATION RATE: 17 BRPM | TEMPERATURE: 98.1 F | OXYGEN SATURATION: 97 % | SYSTOLIC BLOOD PRESSURE: 114 MMHG | DIASTOLIC BLOOD PRESSURE: 80 MMHG | HEART RATE: 65 BPM

## 2024-03-29 RX ORDER — AMMONIUM LACTATE 12 %
12 CREAM (GRAM) TOPICAL TWICE DAILY
Qty: 1 | Refills: 3 | Status: ACTIVE | COMMUNITY
Start: 2023-03-07

## 2024-03-29 RX ORDER — POLYETHYLENE GLYCOL 3350 17 G/17G
17 POWDER, FOR SOLUTION ORAL
Qty: 1 | Refills: 5 | Status: ACTIVE | COMMUNITY
Start: 2023-02-02

## 2024-03-29 RX ORDER — DICLOFENAC SODIUM 1% 10 MG/G
1 GEL TOPICAL
Qty: 1 | Refills: 5 | Status: ACTIVE | COMMUNITY
Start: 2022-03-16

## 2024-03-31 ENCOUNTER — NON-APPOINTMENT (OUTPATIENT)
Age: 89
End: 2024-03-31

## 2024-03-31 LAB
25(OH)D3 SERPL-MCNC: 43.6 NG/ML
ALBUMIN SERPL ELPH-MCNC: 3.9 G/DL
ALP BLD-CCNC: 108 U/L
ALT SERPL-CCNC: 24 U/L
ANION GAP SERPL CALC-SCNC: 14 MMOL/L
AST SERPL-CCNC: 38 U/L
BASOPHILS # BLD AUTO: 0.04 K/UL
BASOPHILS NFR BLD AUTO: 1 %
BILIRUB SERPL-MCNC: 0.9 MG/DL
BUN SERPL-MCNC: 11 MG/DL
CALCIUM SERPL-MCNC: 9.1 MG/DL
CHLORIDE SERPL-SCNC: 102 MMOL/L
CHOLEST SERPL-MCNC: 151 MG/DL
CO2 SERPL-SCNC: 23 MMOL/L
CREAT SERPL-MCNC: 1.06 MG/DL
EGFR: 49 ML/MIN/1.73M2
EOSINOPHIL # BLD AUTO: 0.14 K/UL
EOSINOPHIL NFR BLD AUTO: 3.6 %
GLUCOSE SERPL-MCNC: 101 MG/DL
HCT VFR BLD CALC: 36.8 %
HDLC SERPL-MCNC: 64 MG/DL
HGB BLD-MCNC: 12 G/DL
IMM GRANULOCYTES NFR BLD AUTO: 0.3 %
INFLUENZA A RESULT: NOT DETECTED
INFLUENZA B RESULT: NOT DETECTED
LDLC SERPL CALC-MCNC: 72 MG/DL
LYMPHOCYTES # BLD AUTO: 1.07 K/UL
LYMPHOCYTES NFR BLD AUTO: 27.3 %
MAN DIFF?: NORMAL
MCHC RBC-ENTMCNC: 29.5 PG
MCHC RBC-ENTMCNC: 32.6 GM/DL
MCV RBC AUTO: 90.4 FL
MONOCYTES # BLD AUTO: 0.34 K/UL
MONOCYTES NFR BLD AUTO: 8.7 %
NEUTROPHILS # BLD AUTO: 2.32 K/UL
NEUTROPHILS NFR BLD AUTO: 59.1 %
NONHDLC SERPL-MCNC: 87 MG/DL
PLATELET # BLD AUTO: 248 K/UL
POTASSIUM SERPL-SCNC: 4.3 MMOL/L
PROT SERPL-MCNC: 6.3 G/DL
RBC # BLD: 4.07 M/UL
RBC # FLD: 12.7 %
RESP SYN VIRUS RESULT: NOT DETECTED
SARS-COV-2 RESULT: NOT DETECTED
SODIUM SERPL-SCNC: 139 MMOL/L
TRIGL SERPL-MCNC: 77 MG/DL
WBC # FLD AUTO: 3.92 K/UL

## 2024-04-01 ENCOUNTER — LABORATORY RESULT (OUTPATIENT)
Age: 89
End: 2024-04-01

## 2024-04-04 RX ORDER — CIPROFLOXACIN HYDROCHLORIDE 250 MG/1
250 TABLET, FILM COATED ORAL
Qty: 14 | Refills: 0 | Status: ACTIVE | COMMUNITY
Start: 2018-07-02 | End: 1900-01-01

## 2024-04-22 ENCOUNTER — APPOINTMENT (OUTPATIENT)
Dept: HOME HEALTH SERVICES | Facility: HOME HEALTH | Age: 89
End: 2024-04-22
Payer: MEDICARE

## 2024-04-22 VITALS
SYSTOLIC BLOOD PRESSURE: 120 MMHG | RESPIRATION RATE: 16 BRPM | TEMPERATURE: 97.6 F | DIASTOLIC BLOOD PRESSURE: 60 MMHG | HEART RATE: 78 BPM | OXYGEN SATURATION: 98 %

## 2024-04-22 DIAGNOSIS — I70.219 ATHEROSCLEROSIS OF NATIVE ARTERIES OF EXTREMITIES WITH INTERMITTENT CLAUDICATION, UNSPECIFIED EXTREMITY: ICD-10-CM

## 2024-04-22 PROCEDURE — 99349 HOME/RES VST EST MOD MDM 40: CPT

## 2024-04-22 NOTE — COUNSELING
[Normal Weight - ( BMI  <25 )] : normal weight - ( BMI  <25 ) [DASH diet recommended] : DASH diet recommended [Sodium restriction 2gm recommended] : sodium restriction 2 gm recommended [Continue diet as tolerated] : continue diet as tolerated based on goals of care [Non - Smoker] : non-smoker [Smoke/CO Detectors] : smoke/CO detectors [Use assistive device to avoid falls] : use assistive device to avoid falls [Remove clutter and unsafe carpeting to avoid falls] : remove clutter and unsafe carpeting to avoid falls [] : diabetic screening [Improve mobility] : improve mobility [Minimize unnecessary interventions] : minimize unnecessary interventions [Maintain functional ability] : maintain functional ability [Patient/Caregiver has ___ understanding of disease process] : patient/caregiver has [unfilled] understanding of disease process [Completed DNR] : completed DNR [Completed Medical Orders for Life-Sustaining Treatment] : completed medical orders for life-sustaining treatment [DNR] : Code Status: DNR [Limited] : Treatment Guidelines: Limited [DNI] : Intubation: DNI [Last [facility] Date: _____] [facility] Completion/last verification date: [unfilled] [_____] : HCP: [unfilled]

## 2024-04-22 NOTE — PHYSICAL EXAM
[No Acute Distress] : no acute distress [Well Nourished] : well nourished [Well Developed] : well developed [Normal Sclera/Conjunctiva] : normal sclera/conjunctiva [Normal Outer Ear/Nose] : the ears and nose were normal in appearance [Normal Oropharynx] : the oropharynx was normal [Normal TMs] : both tympanic membranes were normal [No JVD] : no jugular venous distention [Supple] : the neck was supple [No LAD] : no lymphadenopathy [No Respiratory Distress] : no respiratory distress [Clear to Auscultation] : lungs were clear to auscultation bilaterally [No Accessory Muscle Use] : no accessory muscle use [Normal Rate] : heart rate was normal  [Regular Rhythm] : with a regular rhythm [Normal S1, S2] : normal S1 and S2 [No Murmurs] : no murmurs heard [Breast Exam Declined] : patient declined to have breast exam done [Normal Bowel Sounds] : normal bowel sounds [Non Tender] : non-tender [Soft] : abdomen soft [Not Distended] : not distended [No Hernias] : no hernia was discovered [Patient Refused] : rectal exam was refused by the patient [No Joint Swelling] : no joint swelling seen [No Rash] : no rash [No Skin Lesions] : no skin lesions [Cranial Nerves Intact] : cranial nerves 2-12 were intact [No Motor Deficits] : the motor exam was normal [No Gross Sensory Deficits] : no gross sensory deficits [Oriented x3] : oriented to person, place, and time [Normal Affect] : the affect was normal [Normal Mood] : the mood was normal [Kyphosis] : no kyphosis present [Acne] : no acne [de-identified] : Pleasant  [de-identified] : Mild lower leg edema [de-identified] : ambulates with walker

## 2024-04-22 NOTE — CURRENT MEDS
[Medication and Allergies Reconciled] : medication and allergies reconciled [High Risk Medications Reviewed and Reconciled (Beers Criteria)] : high risk medications reviewed and reconciled [Adherent to medications] : Patient is adherent to medications as prescribed [de-identified] : medications managed by patient's daughter

## 2024-04-22 NOTE — HISTORY OF PRESENT ILLNESS
[Patient is stable] : patient is stable [Patient] : patient [Family Member] : family member [FreeTextEntry1] : gait instability [FreeTextEntry2] : 89 y/o female with pmh of HTN, CAD with stents, HLD, RA, depression, left femur fracture. Patient is being seen for a routine follow up visit.  Patient seen with HHA. patient seen alert/awake sitting in chair in no acute distress.  Interval Events - Pt treated for UTI with Cipro, no further c/o urinary symptoms - c/o ongoing arthritic pain of bilateral knees and arms - continue diclofenac gel, Tylenol as needed    Ambulation - ambulates with walker and one person assist.   Subjective Appetite: stable Constipation: sometimes 3 days apart. HHA gives her prunes daily.  Continent of urine and stool.  pain - denies pain at rest, has some arthritic pain to knees/wrists when she walks. encourage Tylenol.1000mg PO TID, voltaren gel.  Does PT exercises on own.  Mood/Behavior: A&O x 3, pleasant. Denies depression, however feels down at times as she is not as mobile. Misses her son that passed away a few years ago secondary to COVID.  Patient lives with grand-daughter and family and has aide to care for her during the day.

## 2024-04-22 NOTE — REVIEW OF SYSTEMS
[Lower Ext Edema] : lower extremity edema [Constipation] : constipation [Nocturia] : nocturia [Joint Pain] : joint pain [Joint Stiffness] : joint stiffness [Muscle Weakness] : muscle weakness [Depression] : depression [Pain] : no pain [Redness] : no redness [Dryness] : no dryness  [Itching] : no itching [Earache] : no earache [Hearing Loss] : no hearing loss [Nosebleed] : no nosebleeds [Hoarseness] : no hoarseness [Nasal Discharge] : no nasal discharge [Sore Throat] : no sore throat [Postnasal Drip] : no postnasal drip [Chest Pain] : no chest pain [Palpitations] : no palpitations [Leg Claudication] : no leg claudication [Orthopnea] : no orthopnea [Paroxysmal Nocturnal Dyspnea] : no paroxysmal nocturnal dyspnea [Shortness Of Breath] : no shortness of breath [Wheezing] : no wheezing [Cough] : no cough [Dyspnea on Exertion] : no dyspnea on exertion [Abdominal Pain] : no abdominal pain [Nausea] : no nausea [Diarrhea] : diarrhea [Vomiting] : no vomiting [Heartburn] : no heartburn [Dysuria] : no dysuria [Incontinence] : no incontinence [Hematuria] : no hematuria [Frequency] : no frequency [Vaginal Discharge] : no vaginal discharge [Joint Swelling] : no joint swelling [Muscle Pain] : no muscle pain [Back Pain] : no back pain [Negative] : Gastrointestinal [FreeTextEntry3] : wears glasses

## 2024-04-22 NOTE — CHRONIC CARE ASSESSMENT
[PPS Score: ____] : Palliative Performance Scale (PPS) Score: [unfilled] [Other: ____] : [unfilled] [Can not Exercise (Disability)] : Exercise: The patient can not exercise due to disability [None] : The patient does not exercise [FreeTextEntry1] : regular diet  [de-identified] : continue diet as tolerated

## 2024-05-03 ENCOUNTER — RX RENEWAL (OUTPATIENT)
Age: 89
End: 2024-05-03

## 2024-05-20 ENCOUNTER — APPOINTMENT (OUTPATIENT)
Dept: HOME HEALTH SERVICES | Facility: HOME HEALTH | Age: 89
End: 2024-05-20

## 2024-06-26 ENCOUNTER — APPOINTMENT (OUTPATIENT)
Dept: HOME HEALTH SERVICES | Facility: HOME HEALTH | Age: 89
End: 2024-06-26

## 2024-06-26 VITALS
HEART RATE: 72 BPM | OXYGEN SATURATION: 98 % | RESPIRATION RATE: 16 BRPM | SYSTOLIC BLOOD PRESSURE: 120 MMHG | TEMPERATURE: 97.4 F | DIASTOLIC BLOOD PRESSURE: 60 MMHG

## 2024-06-26 DIAGNOSIS — U07.1 COVID-19: ICD-10-CM

## 2024-06-26 DIAGNOSIS — M06.9 RHEUMATOID ARTHRITIS, UNSPECIFIED: ICD-10-CM

## 2024-06-26 DIAGNOSIS — R53.81 OTHER MALAISE: ICD-10-CM

## 2024-06-26 DIAGNOSIS — E78.5 HYPERLIPIDEMIA, UNSPECIFIED: ICD-10-CM

## 2024-06-26 PROCEDURE — 99349 HOME/RES VST EST MOD MDM 40: CPT

## 2024-07-15 ENCOUNTER — RX RENEWAL (OUTPATIENT)
Age: 89
End: 2024-07-15

## 2024-07-24 ENCOUNTER — APPOINTMENT (OUTPATIENT)
Dept: HOME HEALTH SERVICES | Facility: HOME HEALTH | Age: 89
End: 2024-07-24

## 2024-08-24 NOTE — REASON FOR VISIT
GI Consult Note                  Reason for Consultation: Acute GI bleeding/liver failure  Date of Consultation: 2024  Patient: Cherie Cortés  MRN: [id number]  :  1994  Attending/Requesting: Mami Mendoza MD          History of Present Illness:            This is a late entry.  I received a clinical summary of the case briefly yesterday and then again this morning    Patient transferred from an outside hospital for critical care management due to liver failure now with GI bleeding    I received a call earlier today from the ICU that she passed several melenic tarry stools.  There was some drop in blood pressure.    This is a 30-year-old  woman history of alcohol use disorder and bipolar disorder.  She presented to [facility] with 1 month of progressive yellowish discoloration.  Presumably she had \"stop drinking\" 2 weeks before this admission.  She reported abdominal discomfort was taking \"3 tablets of Tylenol at least 4 times a day for the past 2 days.    She is transferred here because of liver and renal failure.    Initial lab data showed hyponatremia of 127..   creatinine 7.09 total bili is 22.9 .  Lactic acid 5.8 patient was already started on N-acetylcysteine and empirically on ceftriaxone.  Volume repletion with IV fluids was given.  CT scan without contrast of the abdomen shows hepatic steatosis.  Nonspecific thickening of the right colon noted.    An attempt was first made to get a bed at a transplant center to evaluate for orthotopic liver transplantation but no beds are available.    History:  Past Medical History:   Diagnosis Date    Anxiety        Past Surgical History:   Procedure Laterality Date    Breast surgery         Allergies:   ALLERGIES:  Patient has no known allergies.    Home Meds:  No medications prior to admission.       Inpatient Meds:  Current  Facility-Administered Medications   Medication Dose Route Frequency Provider Last Rate Last Admin    dexMEDEtomidine (PRECEDEX) 400 mcg/100 mL in sodium chloride 0.9 % infusion  0-1.5 mcg/kg/hr (Dosing Weight) Intravenous Continuous Korina Alatorre MD 11.6 mL/hr at 08/24/24 1359 0.6 mcg/kg/hr at 08/24/24 1359    predniSONE (DELTASONE) tablet 40 mg  40 mg Per NG Tube Daily with breakfast Korina Alatorre MD   40 mg at 08/24/24 1513    lactulose (CHRONULAC) 10 GM/15ML solution 10 g  10 g Per NG Tube BID Issac Wilkes MD   10 g at 08/24/24 1513    rifAXIMin (XIFAXAN) tablet 200 mg  200 mg Per G Tube 2 times per day Issac Wilkes MD   200 mg at 08/24/24 1513    SODIUM CHLORIDE 0.9 % IV SOLN Pyxis Override             sodium chloride 0.9% infusion   Intravenous Continuous PRN Issac Wilkes MD        sodium chloride 0.9% infusion   Intravenous Continuous PRN Issac Wilkes MD        NORepinephrine (LEVOPHED) 8 mg/250 mL in dextrose 5 % infusion  0-100 mcg/min Intravenous Continuous Issac Wilkes MD 26.25 mL/hr at 08/24/24 1359 14 mcg/min at 08/24/24 1359    thiamine (VITAMIN B-1) 500 mg in sodium chloride 0.9 % 100 mL IVPB  500 mg Intravenous TID Mami Mendoza MD        Followed by    [START ON 8/31/2024] thiamine (VITAMIN B-1) 250 mg in sodium chloride 0.9 % 100 mL IVPB  250 mg Intravenous Daily Mami Mendoza MD        Followed by    [START ON 9/5/2024] thiamine (VITAMIN B1) tablet 100 mg  100 mg Per NG Tube TID Mami Mendoza MD        Followed by    [START ON 9/19/2024] thiamine (VITAMIN B1) tablet 100 mg  100 mg Per NG Tube Daily Mami Mendoza MD        sodium chloride 0.9 % injection 10 mL  10 mL Intracatheter PRN Abner Aldana MD        sodium chloride (NORMAL SALINE) 0.9 % bolus 1,000 mL  1,000 mL CRRT PRN Abner Aldana MD        Standard Replacement Fluid with Calcium, Potassium Chloride 4 mEq/L (PRISMASOL BGK 4/2.5)   CRRT Continuous Abner Aldana MD        sodium chloride 3  % infusion  25 mL/hr Intravenous Continuous [facility]        sodium chloride 0.9 % flush bag 25 mL  25 mL Intravenous PRN Issac Gross,         sodium chloride 0.9 % injection 2 mL  2 mL Intracatheter 2 times per day Issac Gross, DO   2 mL at 08/24/24 0942    sodium chloride 0.9% infusion   Intravenous Continuous PRN Ginny Issac, DO        sodium chloride 0.9% infusion   Intravenous Continuous PRN Fernanda Grossil, DO        phytonadione (vitamin K1) (AQUA-MEPHYTON) 10 mg in sodium chloride 0.9 % 50 mL IVPB  10 mg Intravenous Nightly GrossIssac gamboa, DO   Completed at 08/23/24 2322    pantoprazole (PROTONIX INJECT) injection 40 mg  40 mg Intravenous 2 times per day Issac Gross, DO   40 mg at 08/24/24 0941    vasopressin (VASOSTRICT) 20 unit/100 mL dextrose 5 % infusion  0.04 Units/min Intravenous Continuous Issac Gross DO 12 mL/hr at 08/24/24 1359 0.04 Units/min at 08/24/24 1359    potassium phosphate 20 mmol in sodium chloride 0.9 % 250 mL IVPB  20 mmol Intravenous Q12H PRN Abner Aldana MD        Or    sodium PHOSPHATE 20 mmol in sodium chloride 0.9 % 250 mL IVPB  20 mmol Intravenous Q12H PRN Abner Aldana MD        magnesium sulfate 2 g in 50 mL premix IVPB  2 g Intravenous Q12H PRAbner Shahid MD        potassium CHLORIDE 20 MEQ/50ML IVPB premix 20 mEq  20 mEq Intravenous PRN Abner Aldana MD        potassium CHLORIDE 20 MEQ/50ML IVPB premix 20 mEq  20 mEq Intravenous PRN Abner Aldana MD        sodium bicarbonate 8.4 % injection 50 mEq  50 mEq Intravenous PRAbner Shahid MD        sodium chloride 0.9 % injection 10 mL  10 mL Intracatheter PRAbner Shahid MD        sodium chloride (NORMAL SALINE) 0.9 % bolus 1,000 mL  1,000 mL CRRT PRAbner Shahid MD        calcium chloride 1 g in dextrose 5 % 50 mL total volume IVPB  1 g Intravenous PRN Abner Aldana MD        folic acid (FOLATE) injection 1 mg  1 mg Intravenous Daily Issac Gross,  DO   1 mg at 08/24/24 0942    fentaNYL (SUBLIMAZE) 2,500 mcg/250 mL in sodium chloride 0.9 % infusion  0-150 mcg/hr Intravenous Continuous Issac Gross, DO 2.5 mL/hr at 08/24/24 1359 25 mcg/hr at 08/24/24 1359    And    fentaNYL bolus from bag  mcg   mcg Intravenous Q15 Min PRN Issac Gross, DO   100 mcg at 08/24/24 1339    docusate sodium-sennosides (SENOKOT S) 50-8.6 MG 2 tablet  2 tablet Per NG Tube QHS Issac Gross, DO        polyethylene glycol (MIRALAX) packet 17 g  17 g Per NG Tube Daily PRN Issac Gross, DO        Or    bisacodyl (DULCOLAX) suppository 10 mg  10 mg Rectal Daily PRN Issac Gross, DO        CARBOXYMethylcellulose (REFRESH TEARS) 0.5 % ophthalmic solution 1 drop  1 drop Both Eyes 6 times per day Issac Gross DO   1 drop at 08/24/24 1514    CARBOXYMethylcellulose (REFRESH TEARS) 0.5 % ophthalmic solution 1 drop  1 drop Both Eyes PRN Issac Gross, DO        octreotide (SandoSTATIN) 1,000 mcg/100 mL in sodium chloride 0.9 % infusion  50 mcg/hr Intravenous Continuous Issac Gross DO 5 mL/hr at 08/24/24 1359 50 mcg/hr at 08/24/24 1359    chlorhexidine gluconate (PERIDEX) 0.12 % solution 15 mL  15 mL Swish & Spit 2 times per day Issac Gross DO   15 mL at 08/24/24 0941    And    chlorhexidine gluconate (PERIDEX) 0.12 % solution 15 mL  15 mL Swish & Spit PRN Issac Gross, DO        EPINEPHrine syringe 10 mcg  10 mcg Intravenous Once Mami Mendoza MD        piperacillin-tazobactam (ZOSYN) 3.375 g in sodium chloride 0.9 % 100 mL IVPB  3.375 g Intravenous 3 times per day Issac Gross, DO 25 mL/hr at 08/24/24 1359 Rate Verify at 08/24/24 1359    sodium chloride 0.9% infusion   Intravenous Continuous PRN Issac Gross,         sodium chloride 0.9% infusion   Intravenous Continuous PRN Issac Gross,         acetylcysteine (ACETADOTE) 19,300 mg in dextrose 5 % 1,000 mL IVPB  250 mg/kg Intravenous Once Mami Mendoza MD 50 mL/hr at 08/24/24 8421  Rate Verify at 08/24/24 1359       Immunizations & Other History:  Immunization History   Administered Date(s) Administered    COVID Moderna 0.5 mL 12Y+ 02/02/2022, 03/02/2022       Social History     Socioeconomic History    Marital status: Single     Spouse name: Not on file    Number of children: Not on file    Years of education: Not on file    Highest education level: Not on file   Occupational History    Not on file   Tobacco Use    Smoking status: Every Day    Smokeless tobacco: Not on file   Substance and Sexual Activity    Alcohol use: Yes    Drug use: Never    Sexual activity: Not on file   Other Topics Concern    Not on file   Social History Narrative    Not on file     Social Determinants of Health     Financial Resource Strain: Low Risk  (8/24/2024)    Financial Resource Strain     Unable to Get: None   Food Insecurity: Low Risk  (8/24/2024)    Food Insecurity     Worried about Food: Never true     Food is Gone: Never true   Transportation Needs: Not At Risk (8/24/2024)    Transportation Needs     Lack of Reliable Transportation: No   Physical Activity: Not on file   Stress: Not on file   Social Connections: Low Risk  (8/24/2024)    Social Connections     Social Connectivity: 3 to 5 times a week   Interpersonal Safety: Low Risk  (8/24/2024)    Interpersonal Safety     How often physically hurt: Never     How often insulted or talked down to: Never     How often threatened with harm: Never     How often scream or curse at: Never       No family history on file.    Review of Systems  Review of Systems   A direct review of systems was not obtainable.  See history of present illness.  All other systems reviewed in the chart were noted     Objective:            Vitals Ranges and Last Value:  Temp:  [93.9 °F (34.4 °C)-99 °F (37.2 °C)] 99 °F (37.2 °C)  Heart Rate:  [100-125] 102  Resp:  [0-40] 18  BP: ()/(45-91) 164/91  Arterial Line BP: ()/() 111/47  FiO2 (%):  [60 %-100 %] 60  %    Intake/Output last 3 shifts:  I/O last 3 completed shifts:  In: 5353.4 [I.V.:2353.4; Blood:1088; IV Piggyback:1912]  Out: 2298 [Urine:140; Other:2158]    Intake/Output this shift:  No intake/output data recorded.     Exam:  Physical Exam:     Intubated and sedated    Icteric  There are no clear stigmata of chronic liver disease visible.  Lungs with rhonchi    Abdomen is distended  LABS:    CBC:  Recent Labs   Lab 08/24/24 1036 08/24/24 0422 08/23/24 2138   WBC 58.4* 59.7* 59.1*   HCT 18.0* 20.7* 18.2*   HGB 6.5* 7.3* 6.7*   * 131* 180       CMP:  Recent Labs   Lab 08/24/24  1036 08/24/24 0422 08/23/24 2138 08/23/24  1509   SODIUM 132* 132* 134*  --    POTASSIUM 3.3* 3.1* 3.6  --    CHLORIDE 96* 94* 93*  --    CO2 21 19* 14*  --    GLUCOSE 161* 194* 138*  --    BUN 58* 79* 100*  --    CREATININE 3.81* 4.96* 6.30*  --    CALCIUM 7.8* 7.6* 6.3*  --    TOTPROTEIN  --  6.2* 5.3* 5.6*   ALBUMIN  --  2.5* 1.4* 1.0*   BILIRUBIN  --  22.0* 21.2* 21.0*   AST  --  133* 129* 114*   GPT  --  21 16 6   ALKPT  --  250* 171* 230*       Coags:  Recent Labs   Lab 08/24/24  0435 08/23/24 2138 08/23/24  1044   INR 4.5 7.3* 4.1   PTT 60* 67* 76*       IMAGING:    XR NASOGASTRIC TUBE CHECK ABDOMEN   Final Result      Nasogastric tube in satisfactory position               Electronically Signed by: JOSE ROJAS M.D    Signed on: 8/24/2024 2:43 PM    Workstation ID: [id number]      XR CHEST AP OR PA   Final Result      Left central line in good position         Electronically signed by Diane Richter MD on 08 24 24 at 03:47      CT HEAD WO CONTRAST   Final Result      No acute intracranial hemorrhage, acute transcortical infarct, or mass   effect. No focal or diffuse cerebral edema identified.               Electronically Signed by: ASHLEY ANGLIN M.D.    Signed on: 8/24/2024 12:32 AM    Workstation ID: [id number]      XR CHEST AP OR PA   Final Result   Impression:      Endotracheal tube tip projects at the  clinton. Consider retracting   approximately 2 to 3 cm.      Mild bibasilar atelectasis.      Findings were discussed with patient's nurse, Aby Moreira by telephone at   11:31 p.m. on 8/23/2024.      Electronically Signed by: TAWANA HERRERA M.D.    Signed on: 8/23/2024 11:31 PM    Workstation ID: [id number]              Impression and Recommendations:            Acute upper gastrointestinal hemorrhage  Acute over chronic alcoholic liver disease  Severe intrahepatic cholestasis  Liver failure  Alcohol use disorder.  Drinking up until 2 weeks ago  Bipolar disorder    Patient obviously is critically ill we will address her acute upper GI bleed with an urgent upper endoscopy prior to making further recommendations    The overall outlook/prognosis unfortunately is guarded to poor given the presence of significant liver failure and acute over chronic kidney failure    Avani Lambert MD, FACP.  Attending Physician Gastroenterology  8/24/2024  3:19 PM    This note may have been transcribed with voice recognition software/M*Modal Fluency dictation system in part or in whole.  There may be voice recognition errors which should not be taken to alter content or meaning.   [Follow-Up] : a follow-up visit [Formal Caregiver] : formal caregiver [Pre-Visit Preparation] : pre-visit preparation was done [Intercurrent Specialty/Sub-specialty Visits] : the patient has no intercurrent specialty/sub-specialty visits [FreeTextEntry2] : chart review

## 2024-09-14 ENCOUNTER — LABORATORY RESULT (OUTPATIENT)
Age: 89
End: 2024-09-14

## 2024-09-14 ENCOUNTER — APPOINTMENT (OUTPATIENT)
Dept: AFTER HOURS CARE | Facility: EMERGENCY ROOM | Age: 89
End: 2024-09-14

## 2024-09-14 ENCOUNTER — APPOINTMENT (OUTPATIENT)
Dept: HOME HEALTH SERVICES | Facility: HOME HEALTH | Age: 89
End: 2024-09-14
Payer: MEDICARE

## 2024-09-14 ENCOUNTER — NON-APPOINTMENT (OUTPATIENT)
Age: 89
End: 2024-09-14

## 2024-09-14 ENCOUNTER — TRANSCRIPTION ENCOUNTER (OUTPATIENT)
Age: 89
End: 2024-09-14

## 2024-09-14 DIAGNOSIS — J18.9 PNEUMONIA, UNSPECIFIED ORGANISM: ICD-10-CM

## 2024-09-14 PROCEDURE — 99348 HOME/RES VST EST LOW MDM 30: CPT

## 2024-09-15 PROBLEM — E87.1 HYPONATREMIA: Status: ACTIVE | Noted: 2024-09-15

## 2024-09-16 PROBLEM — J18.9 COMMUNITY ACQUIRED PNEUMONIA, UNSPECIFIED LATERALITY: Status: ACTIVE | Noted: 2024-09-16

## 2024-09-18 ENCOUNTER — APPOINTMENT (OUTPATIENT)
Dept: HOME HEALTH SERVICES | Facility: HOME HEALTH | Age: 89
End: 2024-09-18
Payer: MEDICARE

## 2024-09-18 VITALS
DIASTOLIC BLOOD PRESSURE: 60 MMHG | HEART RATE: 74 BPM | RESPIRATION RATE: 18 BRPM | TEMPERATURE: 97.6 F | SYSTOLIC BLOOD PRESSURE: 120 MMHG | OXYGEN SATURATION: 97 %

## 2024-09-18 DIAGNOSIS — R09.02 HYPOXEMIA: ICD-10-CM

## 2024-09-18 DIAGNOSIS — I10 ESSENTIAL (PRIMARY) HYPERTENSION: ICD-10-CM

## 2024-09-18 DIAGNOSIS — M06.9 RHEUMATOID ARTHRITIS, UNSPECIFIED: ICD-10-CM

## 2024-09-18 PROCEDURE — 99349 HOME/RES VST EST MOD MDM 40: CPT

## 2024-09-18 NOTE — HISTORY OF PRESENT ILLNESS
[Patient is stable] : patient is stable [Patient] : patient [Family Member] : family member [FreeTextEntry1] : gait instability [FreeTextEntry2] : 89 y/o female with pmh of HTN, CAD with stents, HLD, RA, depression, left femur fracture. Patient is being seen for a routine follow up visit.  Patient seen with HHA. patient seen alert/awake sitting in chair in no acute distress.  Interval Events - Does not want flu vaccine at this time - Reviewed Chest Xray that shows no pneumonia or CHF - Pt on azithromycin for URI - CBC at baseline - BMP with low Na 133 - repeat BMP ordered for 9/23/2024  - c/o ongoing arthritic pain of bilateral knees and arms - continue diclofenac gel, Tylenol as needed    Ambulation - ambulates with walker and one person assist.   Subjective Appetite: stable Constipation: sometimes 3 days apart. HHA gives her prunes daily.  Continent of urine and stool.  pain - denies pain at rest, has some arthritic pain to knees/wrists when she walks. encourage Tylenol.1000mg PO TID, voltaren gel.  Does PT exercises on own.  Mood/Behavior: A&O x 3, pleasant. Denies depression, however feels down at times as she is not as mobile. Misses her son that passed away a few years ago secondary to COVID.  Patient lives with grand-daughter and family and has aide to care for her during the day.

## 2024-09-18 NOTE — PHYSICAL EXAM
[No Acute Distress] : no acute distress [Well Nourished] : well nourished [Well Developed] : well developed [Normal Sclera/Conjunctiva] : normal sclera/conjunctiva [Normal Outer Ear/Nose] : the ears and nose were normal in appearance [Normal Oropharynx] : the oropharynx was normal [Normal TMs] : both tympanic membranes were normal [No JVD] : no jugular venous distention [Supple] : the neck was supple [No LAD] : no lymphadenopathy [No Respiratory Distress] : no respiratory distress [Clear to Auscultation] : lungs were clear to auscultation bilaterally [No Accessory Muscle Use] : no accessory muscle use [Normal Rate] : heart rate was normal  [Regular Rhythm] : with a regular rhythm [Normal S1, S2] : normal S1 and S2 [No Murmurs] : no murmurs heard [Breast Exam Declined] : patient declined to have breast exam done [Normal Bowel Sounds] : normal bowel sounds [Non Tender] : non-tender [Soft] : abdomen soft [Not Distended] : not distended [No Hernias] : no hernia was discovered [Patient Refused] : rectal exam was refused by the patient [No Joint Swelling] : no joint swelling seen [No Rash] : no rash [No Skin Lesions] : no skin lesions [Cranial Nerves Intact] : cranial nerves 2-12 were intact [No Motor Deficits] : the motor exam was normal [No Gross Sensory Deficits] : no gross sensory deficits [Oriented x3] : oriented to person, place, and time [Normal Affect] : the affect was normal [Normal Mood] : the mood was normal [Kyphosis] : no kyphosis present [Acne] : no acne [de-identified] : Pleasant  [de-identified] : Mild lower leg edema [de-identified] : ambulates with walker

## 2024-09-18 NOTE — CHRONIC CARE ASSESSMENT
[PPS Score: ____] : Palliative Performance Scale (PPS) Score: [unfilled] [Other: ____] : [unfilled] [Can not Exercise (Disability)] : Exercise: The patient can not exercise due to disability [None] : The patient does not exercise [FreeTextEntry1] : regular diet  [de-identified] : continue diet as tolerated

## 2024-09-18 NOTE — CURRENT MEDS
[Medication and Allergies Reconciled] : medication and allergies reconciled [High Risk Medications Reviewed and Reconciled (Beers Criteria)] : high risk medications reviewed and reconciled [Adherent to medications] : Patient is adherent to medications as prescribed [de-identified] : medications managed by patient's daughter

## 2024-09-18 NOTE — COUNSELING
[Normal Weight - ( BMI  <25 )] : normal weight - ( BMI  <25 ) [DASH diet recommended] : DASH diet recommended [Sodium restriction 2gm recommended] : sodium restriction 2 gm recommended [Continue diet as tolerated] : continue diet as tolerated based on goals of care [Non - Smoker] : non-smoker [Smoke/CO Detectors] : smoke/CO detectors [Use assistive device to avoid falls] : use assistive device to avoid falls [Remove clutter and unsafe carpeting to avoid falls] : remove clutter and unsafe carpeting to avoid falls [] : diabetic screening [Improve mobility] : improve mobility [Minimize unnecessary interventions] : minimize unnecessary interventions [Maintain functional ability] : maintain functional ability [Patient/Caregiver has ___ understanding of disease process] : patient/caregiver has [unfilled] understanding of disease process [Completed DNR] : completed DNR [Completed Medical Orders for Life-Sustaining Treatment] : completed medical orders for life-sustaining treatment [DNR] : Code Status: DNR [Limited] : Treatment Guidelines: Limited [DNI] : Intubation: DNI [[facility] Ver[facility] Date: _____] [facility]: [unfilled] [_____] : HCP: [unfilled]

## 2024-09-23 ENCOUNTER — TRANSCRIPTION ENCOUNTER (OUTPATIENT)
Age: 89
End: 2024-09-23

## 2024-09-23 ENCOUNTER — RX RENEWAL (OUTPATIENT)
Age: 89
End: 2024-09-23

## 2024-09-23 ENCOUNTER — LABORATORY RESULT (OUTPATIENT)
Age: 89
End: 2024-09-23

## 2024-09-24 ENCOUNTER — TRANSCRIPTION ENCOUNTER (OUTPATIENT)
Age: 89
End: 2024-09-24

## 2024-09-24 ENCOUNTER — EMERGENCY (EMERGENCY)
Facility: HOSPITAL | Age: 89
LOS: 1 days | Discharge: HOME | End: 2024-09-24
Attending: EMERGENCY MEDICINE
Payer: MEDICARE

## 2024-09-24 ENCOUNTER — APPOINTMENT (OUTPATIENT)
Dept: HOME HEALTH SERVICES | Facility: HOME HEALTH | Age: 89
End: 2024-09-24
Payer: MEDICARE

## 2024-09-24 VITALS
RESPIRATION RATE: 16 BRPM | DIASTOLIC BLOOD PRESSURE: 76 MMHG | WEIGHT: 139.99 LBS | TEMPERATURE: 98 F | SYSTOLIC BLOOD PRESSURE: 125 MMHG | HEART RATE: 64 BPM | HEIGHT: 64 IN | OXYGEN SATURATION: 98 %

## 2024-09-24 VITALS
TEMPERATURE: 98 F | OXYGEN SATURATION: 98 % | DIASTOLIC BLOOD PRESSURE: 78 MMHG | SYSTOLIC BLOOD PRESSURE: 118 MMHG | RESPIRATION RATE: 15 BRPM | HEART RATE: 68 BPM

## 2024-09-24 DIAGNOSIS — S01.319A LACERATION W/OUT FOREIGN BODY OF UNSPECIFIED EAR, INITIAL ENCOUNTER: ICD-10-CM

## 2024-09-24 PROCEDURE — 71045 X-RAY EXAM CHEST 1 VIEW: CPT

## 2024-09-24 PROCEDURE — 70450 CT HEAD/BRAIN W/O DYE: CPT | Mod: MC

## 2024-09-24 PROCEDURE — 99284 EMERGENCY DEPT VISIT MOD MDM: CPT | Mod: 25

## 2024-09-24 PROCEDURE — 99285 EMERGENCY DEPT VISIT HI MDM: CPT | Mod: FS

## 2024-09-24 PROCEDURE — 73070 X-RAY EXAM OF ELBOW: CPT | Mod: 26,RT

## 2024-09-24 PROCEDURE — 90715 TDAP VACCINE 7 YRS/> IM: CPT

## 2024-09-24 PROCEDURE — 73030 X-RAY EXAM OF SHOULDER: CPT | Mod: 26,RT

## 2024-09-24 PROCEDURE — 72170 X-RAY EXAM OF PELVIS: CPT | Mod: 26

## 2024-09-24 PROCEDURE — 90471 IMMUNIZATION ADMIN: CPT

## 2024-09-24 PROCEDURE — 99348 HOME/RES VST EST LOW MDM 30: CPT

## 2024-09-24 PROCEDURE — 73030 X-RAY EXAM OF SHOULDER: CPT

## 2024-09-24 PROCEDURE — 72125 CT NECK SPINE W/O DYE: CPT | Mod: 26,MC

## 2024-09-24 PROCEDURE — 73020 X-RAY EXAM OF SHOULDER: CPT

## 2024-09-24 PROCEDURE — 71045 X-RAY EXAM CHEST 1 VIEW: CPT | Mod: 26

## 2024-09-24 PROCEDURE — 72125 CT NECK SPINE W/O DYE: CPT | Mod: MC

## 2024-09-24 PROCEDURE — 73070 X-RAY EXAM OF ELBOW: CPT

## 2024-09-24 PROCEDURE — 73060 X-RAY EXAM OF HUMERUS: CPT | Mod: 26,RT

## 2024-09-24 PROCEDURE — 12004 RPR S/N/AX/GEN/TRK7.6-12.5CM: CPT

## 2024-09-24 PROCEDURE — 70450 CT HEAD/BRAIN W/O DYE: CPT | Mod: 26,MC

## 2024-09-24 PROCEDURE — 73060 X-RAY EXAM OF HUMERUS: CPT

## 2024-09-24 PROCEDURE — 72170 X-RAY EXAM OF PELVIS: CPT

## 2024-09-24 RX ORDER — TETANUS TOXOID, REDUCED DIPHTHERIA TOXOID AND ACELLULAR PERTUSSIS VACCINE, ADSORBED 5; 2.5; 8; 8; 2.5 [IU]/.5ML; [IU]/.5ML; UG/.5ML; UG/.5ML; UG/.5ML
0.5 SUSPENSION INTRAMUSCULAR ONCE
Refills: 0 | Status: COMPLETED | OUTPATIENT
Start: 2024-09-24 | End: 2024-09-24

## 2024-09-24 RX ORDER — ACETAMINOPHEN 325 MG/1
975 TABLET ORAL ONCE
Refills: 0 | Status: COMPLETED | OUTPATIENT
Start: 2024-09-24 | End: 2024-09-24

## 2024-09-24 RX ORDER — LIDOCAINE HCL/EPINEPHRINE 2%-1:50000
10 SYRINGE (ML) INJECTION ONCE
Refills: 0 | Status: COMPLETED | OUTPATIENT
Start: 2024-09-24 | End: 2024-09-24

## 2024-09-24 RX ADMIN — ACETAMINOPHEN 975 MILLIGRAM(S): 325 TABLET ORAL at 19:40

## 2024-09-24 RX ADMIN — Medication 10 MILLILITER(S): at 19:06

## 2024-09-24 RX ADMIN — TETANUS TOXOID, REDUCED DIPHTHERIA TOXOID AND ACELLULAR PERTUSSIS VACCINE, ADSORBED 0.5 MILLILITER(S): 5; 2.5; 8; 8; 2.5 SUSPENSION INTRAMUSCULAR at 19:36

## 2024-09-24 NOTE — ED CLERICAL - NS ED CLERK NOTE PRE-ARRIVAL INFORMATION; ADDITIONAL PRE-ARRIVAL INFORMATION

## 2024-09-24 NOTE — ED PROVIDER NOTE - PATIENT PORTAL LINK FT
You can access the FollowMyHealth Patient Portal offered by [facility] by registering at the following website: [email or web address]. By joining [employer]’s FollowMyHealth portal, you will also be able to view your health information using other applications (apps) compatible with our system.

## 2024-09-24 NOTE — ED PROVIDER NOTE - PROGRESS NOTE DETAILS
plastics consulted for ear lac. Khadra Meléndez PA-C Plastic surgery repaired lac. Imaging neg for acute findings. Discussed results with pt and family at bedside. Stable for dc home. Khadra Meléndez PA-C

## 2024-09-24 NOTE — ED PROVIDER NOTE - NSFOLLOWUPINSTRUCTIONS_ED_ALL_ED_FT
1. Follow up with pcp within 2-3 days.   2. Rest. Hydrate.   3. Take  Acetaminophen (Tylenol) 650mg every 6 hours for pain as needed.  4. Keep wound clean and dry.   5. Return to the emergency department if you have worsening pain, bleeding, dizziness, fainting or all other concerning symptoms.

## 2024-09-24 NOTE — PROGRESS NOTE ADULT - SUBJECTIVE AND OBJECTIVE BOX
Marcus Noyola MD FACS  Plastic & Reconstructive Surgery  [address] [place] [address]    [phone]    Patient Info:ORACIO [id number]  [facility] ED  The patient is a  89y  Females/p mechanical fall   with open wound right ear and over mastoid    Asked to evaluate by ER    T(C): 36.7 (09-24-24 @ 17:17), Max: 36.7 (09-24-24 @ 17:17)  HR: 64 (09-24-24 @ 17:17) (64 - 64)  BP: 125/76 (09-24-24 @ 17:17) (125/76 - 125/76)  RR: 16 (09-24-24 @ 17:17) (16 - 16)  SpO2: 98% (09-24-24 @ 17:17) (98% - 98%)    RBA of repair reviewed  Tolerated repair  OK to wash would with soap and water  Aquaphor to suture line    Call office for follow up

## 2024-09-24 NOTE — ED ADULT TRIAGE NOTE - ADDITIONAL SAFETY/BANDS...
05/20/22 Contacted Dr. Auguste's office as several fax attempts failed.  Said their fax was down and they would call when back up.  Mailed report to office 05/25/22.  [facility] [place]/Bands:

## 2024-09-24 NOTE — ED PROVIDER NOTE - OBJECTIVE STATEMENT
89 y/o female with CAD with stents on ASA/plavix, htn, arthritis presents to the ED s/p mechanical trip and fall. Daughter states that she got up by herself without her walker which she is not supposed to do. She subsequently fell to the right side and hit her head. Did not pass out. She was able to get up with assistance. Complains of pain to right shoulder. Has laceration to right ear. No other pain or injury. Feeling well lately. No fevers, chills, chest pain, cough, n/v/d.

## 2024-09-24 NOTE — ED PROVIDER NOTE - ATTENDING APP SHARED VISIT CONTRIBUTION OF CARE
Patient is a 89-year-old female presenting by ambulance with her family with history of CAD with stents on Plavix aspirin as well as high blood pressure presenting status post fall from standing onto her right side complaining of right shoulder pain decreased range of motion of the right shoulder rule out proximal humerus fracture denies hitting head but has a large of ear laceration through and through middle ear no active bleeding imaging ordered of the right arm as well as CT head and C-spine plastic surgery for complex laceration repair

## 2024-09-24 NOTE — ED ADULT NURSE NOTE - NS ED PATIENT SAFETY CONCERN
The above form was filled out today 11/01/23; awaiting review,signature and date from   Dr. Merissa Navarro.
No

## 2024-09-24 NOTE — ED PROVIDER NOTE - PHYSICAL EXAMINATION
CONSTITUTIONAL: Patient is awake, alert and oriented x 3.   HEAD: NCAT  EYES: PERRL bilaterally,   ENT: Airway patent, (+) through and through mid ear laceration to right ear helix and anti-helix   NECK: Supple,   LUNGS: CTA B/L  HEART: RRR.+S1S2   ABDOMEN: Soft, non-tender to palpation throughout all four quadrants  MSK: ttp to right shoulder, unable to flex/extension right shoulder,   SKIN: No rash or lesions  NEURO: No focal deficits, Sensation intact;

## 2024-09-24 NOTE — ED ADULT NURSE NOTE - OBJECTIVE STATEMENT
Received a 89F bibems from home with h/o CAD with stents on ASA/plavix, htn, arthritis presents to the ED s/p mechanical trip and fall. Daughter states that she got up by herself without her walker which she is not supposed to do. She subsequently fell to the right side and hit her head. Did not pass out. She was able to get up with assistance. Complains of pain to right shoulder. Has laceration to right ear. Patient seen and evaluated by MD, sent to CT scan and xray, pending plastic surgeon for lac repair.

## 2024-09-26 ENCOUNTER — APPOINTMENT (OUTPATIENT)
Dept: HOME HEALTH SERVICES | Facility: HOME HEALTH | Age: 89
End: 2024-09-26

## 2024-09-27 ENCOUNTER — LABORATORY RESULT (OUTPATIENT)
Age: 89
End: 2024-09-27

## 2024-09-27 ENCOUNTER — TRANSCRIPTION ENCOUNTER (OUTPATIENT)
Age: 89
End: 2024-09-27

## 2024-09-27 ENCOUNTER — NON-APPOINTMENT (OUTPATIENT)
Age: 89
End: 2024-09-27

## 2024-09-27 DIAGNOSIS — Z79.899 OTHER LONG TERM (CURRENT) DRUG THERAPY: ICD-10-CM

## 2024-10-01 ENCOUNTER — NON-APPOINTMENT (OUTPATIENT)
Age: 89
End: 2024-10-01

## 2024-10-01 DIAGNOSIS — D64.9 ANEMIA, UNSPECIFIED: ICD-10-CM

## 2024-10-01 RX ORDER — SENNOSIDES 8.6 MG TABLETS 8.6 MG/1
8.6 TABLET ORAL
Qty: 30 | Refills: 0 | Status: ACTIVE | COMMUNITY
Start: 2024-10-01 | End: 1900-01-01

## 2024-10-03 RX ORDER — LIDOCAINE 5 G/100G
5 OINTMENT TOPICAL
Qty: 1 | Refills: 0 | Status: ACTIVE | COMMUNITY
Start: 2024-10-03 | End: 1900-01-01

## 2024-10-03 NOTE — PHYSICAL THERAPY INITIAL EVALUATION ADULT - WEIGHT-BEARING RESTRICTIONS: STAND/SIT, REHAB EVAL
Patient was sent e advice informing of the below message per Corbin Perez MD. Alert activated for 1 wk if not seen by patient      LLE/nonweight-bearing

## 2024-10-11 ENCOUNTER — APPOINTMENT (OUTPATIENT)
Dept: HOME HEALTH SERVICES | Facility: HOME HEALTH | Age: 89
End: 2024-10-11
Payer: MEDICARE

## 2024-10-11 VITALS
DIASTOLIC BLOOD PRESSURE: 70 MMHG | RESPIRATION RATE: 16 BRPM | SYSTOLIC BLOOD PRESSURE: 120 MMHG | OXYGEN SATURATION: 98 % | HEART RATE: 68 BPM | TEMPERATURE: 97.8 F

## 2024-10-11 DIAGNOSIS — M06.9 RHEUMATOID ARTHRITIS, UNSPECIFIED: ICD-10-CM

## 2024-10-11 DIAGNOSIS — I10 ESSENTIAL (PRIMARY) HYPERTENSION: ICD-10-CM

## 2024-10-11 PROCEDURE — 99349 HOME/RES VST EST MOD MDM 40: CPT

## 2024-10-12 ENCOUNTER — TRANSCRIPTION ENCOUNTER (OUTPATIENT)
Age: 89
End: 2024-10-12

## 2024-10-12 ENCOUNTER — NON-APPOINTMENT (OUTPATIENT)
Age: 89
End: 2024-10-12

## 2024-10-12 DIAGNOSIS — Z20.822 CONTACT WITH AND (SUSPECTED) EXPOSURE TO COVID-19: ICD-10-CM

## 2024-10-14 ENCOUNTER — LABORATORY RESULT (OUTPATIENT)
Age: 89
End: 2024-10-14

## 2024-10-15 ENCOUNTER — TRANSCRIPTION ENCOUNTER (OUTPATIENT)
Age: 89
End: 2024-10-15

## 2024-10-15 ENCOUNTER — NON-APPOINTMENT (OUTPATIENT)
Age: 89
End: 2024-10-15

## 2024-10-16 ENCOUNTER — TRANSCRIPTION ENCOUNTER (OUTPATIENT)
Age: 89
End: 2024-10-16

## 2024-10-16 ENCOUNTER — NON-APPOINTMENT (OUTPATIENT)
Age: 89
End: 2024-10-16

## 2024-10-17 ENCOUNTER — NON-APPOINTMENT (OUTPATIENT)
Age: 89
End: 2024-10-17

## 2024-10-24 ENCOUNTER — LABORATORY RESULT (OUTPATIENT)
Age: 89
End: 2024-10-24

## 2024-10-25 ENCOUNTER — LABORATORY RESULT (OUTPATIENT)
Age: 89
End: 2024-10-25

## 2024-10-28 ENCOUNTER — LABORATORY RESULT (OUTPATIENT)
Age: 89
End: 2024-10-28

## 2024-10-30 ENCOUNTER — LABORATORY RESULT (OUTPATIENT)
Age: 89
End: 2024-10-30

## 2024-11-01 ENCOUNTER — RX RENEWAL (OUTPATIENT)
Age: 89
End: 2024-11-01

## 2024-11-14 ENCOUNTER — APPOINTMENT (OUTPATIENT)
Dept: HOME HEALTH SERVICES | Facility: HOME HEALTH | Age: 89
End: 2024-11-14

## 2024-11-14 ENCOUNTER — NON-APPOINTMENT (OUTPATIENT)
Age: 89
End: 2024-11-14

## 2024-11-15 ENCOUNTER — NON-APPOINTMENT (OUTPATIENT)
Age: 89
End: 2024-11-15

## 2024-11-22 ENCOUNTER — MED ADMIN CHARGE (OUTPATIENT)
Age: 89
End: 2024-11-22

## 2024-11-22 ENCOUNTER — APPOINTMENT (OUTPATIENT)
Dept: HOME HEALTH SERVICES | Facility: HOME HEALTH | Age: 89
End: 2024-11-22

## 2024-11-22 VITALS
SYSTOLIC BLOOD PRESSURE: 112 MMHG | RESPIRATION RATE: 18 BRPM | OXYGEN SATURATION: 98 % | TEMPERATURE: 97.5 F | DIASTOLIC BLOOD PRESSURE: 81 MMHG | HEART RATE: 70 BPM

## 2024-12-06 ENCOUNTER — APPOINTMENT (OUTPATIENT)
Dept: HOME HEALTH SERVICES | Facility: HOME HEALTH | Age: 88
End: 2024-12-06
Payer: MEDICARE

## 2024-12-06 VITALS
RESPIRATION RATE: 16 BRPM | HEART RATE: 66 BPM | OXYGEN SATURATION: 97 % | DIASTOLIC BLOOD PRESSURE: 60 MMHG | TEMPERATURE: 97.4 F | SYSTOLIC BLOOD PRESSURE: 120 MMHG

## 2024-12-06 DIAGNOSIS — I10 ESSENTIAL (PRIMARY) HYPERTENSION: ICD-10-CM

## 2024-12-06 DIAGNOSIS — K59.09 OTHER CONSTIPATION: ICD-10-CM

## 2024-12-06 DIAGNOSIS — Z87.898 PERSONAL HISTORY OF OTHER SPECIFIED CONDITIONS: ICD-10-CM

## 2024-12-06 DIAGNOSIS — D64.9 ANEMIA, UNSPECIFIED: ICD-10-CM

## 2024-12-06 PROCEDURE — 99349 HOME/RES VST EST MOD MDM 40: CPT

## 2024-12-19 ENCOUNTER — LABORATORY RESULT (OUTPATIENT)
Age: 88
End: 2024-12-19

## 2024-12-20 ENCOUNTER — LABORATORY RESULT (OUTPATIENT)
Age: 88
End: 2024-12-20

## 2024-12-27 ENCOUNTER — RX RENEWAL (OUTPATIENT)
Age: 88
End: 2024-12-27

## 2025-01-31 NOTE — DATA REVIEWED
Airway  Date/Time: 1/31/2025 8:55 AM  Urgency: elective    Difficult airway    Staffing  Performed: BRITNEY   Authorized by: Lavell Sainz MD    Performed by: BRITNEY Toussaint  Patient location during procedure: OR    Indications and Patient Condition  Indications for airway management: anesthesia and airway protection  Spontaneous ventilation: present  Sedation level: deep  Preoxygenated: yes  Patient position: pt with stick neck, maintained patients natural flexion.  Mask difficulty assessment: 0 - not attempted    Final Airway Details  Final airway type: endotracheal airway      Successful airway: ETT  Cuffed: yes   Successful intubation technique: video laryngoscopy  Facilitating devices/methods: cricoid pressure  Endotracheal tube insertion site: oral  Blade type: Glidescope S4 blade used.  Blade size: #4  ETT size (mm): 7.0  Cormack-Lehane Classification: grade IIa - partial view of glottis  Placement verified by: chest auscultation and capnometry   Measured from: gums  ETT to gums (cm): 21  Number of attempts at approach: 1  Ventilation between attempts: spontaneous  Number of other approaches attempted: 0    Additional Comments  Pt with hx of difficult intubation. Pt brought to OR, benzocaine sprayed in back of pt throat. versed, ketamine and fent given with low dose propofol gtt to remain spontaneous ventilation. Once pt deeply sedated, two hand jaw thrust used to relieve obstruction (pt tolerated jaw thrust without reacting). Once deemed deep enough with anesthesia, glidescope S4 blade introduced carefully. Slight reaction to laryngoscopy, but able to tolerate while remaining spontaneous ventilation.     Grade 2 view with glidescope, due to pt fixed neck position (tilted slightly to the right) glottic opening was visualized anterior and to the left of glidescope screen. Once view was obtained, succinylcholine given with cricoid pressure and 7.0 cara flex tip ETT passed through cords without issue.  Bilateral breath sounds confirmed.    Recommend glidescope for future anesthetics requiring ETT. Mouth opening is good and pt is endentulous. Short, thick neck and no ROM facilitates a difficult intubation.         [No studies available for review at this time.] : No studies available for review at this time.

## 2025-02-04 ENCOUNTER — TRANSCRIPTION ENCOUNTER (OUTPATIENT)
Age: 89
End: 2025-02-04

## 2025-02-18 ENCOUNTER — APPOINTMENT (OUTPATIENT)
Dept: AFTER HOURS CARE | Facility: EMERGENCY ROOM | Age: 89
End: 2025-02-18
Payer: MEDICARE

## 2025-02-18 ENCOUNTER — TRANSCRIPTION ENCOUNTER (OUTPATIENT)
Age: 89
End: 2025-02-18

## 2025-02-18 ENCOUNTER — NON-APPOINTMENT (OUTPATIENT)
Age: 89
End: 2025-02-18

## 2025-02-18 DIAGNOSIS — R10.13 EPIGASTRIC PAIN: ICD-10-CM

## 2025-02-18 DIAGNOSIS — R68.89 OTHER GENERAL SYMPTOMS AND SIGNS: ICD-10-CM

## 2025-02-18 DIAGNOSIS — R05.1 ACUTE COUGH: ICD-10-CM

## 2025-02-18 PROCEDURE — 99215 OFFICE O/P EST HI 40 MIN: CPT | Mod: 2W

## 2025-02-21 ENCOUNTER — NON-APPOINTMENT (OUTPATIENT)
Age: 89
End: 2025-02-21

## 2025-02-22 ENCOUNTER — NON-APPOINTMENT (OUTPATIENT)
Age: 89
End: 2025-02-22

## 2025-04-07 ENCOUNTER — RX RENEWAL (OUTPATIENT)
Age: 89
End: 2025-04-07

## 2025-05-13 ENCOUNTER — APPOINTMENT (OUTPATIENT)
Dept: HOME HEALTH SERVICES | Facility: HOME HEALTH | Age: 89
End: 2025-05-13

## 2025-06-26 ENCOUNTER — NON-APPOINTMENT (OUTPATIENT)
Age: 89
End: 2025-06-26

## 2025-06-27 ENCOUNTER — RX RENEWAL (OUTPATIENT)
Age: 89
End: 2025-06-27

## 2025-07-10 ENCOUNTER — APPOINTMENT (OUTPATIENT)
Dept: HOME HEALTH SERVICES | Facility: HOME HEALTH | Age: 89
End: 2025-07-10

## 2025-09-16 ENCOUNTER — APPOINTMENT (OUTPATIENT)
Dept: HOME HEALTH SERVICES | Facility: HOME HEALTH | Age: 89
End: 2025-09-16
Payer: MEDICARE

## 2025-09-16 VITALS
TEMPERATURE: 98 F | DIASTOLIC BLOOD PRESSURE: 84 MMHG | RESPIRATION RATE: 18 BRPM | OXYGEN SATURATION: 98 % | SYSTOLIC BLOOD PRESSURE: 126 MMHG | HEART RATE: 60 BPM

## 2025-09-16 DIAGNOSIS — K59.09 OTHER CONSTIPATION: ICD-10-CM

## 2025-09-16 DIAGNOSIS — E78.5 HYPERLIPIDEMIA, UNSPECIFIED: ICD-10-CM

## 2025-09-16 DIAGNOSIS — Z13.29 ENCOUNTER FOR SCREENING FOR OTHER SUSPECTED ENDOCRINE DISORDER: ICD-10-CM

## 2025-09-16 DIAGNOSIS — D64.9 ANEMIA, UNSPECIFIED: ICD-10-CM

## 2025-09-16 DIAGNOSIS — E55.9 VITAMIN D DEFICIENCY, UNSPECIFIED: ICD-10-CM

## 2025-09-16 PROCEDURE — 99349 HOME/RES VST EST MOD MDM 40: CPT

## 2025-09-16 RX ORDER — ACETAMINOPHEN 500 MG/1
500 TABLET ORAL EVERY 8 HOURS
Refills: 0 | Status: ACTIVE | COMMUNITY
Start: 2025-09-16

## 2025-09-16 RX ORDER — CETIRIZINE HYDROCHLORIDE 10 MG/1
10 TABLET, COATED ORAL
Refills: 0 | Status: ACTIVE | COMMUNITY
Start: 2025-09-16

## 2025-09-16 RX ORDER — COLD-HOT PACK
50 EACH MISCELLANEOUS DAILY
Refills: 0 | Status: ACTIVE | COMMUNITY
Start: 2025-09-16

## 2025-09-18 ENCOUNTER — LABORATORY RESULT (OUTPATIENT)
Age: 89
End: 2025-09-18

## 2025-09-18 ENCOUNTER — NON-APPOINTMENT (OUTPATIENT)
Age: 89
End: 2025-09-18

## 2025-09-19 DIAGNOSIS — E53.8 DEFICIENCY OF OTHER SPECIFIED B GROUP VITAMINS: ICD-10-CM
